# Patient Record
Sex: FEMALE | Race: WHITE | NOT HISPANIC OR LATINO | Employment: OTHER | URBAN - METROPOLITAN AREA
[De-identification: names, ages, dates, MRNs, and addresses within clinical notes are randomized per-mention and may not be internally consistent; named-entity substitution may affect disease eponyms.]

---

## 2018-03-01 ENCOUNTER — OFFICE VISIT CONVERTED (OUTPATIENT)
Dept: SURGERY | Facility: CLINIC | Age: 78
End: 2018-03-01
Attending: NURSE PRACTITIONER

## 2018-05-21 ENCOUNTER — OFFICE VISIT CONVERTED (OUTPATIENT)
Dept: UROLOGY | Facility: CLINIC | Age: 78
End: 2018-05-21
Attending: NURSE PRACTITIONER

## 2018-08-20 ENCOUNTER — PROCEDURE VISIT CONVERTED (OUTPATIENT)
Dept: UROLOGY | Facility: CLINIC | Age: 78
End: 2018-08-20
Attending: UROLOGY

## 2018-08-23 ENCOUNTER — PROCEDURE VISIT CONVERTED (OUTPATIENT)
Dept: UROLOGY | Facility: CLINIC | Age: 78
End: 2018-08-23
Attending: UROLOGY

## 2018-11-08 ENCOUNTER — OFFICE VISIT CONVERTED (OUTPATIENT)
Dept: UROLOGY | Facility: CLINIC | Age: 78
End: 2018-11-08
Attending: UROLOGY

## 2019-01-11 ENCOUNTER — HOSPITAL ENCOUNTER (OUTPATIENT)
Dept: OTHER | Facility: HOSPITAL | Age: 79
Discharge: HOME | End: 2019-01-11
Attending: FAMILY MEDICINE

## 2019-01-11 LAB
25(OH)D3 SERPL-MCNC: 25.3 NG/ML (ref 30–100)
ALBUMIN SERPL-MCNC: 4.2 G/DL (ref 3.5–5)
ALBUMIN/GLOB SERPL: 1.8 {RATIO} (ref 1.4–2.6)
ALP SERPL-CCNC: 71 U/L (ref 43–160)
ALT SERPL-CCNC: 21 U/L (ref 10–40)
ANION GAP SERPL CALC-SCNC: 18 MMOL/L (ref 8–19)
AST SERPL-CCNC: 21 U/L (ref 15–50)
BILIRUB SERPL-MCNC: 0.7 MG/DL (ref 0.2–1.3)
BUN SERPL-MCNC: 11 MG/DL (ref 5–25)
BUN/CREAT SERPL: 13 {RATIO} (ref 6–20)
CALCIUM SERPL-MCNC: 9.3 MG/DL (ref 8.7–10.4)
CHLORIDE SERPL-SCNC: 106 MMOL/L (ref 99–111)
CONV CO2: 25 MMOL/L (ref 22–32)
CONV TOTAL PROTEIN: 6.6 G/DL (ref 6.3–8.2)
CREAT UR-MCNC: 0.84 MG/DL (ref 0.5–0.9)
GFR SERPLBLD BASED ON 1.73 SQ M-ARVRAT: >60 ML/MIN/{1.73_M2}
GLOBULIN UR ELPH-MCNC: 2.4 G/DL (ref 2–3.5)
GLUCOSE SERPL-MCNC: 117 MG/DL (ref 65–99)
OSMOLALITY SERPL CALC.SUM OF ELEC: 300 MOSM/KG (ref 273–304)
POTASSIUM SERPL-SCNC: 4.1 MMOL/L (ref 3.5–5.3)
SODIUM SERPL-SCNC: 145 MMOL/L (ref 135–147)

## 2019-03-26 ENCOUNTER — OFFICE VISIT CONVERTED (OUTPATIENT)
Dept: PULMONOLOGY | Facility: CLINIC | Age: 79
End: 2019-03-26
Attending: INTERNAL MEDICINE

## 2019-04-29 ENCOUNTER — HOSPITAL ENCOUNTER (OUTPATIENT)
Dept: OTHER | Facility: HOSPITAL | Age: 79
Discharge: HOME | End: 2019-04-29
Attending: FAMILY MEDICINE

## 2019-04-29 LAB
25(OH)D3 SERPL-MCNC: 22.7 NG/ML (ref 30–100)
ALBUMIN SERPL-MCNC: 4.3 G/DL (ref 3.5–5)
ALBUMIN/GLOB SERPL: 2 {RATIO} (ref 1.4–2.6)
ALP SERPL-CCNC: 68 U/L (ref 43–160)
ALT SERPL-CCNC: 17 U/L (ref 10–40)
ANION GAP SERPL CALC-SCNC: 15 MMOL/L (ref 8–19)
AST SERPL-CCNC: 19 U/L (ref 15–50)
BILIRUB SERPL-MCNC: 0.77 MG/DL (ref 0.2–1.3)
BUN SERPL-MCNC: 13 MG/DL (ref 5–25)
BUN/CREAT SERPL: 22 {RATIO} (ref 6–20)
CALCIUM SERPL-MCNC: 8.9 MG/DL (ref 8.7–10.4)
CHLORIDE SERPL-SCNC: 106 MMOL/L (ref 99–111)
CONV CO2: 27 MMOL/L (ref 22–32)
CONV TOTAL PROTEIN: 6.5 G/DL (ref 6.3–8.2)
CREAT UR-MCNC: 0.59 MG/DL (ref 0.5–0.9)
EST. AVERAGE GLUCOSE BLD GHB EST-MCNC: 117 MG/DL
GFR SERPLBLD BASED ON 1.73 SQ M-ARVRAT: >60 ML/MIN/{1.73_M2}
GLOBULIN UR ELPH-MCNC: 2.2 G/DL (ref 2–3.5)
GLUCOSE SERPL-MCNC: 105 MG/DL (ref 65–99)
HBA1C MFR BLD: 5.7 % (ref 3.5–5.7)
OSMOLALITY SERPL CALC.SUM OF ELEC: 298 MOSM/KG (ref 273–304)
POTASSIUM SERPL-SCNC: 3.7 MMOL/L (ref 3.5–5.3)
SODIUM SERPL-SCNC: 144 MMOL/L (ref 135–147)

## 2019-06-20 ENCOUNTER — OFFICE VISIT CONVERTED (OUTPATIENT)
Dept: UROLOGY | Facility: CLINIC | Age: 79
End: 2019-06-20
Attending: UROLOGY

## 2019-08-14 ENCOUNTER — HOSPITAL ENCOUNTER (OUTPATIENT)
Dept: OTHER | Facility: HOSPITAL | Age: 79
Discharge: HOME | End: 2019-08-14
Attending: FAMILY MEDICINE

## 2019-08-14 LAB
25(OH)D3 SERPL-MCNC: 23.4 NG/ML (ref 30–100)
ALBUMIN SERPL-MCNC: 4.2 G/DL (ref 3.5–5)
ALBUMIN/GLOB SERPL: 1.9 {RATIO} (ref 1.4–2.6)
ALP SERPL-CCNC: 76 U/L (ref 43–160)
ALT SERPL-CCNC: 16 U/L (ref 10–40)
ANION GAP SERPL CALC-SCNC: 18 MMOL/L (ref 8–19)
AST SERPL-CCNC: 18 U/L (ref 15–50)
BILIRUB SERPL-MCNC: 0.52 MG/DL (ref 0.2–1.3)
BUN SERPL-MCNC: 15 MG/DL (ref 5–25)
BUN/CREAT SERPL: 25 {RATIO} (ref 6–20)
CALCIUM SERPL-MCNC: 9 MG/DL (ref 8.7–10.4)
CHLORIDE SERPL-SCNC: 104 MMOL/L (ref 99–111)
CONV CO2: 23 MMOL/L (ref 22–32)
CONV TOTAL PROTEIN: 6.4 G/DL (ref 6.3–8.2)
CREAT UR-MCNC: 0.6 MG/DL (ref 0.5–0.9)
GFR SERPLBLD BASED ON 1.73 SQ M-ARVRAT: >60 ML/MIN/{1.73_M2}
GLOBULIN UR ELPH-MCNC: 2.2 G/DL (ref 2–3.5)
GLUCOSE SERPL-MCNC: 104 MG/DL (ref 65–99)
OSMOLALITY SERPL CALC.SUM OF ELEC: 293 MOSM/KG (ref 273–304)
POTASSIUM SERPL-SCNC: 4.1 MMOL/L (ref 3.5–5.3)
SODIUM SERPL-SCNC: 141 MMOL/L (ref 135–147)

## 2019-08-15 LAB
T4 FREE SERPL-MCNC: 1.4 NG/DL (ref 0.9–1.8)
TSH SERPL-ACNC: 4.41 M[IU]/L (ref 0.27–4.2)

## 2019-11-13 ENCOUNTER — HOSPITAL ENCOUNTER (OUTPATIENT)
Dept: OTHER | Facility: HOSPITAL | Age: 79
Discharge: HOME | End: 2019-11-13
Attending: FAMILY MEDICINE

## 2019-11-13 LAB
25(OH)D3 SERPL-MCNC: 24.6 NG/ML (ref 30–100)
ALBUMIN SERPL-MCNC: 4 G/DL (ref 3.5–5)
ALBUMIN/GLOB SERPL: 1.4 {RATIO} (ref 1.4–2.6)
ALP SERPL-CCNC: 80 U/L (ref 43–160)
ALT SERPL-CCNC: 16 U/L (ref 10–40)
ANION GAP SERPL CALC-SCNC: 17 MMOL/L (ref 8–19)
AST SERPL-CCNC: 19 U/L (ref 15–50)
BILIRUB SERPL-MCNC: 0.66 MG/DL (ref 0.2–1.3)
BUN SERPL-MCNC: 11 MG/DL (ref 5–25)
BUN/CREAT SERPL: 17 {RATIO} (ref 6–20)
CALCIUM SERPL-MCNC: 9.3 MG/DL (ref 8.7–10.4)
CHLORIDE SERPL-SCNC: 103 MMOL/L (ref 99–111)
CONV CO2: 26 MMOL/L (ref 22–32)
CONV TOTAL PROTEIN: 6.9 G/DL (ref 6.3–8.2)
CREAT UR-MCNC: 0.63 MG/DL (ref 0.5–0.9)
GFR SERPLBLD BASED ON 1.73 SQ M-ARVRAT: >60 ML/MIN/{1.73_M2}
GLOBULIN UR ELPH-MCNC: 2.9 G/DL (ref 2–3.5)
GLUCOSE SERPL-MCNC: 112 MG/DL (ref 65–99)
OSMOLALITY SERPL CALC.SUM OF ELEC: 294 MOSM/KG (ref 273–304)
POTASSIUM SERPL-SCNC: 3.9 MMOL/L (ref 3.5–5.3)
SODIUM SERPL-SCNC: 142 MMOL/L (ref 135–147)
T4 FREE SERPL-MCNC: 1.2 NG/DL (ref 0.9–1.8)
TSH SERPL-ACNC: 6.79 M[IU]/L (ref 0.27–4.2)

## 2019-11-15 ENCOUNTER — HOSPITAL ENCOUNTER (OUTPATIENT)
Dept: MAMMOGRAPHY | Facility: HOSPITAL | Age: 79
Discharge: HOME | End: 2019-11-15
Attending: FAMILY MEDICINE

## 2019-12-04 ENCOUNTER — HOSPITAL ENCOUNTER (OUTPATIENT)
Dept: SURGERY | Facility: HOSPITAL | Age: 79
Setting detail: HOSPITAL OUTPATIENT SURGERY
Discharge: HOME | End: 2019-12-04
Attending: OPHTHALMOLOGY

## 2019-12-04 LAB — GLUCOSE BLD-MCNC: 99 MG/DL (ref 65–99)

## 2019-12-18 ENCOUNTER — HOSPITAL ENCOUNTER (OUTPATIENT)
Dept: SURGERY | Facility: HOSPITAL | Age: 79
Setting detail: HOSPITAL OUTPATIENT SURGERY
Discharge: HOME | End: 2019-12-18
Attending: OPHTHALMOLOGY

## 2019-12-18 LAB — GLUCOSE BLD-MCNC: 106 MG/DL (ref 65–99)

## 2020-01-23 ENCOUNTER — HOSPITAL ENCOUNTER (OUTPATIENT)
Dept: UROLOGY | Facility: CLINIC | Age: 80
Discharge: HOME | End: 2020-01-23
Attending: UROLOGY

## 2020-01-23 ENCOUNTER — OFFICE VISIT CONVERTED (OUTPATIENT)
Dept: UROLOGY | Facility: CLINIC | Age: 80
End: 2020-01-23
Attending: UROLOGY

## 2020-01-25 LAB — BACTERIA UR CULT: NORMAL

## 2020-02-04 ENCOUNTER — HOSPITAL ENCOUNTER (OUTPATIENT)
Dept: OTHER | Facility: HOSPITAL | Age: 80
Discharge: HOME | End: 2020-02-04
Attending: FAMILY MEDICINE

## 2020-02-04 LAB
25(OH)D3 SERPL-MCNC: 29.4 NG/ML (ref 30–100)
EST. AVERAGE GLUCOSE BLD GHB EST-MCNC: 120 MG/DL
HBA1C MFR BLD: 5.8 % (ref 3.5–5.7)
T4 FREE SERPL-MCNC: 1.6 NG/DL (ref 0.9–1.8)
TSH SERPL-ACNC: 1.84 M[IU]/L (ref 0.27–4.2)

## 2020-04-10 ENCOUNTER — OFFICE VISIT CONVERTED (OUTPATIENT)
Dept: PULMONOLOGY | Facility: CLINIC | Age: 80
End: 2020-04-10
Attending: NURSE PRACTITIONER

## 2020-05-06 ENCOUNTER — HOSPITAL ENCOUNTER (OUTPATIENT)
Dept: OTHER | Facility: HOSPITAL | Age: 80
Discharge: HOME | End: 2020-05-06
Attending: FAMILY MEDICINE

## 2020-05-06 LAB
25(OH)D3 SERPL-MCNC: 25.1 NG/ML (ref 30–100)
ALBUMIN SERPL-MCNC: 4.1 G/DL (ref 3.5–5)
ALBUMIN/GLOB SERPL: 1.7 {RATIO} (ref 1.4–2.6)
ALP SERPL-CCNC: 60 U/L (ref 43–160)
ALT SERPL-CCNC: 16 U/L (ref 10–40)
ANION GAP SERPL CALC-SCNC: 18 MMOL/L (ref 8–19)
AST SERPL-CCNC: 20 U/L (ref 15–50)
BILIRUB SERPL-MCNC: 0.84 MG/DL (ref 0.2–1.3)
BUN SERPL-MCNC: 15 MG/DL (ref 5–25)
BUN/CREAT SERPL: 26 {RATIO} (ref 6–20)
CALCIUM SERPL-MCNC: 9.1 MG/DL (ref 8.7–10.4)
CHLORIDE SERPL-SCNC: 104 MMOL/L (ref 99–111)
CONV CO2: 26 MMOL/L (ref 22–32)
CONV TOTAL PROTEIN: 6.5 G/DL (ref 6.3–8.2)
CREAT UR-MCNC: 0.57 MG/DL (ref 0.5–0.9)
GFR SERPLBLD BASED ON 1.73 SQ M-ARVRAT: >60 ML/MIN/{1.73_M2}
GLOBULIN UR ELPH-MCNC: 2.4 G/DL (ref 2–3.5)
GLUCOSE SERPL-MCNC: 105 MG/DL (ref 65–99)
OSMOLALITY SERPL CALC.SUM OF ELEC: 297 MOSM/KG (ref 273–304)
POTASSIUM SERPL-SCNC: 4.5 MMOL/L (ref 3.5–5.3)
SODIUM SERPL-SCNC: 143 MMOL/L (ref 135–147)

## 2020-08-05 ENCOUNTER — HOSPITAL ENCOUNTER (OUTPATIENT)
Dept: OTHER | Facility: HOSPITAL | Age: 80
Discharge: HOME | End: 2020-08-05
Attending: FAMILY MEDICINE

## 2020-08-05 LAB — 25(OH)D3 SERPL-MCNC: 24.6 NG/ML (ref 30–100)

## 2020-10-07 ENCOUNTER — OFFICE VISIT CONVERTED (OUTPATIENT)
Dept: UROLOGY | Facility: CLINIC | Age: 80
End: 2020-10-07
Attending: UROLOGY

## 2020-11-10 ENCOUNTER — HOSPITAL ENCOUNTER (OUTPATIENT)
Dept: OTHER | Facility: HOSPITAL | Age: 80
Discharge: HOME | End: 2020-11-10
Attending: FAMILY MEDICINE

## 2020-11-10 LAB
25(OH)D3 SERPL-MCNC: 25.1 NG/ML (ref 30–100)
ALBUMIN SERPL-MCNC: 4 G/DL (ref 3.5–5)
ALBUMIN/GLOB SERPL: 1.7 {RATIO} (ref 1.4–2.6)
ALP SERPL-CCNC: 63 U/L (ref 43–160)
ALT SERPL-CCNC: 19 U/L (ref 10–40)
ANION GAP SERPL CALC-SCNC: 13 MMOL/L (ref 8–19)
AST SERPL-CCNC: 19 U/L (ref 15–50)
BILIRUB SERPL-MCNC: 0.95 MG/DL (ref 0.2–1.3)
BUN SERPL-MCNC: 13 MG/DL (ref 5–25)
BUN/CREAT SERPL: 20 {RATIO} (ref 6–20)
CALCIUM SERPL-MCNC: 9.6 MG/DL (ref 8.7–10.4)
CHLORIDE SERPL-SCNC: 106 MMOL/L (ref 99–111)
CONV CO2: 27 MMOL/L (ref 22–32)
CONV TOTAL PROTEIN: 6.4 G/DL (ref 6.3–8.2)
CREAT UR-MCNC: 0.65 MG/DL (ref 0.5–0.9)
GFR SERPLBLD BASED ON 1.73 SQ M-ARVRAT: >60 ML/MIN/{1.73_M2}
GLOBULIN UR ELPH-MCNC: 2.4 G/DL (ref 2–3.5)
GLUCOSE SERPL-MCNC: 112 MG/DL (ref 65–99)
OSMOLALITY SERPL CALC.SUM OF ELEC: 295 MOSM/KG (ref 273–304)
POTASSIUM SERPL-SCNC: 4.2 MMOL/L (ref 3.5–5.3)
SODIUM SERPL-SCNC: 142 MMOL/L (ref 135–147)

## 2021-01-13 ENCOUNTER — HOSPITAL ENCOUNTER (OUTPATIENT)
Dept: OTHER | Facility: HOSPITAL | Age: 81
Discharge: HOME | End: 2021-01-13
Attending: INTERNAL MEDICINE

## 2021-01-27 ENCOUNTER — OFFICE VISIT CONVERTED (OUTPATIENT)
Dept: PULMONOLOGY | Facility: CLINIC | Age: 81
End: 2021-01-27
Attending: NURSE PRACTITIONER

## 2021-01-28 ENCOUNTER — HOSPITAL ENCOUNTER (OUTPATIENT)
Dept: GENERAL RADIOLOGY | Facility: HOSPITAL | Age: 81
Discharge: HOME | End: 2021-01-28
Attending: NURSE PRACTITIONER

## 2021-02-09 ENCOUNTER — HOSPITAL ENCOUNTER (OUTPATIENT)
Dept: VACCINE CLINIC | Facility: HOSPITAL | Age: 81
Discharge: HOME | End: 2021-02-09
Attending: INTERNAL MEDICINE

## 2021-02-24 ENCOUNTER — HOSPITAL ENCOUNTER (OUTPATIENT)
Dept: OTHER | Facility: HOSPITAL | Age: 81
Discharge: HOME | End: 2021-02-24
Attending: NURSE PRACTITIONER

## 2021-02-24 LAB
25(OH)D3 SERPL-MCNC: 25.8 NG/ML (ref 30–100)
ALBUMIN SERPL-MCNC: 4.1 G/DL (ref 3.5–5)
ALBUMIN/GLOB SERPL: 1.8 {RATIO} (ref 1.4–2.6)
ALP SERPL-CCNC: 71 U/L (ref 43–160)
ALT SERPL-CCNC: 20 U/L (ref 10–40)
ANION GAP SERPL CALC-SCNC: 12 MMOL/L (ref 8–19)
AST SERPL-CCNC: 21 U/L (ref 15–50)
BASOPHILS # BLD AUTO: 0.02 10*3/UL (ref 0–0.2)
BASOPHILS NFR BLD AUTO: 0.4 % (ref 0–3)
BILIRUB SERPL-MCNC: 0.65 MG/DL (ref 0.2–1.3)
BUN SERPL-MCNC: 13 MG/DL (ref 5–25)
BUN/CREAT SERPL: 21 {RATIO} (ref 6–20)
CALCIUM SERPL-MCNC: 9.1 MG/DL (ref 8.7–10.4)
CHLORIDE SERPL-SCNC: 103 MMOL/L (ref 99–111)
CHOLEST SERPL-MCNC: 134 MG/DL (ref 107–200)
CHOLEST/HDLC SERPL: 2.9 {RATIO} (ref 3–6)
CONV ABS IMM GRAN: 0.02 10*3/UL (ref 0–0.2)
CONV CO2: 27 MMOL/L (ref 22–32)
CONV IMMATURE GRAN: 0.4 % (ref 0–1.8)
CONV TOTAL PROTEIN: 6.4 G/DL (ref 6.3–8.2)
CREAT UR-MCNC: 0.62 MG/DL (ref 0.5–0.9)
DEPRECATED RDW RBC AUTO: 44.4 FL (ref 36.4–46.3)
EOSINOPHIL # BLD AUTO: 0.15 10*3/UL (ref 0–0.7)
EOSINOPHIL # BLD AUTO: 3 % (ref 0–7)
ERYTHROCYTE [DISTWIDTH] IN BLOOD BY AUTOMATED COUNT: 13.3 % (ref 11.7–14.4)
EST. AVERAGE GLUCOSE BLD GHB EST-MCNC: 123 MG/DL
GFR SERPLBLD BASED ON 1.73 SQ M-ARVRAT: >60 ML/MIN/{1.73_M2}
GLOBULIN UR ELPH-MCNC: 2.3 G/DL (ref 2–3.5)
GLUCOSE SERPL-MCNC: 111 MG/DL (ref 65–99)
HBA1C MFR BLD: 5.9 % (ref 3.5–5.7)
HCT VFR BLD AUTO: 42.1 % (ref 37–47)
HDLC SERPL-MCNC: 47 MG/DL (ref 40–60)
HGB BLD-MCNC: 14.1 G/DL (ref 12–16)
LDLC SERPL CALC-MCNC: 73 MG/DL (ref 70–100)
LYMPHOCYTES # BLD AUTO: 1.5 10*3/UL (ref 1–5)
LYMPHOCYTES NFR BLD AUTO: 30.2 % (ref 20–45)
MCH RBC QN AUTO: 30.5 PG (ref 27–31)
MCHC RBC AUTO-ENTMCNC: 33.5 G/DL (ref 33–37)
MCV RBC AUTO: 91.1 FL (ref 81–99)
MONOCYTES # BLD AUTO: 0.44 10*3/UL (ref 0.2–1.2)
MONOCYTES NFR BLD AUTO: 8.9 % (ref 3–10)
NEUTROPHILS # BLD AUTO: 2.83 10*3/UL (ref 2–8)
NEUTROPHILS NFR BLD AUTO: 57.1 % (ref 30–85)
NRBC CBCN: 0 % (ref 0–0.7)
OSMOLALITY SERPL CALC.SUM OF ELEC: 287 MOSM/KG (ref 273–304)
PLATELET # BLD AUTO: 219 10*3/UL (ref 130–400)
PMV BLD AUTO: 10.3 FL (ref 9.4–12.3)
POTASSIUM SERPL-SCNC: 3.8 MMOL/L (ref 3.5–5.3)
RBC # BLD AUTO: 4.62 10*6/UL (ref 4.2–5.4)
SODIUM SERPL-SCNC: 138 MMOL/L (ref 135–147)
T4 FREE SERPL-MCNC: 1.7 NG/DL (ref 0.9–1.8)
TRIGL SERPL-MCNC: 69 MG/DL (ref 40–150)
TSH SERPL-ACNC: 3.4 M[IU]/L (ref 0.27–4.2)
VLDLC SERPL-MCNC: 14 MG/DL (ref 5–37)
WBC # BLD AUTO: 4.96 10*3/UL (ref 4.8–10.8)

## 2021-02-26 LAB — IGE SERPL-ACNC: 5 K[IU]/ML (ref 0–24)

## 2021-03-30 ENCOUNTER — OFFICE VISIT CONVERTED (OUTPATIENT)
Dept: PULMONOLOGY | Facility: CLINIC | Age: 81
End: 2021-03-30
Attending: INTERNAL MEDICINE

## 2021-05-11 ENCOUNTER — HOSPITAL ENCOUNTER (OUTPATIENT)
Dept: OTHER | Facility: HOSPITAL | Age: 81
Discharge: HOME | End: 2021-05-11
Attending: FAMILY MEDICINE

## 2021-05-11 LAB
25(OH)D3 SERPL-MCNC: 24.5 NG/ML (ref 30–100)
ALBUMIN SERPL-MCNC: 4.1 G/DL (ref 3.5–5)
ALBUMIN/GLOB SERPL: 1.5 {RATIO} (ref 1.4–2.6)
ALP SERPL-CCNC: 64 U/L (ref 43–160)
ALT SERPL-CCNC: 17 U/L (ref 10–40)
ANION GAP SERPL CALC-SCNC: 14 MMOL/L (ref 8–19)
AST SERPL-CCNC: 21 U/L (ref 15–50)
BILIRUB SERPL-MCNC: 0.97 MG/DL (ref 0.2–1.3)
BUN SERPL-MCNC: 11 MG/DL (ref 5–25)
BUN/CREAT SERPL: 17 {RATIO} (ref 6–20)
CALCIUM SERPL-MCNC: 9 MG/DL (ref 8.7–10.4)
CHLORIDE SERPL-SCNC: 105 MMOL/L (ref 99–111)
CONV CO2: 27 MMOL/L (ref 22–32)
CONV TOTAL PROTEIN: 6.8 G/DL (ref 6.3–8.2)
CREAT UR-MCNC: 0.64 MG/DL (ref 0.5–0.9)
GFR SERPLBLD BASED ON 1.73 SQ M-ARVRAT: >60 ML/MIN/{1.73_M2}
GLOBULIN UR ELPH-MCNC: 2.7 G/DL (ref 2–3.5)
GLUCOSE SERPL-MCNC: 102 MG/DL (ref 65–99)
OSMOLALITY SERPL CALC.SUM OF ELEC: 294 MOSM/KG (ref 273–304)
POTASSIUM SERPL-SCNC: 3.8 MMOL/L (ref 3.5–5.3)
SODIUM SERPL-SCNC: 142 MMOL/L (ref 135–147)

## 2021-05-13 NOTE — PROGRESS NOTES
Progress Note      Patient Name: Cinthia Díaz   Patient ID: [id number]   Sex: Female   YOB: 1940    Primary Care Provider: Yasmin Heck MD   Referring Provider: Sarah JUÁREZ    Visit Date: October 7, 2020    Provider: Sidney Rocha MD   Location: [facility]   Location Address: [address] [place] [address], [address] [place] [address]  [id number]   Location Phone: [phone]          Chief Complaint  · follow up      History Of Present Illness  The patient is a 80 year old /White female , who is a follow up on urge incontinence.        1/23/20 urine culture.Patient has no urgency in the day time.Nocturia 2-3 times which affects her sleep.No dysuria.No gross hematuria.Discussed diuretics with the patient but she does'nt like the idea       Past Medical History  Allergic rhinitis; Allergic rhinitis, chronic; Arthritis; Bladder Disorder; Chondromalacia of patella; Chronic Obstructive Pulmonary Disease; Colon polyps; Cystourethrocele; Degenertive Joint Disease; NOC; Diabetes; High blood pressure; Hyperlipidemia; Hypertension; Intrinsic sphincter deficiency; Knee pain; Limb Pain; Limb Swelling; Lung Nodule; Osteopenia; Transient ischemic attack (TIA); Urgency incontinence         Past Surgical History  Arthroscopic Knee Surgery; Bladder Repair; Cataract exctraction with lens implant; Cholecystectomy; Colonoscopy; Gallbladder; Hernia; Hernia Repair; Hysterectomy-Abdominal         Medication List  aspirin 81 mg oral tablet,delayed release (DR/EC); atorvastatin 20 mg oral tablet; azelastine 137 mcg (0.1 %) nasal aerosol,spray; Ditropan XL 5 mg oral tablet extended release 24hr; doxazosin 2 mg oral tablet; Janumet  mg oral tablet; levothyroxine 75 mcg oral tablet; metformin 500 mg oral tablet; metoprolol succinate 25 mg oral tablet extended release 24 hr; Singulair 10 mg oral tablet; Vit D 50 ,000         Allergy List  ACE INHIBITORS; Codiene; Micardis; Molds; morphine; Pollens  "(trees, grasses, flowers)       Allergies Reconciled  Family Medical History  Emphysema; Stroke; Diabetes, unspecified type; Family history of colon cancer         Social History  Alcohol (Light); Caffeine (Current some day); Second hand smoke exposure (Never); Tobacco (Never)         Review of Systems  · Constitutional  o Denies  o : fever, headache, chills  · Eyes  o Denies  o : eye pain, double vision, blurred vision  · HENT  o Denies  o : sinus problems, sore throat, ear infection  · Cardiovascular  o Denies  o : chest pain, high blood pressure, varicosities  · Respiratory  o Denies  o : shortness of breath, wheezing, frequent cough  · Gastrointestinal  o Admits  o : excessive flatulence  o Denies  o : nausea, vomiting, heartburn, indigestion, abdominal pain  · Genitourinary  o Admits  o : frequency, nocturia, incontinence  o Denies  o : urgency, urinary retention, painful urination  · Integument  o Denies  o : rash, itching, boils  · Neurologic  o Denies  o : tingling or numbness, tremors, dizzy spells  · Musculoskeletal  o Denies  o : joint pain, neck pain, back pain  · Endocrine  o Denies  o : cold intolerance, heat intolerance, tired, excessive thirst, sluggish  · Psychiatric  o Admits  o : feels satisfied with life  o Denies  o : severe depression, concerns with hurting themselves  · Heme-Lymph  o Denies  o : swollen glands, blood clotting problems  · Allergic-Immunologic  o Denies  o : sinus allergy symptoms, hay fever      Vitals  Date Time BP Position Site L\R Cuff Size HR RR TEMP (F) WT  HT  BMI kg/m2 BSA m2 O2 Sat FR L/min FiO2        10/07/2020 01:24 /48 Sitting    87 - R  97.9 170lbs 0oz 5'  3\" 30.11 1.85             Physical Examination  · Constitutional  o Appearance  o : Well nourished, well developed patient in no acute distress. Ambulating without difficulty.  · Neck  o Thyroid  o : gland size normal, nontender, no nodules or masses present on palpation  · Respiratory  o Respiratory " Effort  o : Breathing is unlabored without accessory muscle use  o Inspection of Chest  o : normal appearance, no retractions  o Auscultation of Lungs  o : normal breath sounds throughout  · Cardiovascular  o Heart  o :   § Auscultation of Heart  § : regular rate and rhythm, no murmurs, gallops or rubs  o Peripheral Vascular System  o : No abnormalities  · Gastrointestinal  o Abdominal Examination  o : Scaphoid abdomen which is non-tender to palpation with normal tone and without rigidity or guarding. Normal bowel sounds. No masses present.  o Liver and spleen  o : No hepatomegaly present. Liver is non-tender to palpation and spleen is not palpable.  o Hernias  o : no hernias present  · Skin and Subcutaneous Tissue  o General Inspection  o : No rashes, lesions or areas of discoloration present. Skin turgor is normal.  · Neurologic  o Mental Status Examination  o : grossly oriented to person, place and time  o Gait and Station  o : normal gait, able to stand without difficulty  · Psychiatric  o Mood and Affect  o : mood normal, affect appropriate     No pitting edema of lower extremities       Figure 1.0: Pain Rating Scale-[place]         Results  · In-Office Procedures  o Lab procedure  § Automated dipstick urinalysis with microscopy (90091)   § Color Ur: Yellow   § Clarity Ur: Clear   § Glucose Ur Ql Strip: Negative   § Bilirub Ur Ql Strip: Negative   § Ketones Ur Ql Strip: Negative   § Sp Gr Ur Qn: 1.010   § Hgb Ur Ql Strip: Trace-Intact   § pH Ur-LsCnc: 5.5   § Prot Ur Ql Strip: Negative   § Urobilinogen Ur Strip-mCnc: 0.2 E.U./dL   § Nitrite Ur Ql Strip: Negative   § WBC Est Ur Ql Strip: Negative   § RBC UrnS Qn HPF: 0   § WBC UrnS Qn HPF: 0   § Bacteria UrnS Qn HPF: 0   § Crystals UrnS Qn HPF: 0   § Epithelial Cells (non renal): 0 /HPF  § Epithelial Cells (renal): 0       Assessment  · Urgency incontinence     788.31/N39.41  improved with medication  · Diabetes     250.92    Problems  Reconciled  Plan  · Medications  o Medications have been Reconciled  o Transition of Care or Provider Policy  · Instructions  o Will continue DitropanXL 5 mgm byron and see in 6 mnths            Electronically Signed by: Sidney Rocha MD -Author on October 7, 2020 02:03:59 PM

## 2021-05-14 VITALS
HEIGHT: 63 IN | TEMPERATURE: 97.9 F | HEART RATE: 87 BPM | WEIGHT: 170 LBS | BODY MASS INDEX: 30.12 KG/M2 | DIASTOLIC BLOOD PRESSURE: 48 MMHG | SYSTOLIC BLOOD PRESSURE: 127 MMHG

## 2021-05-15 VITALS
HEART RATE: 81 BPM | WEIGHT: 170 LBS | DIASTOLIC BLOOD PRESSURE: 73 MMHG | BODY MASS INDEX: 30.12 KG/M2 | HEIGHT: 63 IN | SYSTOLIC BLOOD PRESSURE: 143 MMHG | TEMPERATURE: 98.5 F

## 2021-05-15 VITALS
HEIGHT: 63 IN | DIASTOLIC BLOOD PRESSURE: 70 MMHG | HEART RATE: 93 BPM | WEIGHT: 170 LBS | BODY MASS INDEX: 30.12 KG/M2 | SYSTOLIC BLOOD PRESSURE: 122 MMHG | TEMPERATURE: 98.7 F

## 2021-05-16 VITALS
HEIGHT: 63 IN | DIASTOLIC BLOOD PRESSURE: 87 MMHG | SYSTOLIC BLOOD PRESSURE: 144 MMHG | WEIGHT: 172 LBS | HEART RATE: 107 BPM | TEMPERATURE: 98.1 F | BODY MASS INDEX: 30.48 KG/M2

## 2021-05-16 VITALS — HEIGHT: 63 IN | BODY MASS INDEX: 30.12 KG/M2 | WEIGHT: 170 LBS | RESPIRATION RATE: 16 BRPM

## 2021-05-16 VITALS
DIASTOLIC BLOOD PRESSURE: 81 MMHG | HEART RATE: 83 BPM | BODY MASS INDEX: 30.12 KG/M2 | HEIGHT: 63 IN | SYSTOLIC BLOOD PRESSURE: 138 MMHG | WEIGHT: 170 LBS | TEMPERATURE: 98.4 F

## 2021-05-28 VITALS
TEMPERATURE: 98.3 F | SYSTOLIC BLOOD PRESSURE: 119 MMHG | WEIGHT: 168 LBS | RESPIRATION RATE: 16 BRPM | BODY MASS INDEX: 28.68 KG/M2 | DIASTOLIC BLOOD PRESSURE: 61 MMHG | OXYGEN SATURATION: 96 % | HEIGHT: 64 IN | HEART RATE: 91 BPM

## 2021-05-28 VITALS
WEIGHT: 168 LBS | BODY MASS INDEX: 28.68 KG/M2 | OXYGEN SATURATION: 96 % | SYSTOLIC BLOOD PRESSURE: 140 MMHG | RESPIRATION RATE: 16 BRPM | HEART RATE: 113 BPM | TEMPERATURE: 98.4 F | HEIGHT: 64 IN | DIASTOLIC BLOOD PRESSURE: 68 MMHG

## 2021-05-28 VITALS
HEART RATE: 86 BPM | HEIGHT: 64 IN | OXYGEN SATURATION: 96 % | RESPIRATION RATE: 15 BRPM | DIASTOLIC BLOOD PRESSURE: 54 MMHG | WEIGHT: 174 LBS | BODY MASS INDEX: 29.71 KG/M2 | SYSTOLIC BLOOD PRESSURE: 130 MMHG | TEMPERATURE: 96.5 F

## 2021-05-28 NOTE — PROGRESS NOTES
Patient: ERWIN VALADEZ     Acct: [id number]     Report: #[id number]  UNIT #: [id number]     : 1940    Encounter Date:2021  PRIMARY CARE: JESS COBB  ***Signed***  --------------------------------------------------------------------------------------------------------------------  Chief Complaint      Encounter Date      Mar 30, 2021            Primary Care Provider      JESS COBB            Referring Provider      JESS COBB            Patient Complaint      Patient is complaining of      Patient is here today for a 2 month follow up            VITALS      Height 5 ft 4.00 in / 162.56 cm      Weight 168 lbs  / 76.434231 kg      BSA 1.82 m2      BMI 28.8 kg/m2      Temperature 98.3 F / 36.83 C - Tympanic      Pulse 91      Respirations 16      Blood Pressure 119/61 Sitting, Left Arm      Pulse Oximetry 96%, room air      Initial Exhaled Nitrous Oxide      Date:  Mar 26, 2019            HPI      The patient is a 80 year old female with recurrent bronchitis and pneumonia, 5     mm left lower lobe lung nodule, mediastinal lymphadenopathy, chronic allergies,     hiatal hernia and posterior pharyngeal wall erythema. She is here for follow up     today.             Since her last office visit she had a dry cough for several weeks and had a     chest x-ray done which was normal. She continues to have dry cough with no     diurnal variation. She does not produce any phlegm and does not have any     worsening cough when she lies flat. She did take Prevacid and it helped a little    but but otherwise she has been the same. She has no fever or chills, no nausea     or vomiting. She tried Mucinex D M but it made her dizzy. She has some small     swelling in her left neck which has been there for the past 2 months or so. She     has no changes in weight or appetite, no nausea or vomiting.            ROS      Constitutional:  Denies: Fatigue, Fever, Weight gain, Weight loss, Chills,      Insomnia, Other      Respiratory/Breathing:  Complains of: Cough (dry cough); Denies: Shortness of     air, Wheezing, Hemoptysis, Pleuritic pain, Other      Endocrine:  Denies: Polydipsia, Polyuria, Heat/cold intolerance, Abnorml     menstrual pattern, Diabetes, Other      Eyes:  Denies: Blurred vision, Vision Changes, Other      Ears, nose, mouth, throat:  Denies: Mouth lesions, Thrush, Throat pain,     Hoarseness, Allergies/Hay Fever, Post Nasal Drip, Headaches, Recent Head Injury,    Nose Bleeding, Neck Stiffness, Thyroid Mass, Hearing Loss, Ear Fullness, Dry     Mouth, Nasal or Sinus Pain, Dry Lips, Nasal discharge, Nasal congestion, Other      Cardiovascular:  Denies: Palpitations, Syncope, Claudication, Chest Pain, Wake     up Gasping for air, Leg Swelling, Irregular Heart Rate, Cyanosis, Dyspnea on     Exertion, Other      Gastrointestinal:  Denies: Nausea, Constipation, Diarrhea, Abdominal pain, Vomi    ting, Difficulty Swallowing, Reflux/Heartburn, Dysphagia, Jaundice, Bloating,     Melena, Bloody stools, Other      Genitourinary:  Denies: Urinary frequency, Incontinence, Hematuria, Urgency,     Nocturia, Dysuria, Testicular problems, Other      Musculoskeletal:  Denies: Joint Pain, Joint Stiffness, Joint Swelling, Myalgias,    Other      Hematologic/lymphatic:  DENIES: Lymphadenopathy, Bruising, Bleeding tendencies,     Other      Neurological:  Denies: Headache, Numbness, Weakness, Seizures, Other      Psychiatric:  Denies: Anxiety, Appropriate Effect, Depression, Other      Sleep:  No: Excessive daytime sleep, Morning Headache?, Snoring, Insomnia?, Stop    breathing at sleep?, Other      Integumentary:  Denies: Rash, Dry skin, Skin Warm to Touch, Other      Immunologic/Allergic:  Denies: Latex allergy, Seasonal allergies, Asthma,     Urticaria, Eczema, Other      Immunization status:  No: Up to date            FAMILY/SOCIAL/MEDICAL HX      Surgical History:  Yes: Abdominal Surgery (ABD HERNIA REPAIR  11/2010), Bladder     Surgery (BLADDER SLING 2018), Bowel Surgery (COLONOSCOPY), Cholecystectomy     (LAPAROSCOPIC 1994), Orthopedic Surgery (RIGHT KNEE SCOPE), Other Surgeries     (GALL BLADDER YRS AGO); No: AAA Repair, Adenoids, Angioplasty, Appendectomy, Ba    ck Surgery, Breast Surgery, CABG, Carotid Stenosis, Ear Surgery, Eye Surgery,     Head Surgery, Hernia Surgery, Kidney Surgery, Nose Surgery, Oral Surgery,     Prostatectomy, Rectal Surgery, Spinal Surgery, Testicular Surgery, Throat     Surgery, Tonsils, Valve Replacement, Vascular Surgery      Stroke - Family Hx:  Mother      Diabetes - Family Hx:  Mother      Cancer/Type - Family Hx:  Father      Other Family Medical History:  Brother      Is Father Still Living?:  No      Is Mother Still Living?:  No      Social History:  No Tobacco Use, No Alcohol Use, No Recreational Drug use      Smoking status:  Never smoker      Anticoagulation Therapy:  No      Antibiotic Prophylaxis:  No      Medical History:  Yes: Arthritis (KNEES), Diabetes (TYPE II,  AM -120),     Hemorrhoids/Rectal Prob (H/O COLON POLYPS, REFLUX), High Blood Pressure (ON MED     ); No: Anemia, Asthma, Blood Disease, Broken Bones, Cataracts, Chemical     Dependency, Chemotherapy/Cancer, Chronic Bronchitis/COPD, Emphysema, Chronic     Liver Disease, Colon Trouble, Colitis, Diverticulitis, Congestive Heart Failu,     Deafness or Ringing Ears, Depression, Anxiety, Bipolar Disorder, PTSD, Epilepsy,    Seizures, Glaucoma, Gall Stones, Gout, Head Injury, Heart Attack, Heart Murmur,     GERD, Hepatitis, Hiatal Hernia, HIV (Do not ask - volu, Kidney or Bladder     Disease, Kidney Stones, Migrane Headaches, Mitral Valve Prolapse, Psychiatric     Care, Reflux Disease, Rheumatic Fever, Sexually Transmitted Dis, Shortness Of     Breath, Sinus Trouble, Skin Disease/Psoriais/Ecz, Stroke, Thyroid Problem,     Tuberculosis or Pos TB Te, Miscellaneous Medical/oth      Psychiatric History      none             PREVENTION      Hx Influenza Vaccination:  Yes      Date Influenza Vaccine Given:  Nov 1, 2020      Influenza Vaccine Declined:  No      2 or More Falls in Past Year?:  No      Fall Past Year with Injury?:  No      Hx Pneumococcal Vaccination:  Yes      Encouraged to follow-up with:  PCP regarding preventative exams.      Chart initiated by      Yasmine Blackman CMA            ALLERGIES/MEDICATIONS      Allergies:        Coded Allergies:             CODEINE (Verified  Adverse Reaction, Unknown, VOMITING, 3/30/21)           MORPHINE (Verified  Adverse Reaction, Unknown, VOMITING, 3/30/21)      Medications    Last Reconciled on 3/30/21 17:00 by SUDARSHAN VILLASEÑOR MD      Pantoprazole (Protonix) 40 Mg Tablet.dr      40 MG PO BIDAC, #60 TAB 1 Refill         Prov: Sudarshan Villaseñor         3/30/21       Carbamide Peroxide (Debrox Otic) 15 Ml Drops      15 DROPS EAR LT BID, #15 ML 1 Refill         Prov: Sudarshan Villaseñor         3/30/21       predniSONE (predniSONE) 20 Mg Tablet      40 MG PO QDAY, #10 TAB 0 Refills         Prov: Sudarshan Villaseñor         3/30/21       Amoxicillin/Clavulanate K (Augmentin 875/125 Mg) 1 Each Tablet      875 MG PO BID, #14 TAB 0 Refills         Prov: Sudarshan Villaseñor         3/30/21       Azelastine Hcl (Azelastine Nasal) 137 Mcg/0.137 Ml Spray.pump      1 PUFFS NARE EACH BID, #30 ML 5 Refills         Prov: JOANIE ENGEL PCCS         1/27/21       Oxybutynin Chloride (Oxybutynin Chloride) 5 Mg Tablet      5 MG PO QDAY, TAB         Reported         1/27/21       Mario-Fluticasone (Fluticasone 50 mcg) 16 Gm Spray.susp      1 PUFFS NARE EACH BID, #1 BOTTLE 4 Refills         Prov: JOANIE ENGEL PCCS         4/10/20       Aspirin Chew (Aspirin Baby) 81 Mg Tab.chew      81 MG PO QDAY, #30 TAB.CHEW 0 Refills         Reported         12/2/19       Levothyroxine Sodium (Levoxyl*) 0.075 Mg Tablet      0.075 MG PO QDAY@07, #30 TAB 0 Refills         Reported         12/2/19       Metoprolol Succinate (Metoprolol Succinate) 50  Mg Tab.er.24h      25 MG PO QDAY, #15 TAB.SR.24H 0 Refills         Reported         9/19/18       Ergocalciferol (Vitamin D2) 50,000 Unit Capsule      68680 UNITS PO FR@09, #4 CAP 0 Refills         Reported         4/2/18       Doxazosin Mesylate (Doxazosin Mesylate) 4 Mg Tablet      4 MG PO HS, TAB         Reported         4/2/18       metFORMIN HCl (metFORMIN HCl) 500 Mg Tablet      500 MG PO BID, #60 TAB 0 Refills         Reported         7/13/15       Loratadine (Loratadine) 10 Mg Tablet      10 MG PO BID PRN for ALLERGIES         Reported         2/7/13       metFORMIN HCl/SITagliptin Phos  (Janumet  MG) 1 Tab Tablet      1 TAB PO BID         Reported         4/29/11       Atorvastatin Calcium (Lipitor*) 20 Mg Tablet      20 MG PO QHS         Reported         1/22/09      Current Medications      Current Medications Reviewed 3/30/21            EXAM      CONSTITUTIONAL: Pleasant female in no acute distress normal conversant.       EYES : Pink conjunctive, no ptosis, PERRL.       ENMT :Mallampati classification II, no sinus tenderness.   Nose and ears appear     normal, normal dentition, posterior pharyngeal wall erythema. There is left     mandibular lymphadenopathy, there is right external auditory meatus filled with     wax and she has slight inflammation on the ear drum.        Neck: Nontender, no masses, no thyromegaly, no nodules.      Resp : Normal resp effort, B/l vesicular breath sounds, resonant to percussion     bilaterally, no wheezing, crackles or rhonchi.      CVS  : No carotid bruits, s1s2 nl, RRR, no murmur, rubs or gallop, no peripheral    edema       Chest wall: Normal rise with inspiration, nontender on palpation      GI   : Abdomen soft, with no masses, no hepatosplenomegaly, no hernias, BS+      MSK  : Normal gait and station, no digital cyanosis or clubbing       Skin : No rashes, ulcerations or lesions, normal turgor and temperature      Neuro: CN II - XII intact, no sensory  deficits, DTRs intact and symmetrical, no     motor weakness      Psych: Appropriate affect, A   Vtials      Vitals:             Height 5 ft 4.00 in / 162.56 cm           Weight 168 lbs  / 76.088676 kg           BSA 1.82 m2           BMI 28.8 kg/m2           Temperature 98.3 F / 36.83 C - Tympanic           Pulse 91           Respirations 16           Blood Pressure 119/61 Sitting, Left Arm           Pulse Oximetry 96%, room air            REVIEW      Results Reviewed      PCCS Results Reviewed?:  Yes Prev Lab Results, Yes Prev Radiology Results, Yes     Previous Mecial Records      Radiographic Results               [facility] Diagnostic Img                PACS RADIOLOGY REPORT            Patient: ERWIN VALADEZ   Acct: #[id number]   Report: #[id number]            UNIT #: [id number]    DOS: 21 1540      INSURANCE:MEDICARE PART A   LOCATION:[facility]     : 1940            PROVIDERS      ADMITTING:     ATTENDING: JOANIE ENGEL PCCS      FAMILY:  NONE,MD   ORDERING:  JOANIE ENGEL PCCS         OTHER:    DICTATING:  GABRIEL DAVENPORT MD            REQ #:[id number]   EXAM:CXR2 - CHEST 2V AP PA LAT      REASON FOR EXAM:        REASON FOR VISIT:  COUGH            *******Signed******         PROCEDURE:   CHEST AP/PA AND LATERAL             COMPARISON:   [facility], CR, CHEST AP/PA 1 VIEW, 2016,     22:55.  [facility], CR, CHEST PA/AP          INDICATIONS:   PT STATES SRY COUGH X 3-4 WKS, SEASONAL ALLERGIES             FINDINGS:         Heart size is within normal limits.  No dense consolidation, pleural fluid or     pneumothorax.  Mild       interstitial prominence in both lungs is similar to previous studies and may     represent chronic       parenchymal change.             CONCLUSION:   No active process              GABRIEL DAVENPORT MD             Electronically Signed and Approved By: GABRIEL DAVENPORT MD on 2021 at 16:00                                Until signed, this is an unconfirmed preliminary report that may contain      errors and is subject to change.                                              DOWER:      D:01/28/21 1600      PFT Results      [id number]  [id number]                                       [facility]                          [employer]                            [place]  [id number]               __________________________________________________________________________             Patient Name:                   Attending Physician:      Cinthia Díaz M.D.             Patient Visit # MR #            Admit Date  Disch Date     Location      [id number]    [id number]      05/16/2016                 [facility]- -             Date of Birth      1940      __________________________________________________________________________      821 - DIAGNOSTIC REPORT             PULMONARY FUNCTION TEST             Date of service:  05/16/2016             SPIROMETRY:      Spirometry is normal.      FEV1/FVC 77.      FEV1 is 1.97 L, 97% of predicted.      FVC is 2.57 L, 95% of predicted.      Bronchodilator response:  There is no significant response to bronchodilator      administration.             LUNG VOLUMES:      Lung volumes show air trapping.      Total lung capacity is 4.68 L, 95% of predicted.      Residual volume is 2.09 L, 93% of predicted.             DIFFUSION:      Diffusion capacity is normal.             FLOW VOLUME LOOP:      Flow volume loop is normal.             CONCLUSION:      Normal spirometry with normal lung volumes and normal diffuse capacity.      Normal study.  Please correlate clinically.             To be electronically signed in [employer]      [id number] SUDARSHAN MASSEY M.D.             NK:[id number]      D:  05/23/2016 08:35      T:  05/23/2016 11:14      #[id number]             Until signed, this is an unconfirmed  preliminary report that may contain      errors and is subject to change.                   06/02/16 1549  <Electronically signed by Arnulfo Villaseñor MD>            Assessment      Notes      New Medications      * AMOXICILLIN/CLAVULANATE K (Augmentin 875/125 Mg) 1 EACH TABLET: 875 MG PO BID       #14      * predniSONE 20 MG TABLET: 40 MG PO QDAY #10      * Carbamide Peroxide (Debrox Otic) 15 ML DROPS: 15 DROPS EAR LT BID #15         Instructions: LEAVE IN EAR FOR AT LEAST 15 MINUTES      * PANTOPRAZOLE (Protonix) 40 MG TABLET.DR: 40 MG PO BIDAC #60         Instructions: Take on an empty stomach.      Discontinued Medications      * Montelukast Sodium (Singulair*) 10 MG TAB: 10 MG PO HS #30      PLAN:      The patient is a  80 year old female with recurrent bronchitis, upper airway     infection with likely ear infection and wax covering the left eardrum and     gastroesophageal reflux disease.             1. History of recurrent bronchitis and persistent cough. Likely related to upper    respiratory infection and ear infection. I will start her on Augmentin 875 mg     twice daily for a week. I will give her prednisone for 5 days and start her on     Protonix 40 mg twice daily for 2 months. If her symptoms are not improving after    antibiotics, she may need CT scan of the chest and further work up. Continue     Mucinex if needed. Continue azelastine nasal spray and keep herself as active as    possible.       2. Follow up in 3-4 months, earlier if needed.            Patient Education      Education resources provided:  Yes      Patient Education Provided:  Acute Bronchitis            Electronically signed by Arnulfo Villaseñor  04/09/2021 16:55       Disclaimer: Converted document may not contain table formatting or lab diagrams. Please see [employer] for the authenticated document.

## 2021-05-28 NOTE — PROGRESS NOTES
Patient: CINTHIA DÍAZ     Acct: [id number]     Report: #[id number]  UNIT #: [id number]     : 1940    Encounter Date:04/10/2020  PRIMARY CARE: JESS COBB  ***Signed***  --------------------------------------------------------------------------------------------------------------------  TELEHEALTH NOTE      History of Present Illness            Chief Complaint: ACUTE CHEST CONGESTION, COUGH, SOA, CHRONIC BRONCHITIS            Cinthia Díaz is presenting for evaluation via Telehealth visit. Verbal     consent obtained before beginning visit.            Provider spent 14 minutes with the patient during telehealth visit.            The following staff were present during the visit: ALEX LEON CMA, JOANIE JUÁREZ             The patient is a 79 year old female, patient of Dr. Villaseñor's with a history of     recurrent bronchitis and pneumonia.  The patient also has a significant history     of chronic allergies.  The patient states that about one week ago she felt like     she had a right ear infection and now she feels like infection is going down     into her chest.  The patient states she gets short of breath, is coughing and     having intermittent wheezing along with postnasal drip, nasal congestion and     sinus congestion. The patient states she is only taking Claritin for her     allergies, has been out of Singulair and use to take Flonase. The patient states    that she was under the care of Dr. Randal Burnham for allergy shots, however he has     retired recently and she is no longer on allergy shots and her care has now been    switched over to [facility] and she is going to be following up     with them in 2020 to possibly restart shots.  The patient currently denies     any fever, chills, night sweats, hemoptysis, swollen glands in the head and     neck, chest pain, chest tightness, abdominal pain, nausea, vomiting, diarrhea,     body aches. The patient  denies any exposure to any ill contacts and denies any     recent travel. The patient states she is able to perform all of her ADLs without    difficulty.  The patient has also been written for omeprazole in the past     several times, however the patient continues to not use it.  The patient states     that she does not have reflux and does not want to take any omeprazole or any     medication for heartburn.              I have personally reviewed the review of systems, past family, social, surgical     and medical histories and I agree with those as entered in the chart.      Past Med History      HX: PULMONARY NODULE, BRONCHITIS      NEVER SMOKER      VACCINES - CURRENT      Overview of Symptoms      COMPLAINS OF: CHEST CONGESTION, COUGH, SOA      DENIES: FEVER, BODY ACHES, CHILLS            Plan/Instructions      Ambulatory Assessment/Plan:        Notes      New Medications      * Azithromycin Z-Tarik (Zithromax Z-Tarik) 250 MG TABLET: 250 MG PO ASDIR #1         Instructions: Take 500 mg (two tablets) by mouth the first day, then 250 mg        (one tablet) daily until all taken.      * Mario-Fluticasone (Fluticasone 50 mcg) 16 GM SPRAY.SUSP: 1 PUFFS NARE EACH BID       #1      Renewed Medications      * Montelukast Sodium (Singulair*) 10 MG TAB: 10 MG PO HS #30      Plan/Instructions            * Plan Of Care: ()            * Chronic conditions reviewed and taken into consideration for today's treatment       plan.      * Patient instructed to seek medical attention urgently for new or worsening       symptoms.      * Patient was educated/instructed on their diagnosis, treatment and medications       prior to discharge from the clinic today.            ASSESSMENT:       1. Recurrent bronchitis with acute exacerbation.      2.  Pneumonia.      3. History of 5 mm left lower lobe lung nodule and mediastinal lymphadenopathy.      All of the nodules and lymphadenopathy has been stable, the patient does not ne    ed repeat  imaging.      4. Persistent chronic allergies.      5.  History of small hiatal hernia on CT.      6. History of posterior pharyngeal wall erythema.            PLAN:      1. We will start patient on a Z-Tarik.      2.  We will restart patient on Singulair and restart patient on Flonase.      3. The patient is advised to follow [facility] with [facility] to begin     allergy shots as scheduled.      4. The patient unfortunately has not followed up in the office in over one year.     The patient is advised to follow up in the office as advised. The risks of not     following up as scheduled discussed with patient.  The patient verbalized     understanding and compliance.      5. Patient is advised to call the office, 911 or go to the ER with any new or     worsening symptoms.      6.  The patient is up to date on flu and pneumonia vaccines.      7.  Follow up with Dr. Villaseñor in 2-3 months, sooner if needed.      Codes:  Phone Eval 11-20 mi [id number]            Electronically signed by JOANIE ENGEL [facility]  04/17/2020 13:50       Disclaimer: Converted document may not contain table formatting or lab diagrams. Please see [employer] [facility] for the authenticated document.

## 2021-05-28 NOTE — PROGRESS NOTES
Patient: ERWIN VALADEZ     Acct: [id number]     Report: #[id number]  UNIT #: [id number]     : 1940    Encounter Date:2021  PRIMARY CARE: JESS COBB  ***Signed***  --------------------------------------------------------------------------------------------------------------------  Chief Complaint      Encounter Date      2021            Primary Care Provider      JESS COBB            Referring Provider      JESS COBB            Patient Complaint      Patient is complaining of      PT here today for acute increase cough            VITALS      Height 5 ft 4 in / 162.56 cm      Weight 174 lbs  / 78.493913 kg      BSA 1.84 m2      BMI 29.9 kg/m2      Temperature 96.5 F / 35.83 C - Temporal      Pulse 86      Respirations 15      Blood Pressure 130/54 Sitting, Right Arm      Pulse Oximetry 96%, room air      Initial Exhaled Nitrous Oxide      Date:  Mar 26, 2019            Repeated Exhaled Nitrous Oxide      Date:  2021      1st Repeated Nitrous Oxide Res:  19            HPI      The patient is a 80 year old female patient of Dr. Villaseñor's with a history of     recurrent bronchitis and pneumonia. The patient also has a significant history     of chronic allergies. The patient states that last week she was having some     chest heaviness and had a dry cough that was getting worse however she is     feeling much better than when she first made the appointment. The patient states    her dry cough has improved however she still has nasal congestion and postnasal     drip. The patient states she is scheduled to [facility] and recently had pulmonary function test at their office and will be     retested for allergies and possibly start allergy shots again. The patient     states she is taking Singulair, claritin and flonase for seasonal allergies     which helps but she would like to restart astelin nasal spray. The patient     denies  any wheezing,  fever or chills, night sweats, hemoptysis,  purulent sput    um production, swollen glands in head and neck, unintentional weight loss, chest    pain or chest tightness, abdominal pain, nausea or vomiting or diarrhea. The     patient denies  any headaches, myalgias, sore throat, changes in sense of taste     and smell any coronavirus or flu like symptoms.  The patient denies any leg     swelling, orthopnea or paroxysmal nocturnal dyspnea.  The patient denies any     reflux. The patient states she received her first COVID-19 vaccine and is     scheduled for the second one on 02/08/21. The patient states she is able to     perform her activities of daily living without difficulty. The patient denies     any exposure to any ill contacts and denies any recent travel.             I reviewed the Review of Systems, medical, surgical and family history and agree    with those as entered.      Copies To:   Arnulfo Villaseñor      Constitutional:  Denies: Fatigue, Fever, Weight gain, Weight loss, Chills,     Insomnia, Other      Respiratory/Breathing:  Complains of: Cough; Denies: Shortness of air, Wheezing,    Hemoptysis, Pleuritic pain, Other      Endocrine:  Denies: Polydipsia, Polyuria, Heat/cold intolerance, Abnorml     menstrual pattern, Diabetes, Other      Eyes:  Denies: Blurred vision, Vision Changes, Other      Ears, nose, mouth, throat:  Complains of: Post Nasal Drip; Denies: Mouth     lesions, Thrush, Throat pain, Hoarseness, Allergies/Hay Fever, Headaches, Recent    Head Injury, Nose Bleeding, Neck Stiffness, Thyroid Mass, Hearing Loss, Ear     Fullness, Dry Mouth, Nasal or Sinus Pain, Dry Lips, Nasal discharge, Nasal     congestion, Other      Cardiovascular:  Denies: Palpitations, Syncope, Claudication, Chest Pain, Wake     up Gasping for air, Leg Swelling, Irregular Heart Rate, Cyanosis, Dyspnea on     Exertion, Other      Gastrointestinal:  Denies: Nausea, Constipation, Diarrhea,  Abdominal pain,     Vomiting, Difficulty Swallowing, Reflux/Heartburn, Dysphagia, Jaundice,     Bloating, Melena, Bloody stools, Other      Genitourinary:  Denies: Urinary frequency, Incontinence, Hematuria, Urgency,     Nocturia, Dysuria, Testicular problems, Other      Musculoskeletal:  Denies: Joint Pain, Joint Stiffness, Joint Swelling, Myalgias,    Other      Hematologic/lymphatic:  DENIES: Lymphadenopathy, Bruising, Bleeding tendencies,     Other      Neurological:  Denies: Headache, Numbness, Weakness, Seizures, Other      Psychiatric:  Denies: Anxiety, Appropriate Effect, Depression, Other      Sleep:  No: Excessive daytime sleep, Morning Headache?, Snoring, Insomnia?, Stop    breathing at sleep?, Other      Integumentary:  Denies: Rash, Dry skin, Skin Warm to Touch, Other      Immunologic/Allergic:  Denies: Latex allergy, Seasonal allergies, Asthma,     Urticaria, Eczema, Other      Immunization status:  No: Up to date            FAMILY/SOCIAL/MEDICAL HX      Surgical History:  Yes: Abdominal Surgery (ABD HERNIA REPAIR 11/2010), Bladder     Surgery (BLADDER SLING 2018), Bowel Surgery (COLONOSCOPY), Cholecystectomy     (LAPAROSCOPIC 1994), Orthopedic Surgery (RIGHT KNEE SCOPE), Other Surgeries     (GALL BLADDER YRS AGO); No: AAA Repair, Adenoids, Angioplasty, Appendectomy,     Back Surgery, Breast Surgery, CABG, Carotid Stenosis, Ear Surgery, Eye Surgery,     Head Surgery, Hernia Surgery, Kidney Surgery, Nose Surgery, Oral Surgery,     Prostatectomy, Rectal Surgery, Spinal Surgery, Testicular Surgery, Throat     Surgery, Tonsils, Valve Replacement, Vascular Surgery      Stroke - Family Hx:  Mother      Diabetes - Family Hx:  Mother      Cancer/Type - Family Hx:  Father      Other Family Medical History:  Brother      Smoking status:  Never smoker      Anticoagulation Therapy:  No      Antibiotic Prophylaxis:  No      Medical History:  Yes: Arthritis (KNEES), Diabetes (TYPE II,  AM -120),      Hemorrhoids/Rectal Prob (H/O COLON POLYPS, REFLUX), High Blood Pressure (ON MED     ); No: Anemia, Asthma, Blood Disease, Broken Bones, Cataracts, Chemical     Dependency, Chemotherapy/Cancer, Chronic Bronchitis/COPD, Emphysema, Chronic     Liver Disease, Colon Trouble, Colitis, Diverticulitis, Congestive Heart Failu,     Deafness or Ringing Ears, Depression, Anxiety, Bipolar Disorder, PTSD, Epilepsy,    Seizures, Glaucoma, Gall Stones, Gout, Head Injury, Heart Attack, Heart Murmur,     GERD, Hepatitis, Hiatal Hernia, HIV (Do not ask - volu, Kidney or Bladder     Disease, Kidney Stones, Migrane Headaches, Mitral Valve Prolapse, Psychiatric     Care, Reflux Disease, Rheumatic Fever, Sexually Transmitted Dis, Shortness Of     Breath, Sinus Trouble, Skin Disease/Psoriais/Ecz, Stroke, Thyroid Problem,     Tuberculosis or Pos TB Te, Miscellaneous Medical/oth      Psychiatric History      none            PREVENTION      Hx Influenza Vaccination:  Yes      Date Influenza Vaccine Given:  Nov 1, 2020      Influenza Vaccine Declined:  No      2 or More Falls in Past Year?:  No      Fall Past Year with Injury?:  No      Hx Pneumococcal Vaccination:  Yes      Encouraged to follow-up with:  PCP regarding preventative exams.      Chart initiated by      Shelby Turner CMA            ALLERGIES/MEDICATIONS      Allergies:        Coded Allergies:             CODEINE (Verified  Adverse Reaction, Unknown, VOMITING, 1/13/21)           MORPHINE (Verified  Adverse Reaction, Unknown, VOMITING, 12/18/19)      Medications    Last Reconciled on 1/27/21 14:54 by Vladimir DIXONelastine Hcl (Azelastine Nasal) 137 Mcg/0.137 Ml Spray.pump      1 PUFFS NARE EACH BID, #30 ML 5 Refills         Prov: JOANIE ENGEL [facility]         1/27/21       Oxybutynin Chloride (Oxybutynin Chloride) 5 Mg Tablet      5 MG PO QDAY, TAB         Reported         1/27/21       Mario-Fluticasone (Fluticasone 50 mcg) 16 Gm Spray.susp      1 PUFFS NARE EACH  BID, #1 BOTTLE 4 Refills         Prov: JOANIE ENGEL [facility]         4/10/20       Montelukast Sodium (Singulair*) 10 Mg Tab      10 MG PO HS, #30 TAB 9 Refills         Prov: JOANIE ENGEL [facility]         4/10/20       Aspirin Chew (Aspirin Baby) 81 Mg Tab.chew      81 MG PO QDAY, #30 TAB.CHEW 0 Refills         Reported         12/2/19       Levothyroxine Sodium (Levoxyl*) 0.075 Mg Tablet      0.075 MG PO QDAY@07, #30 TAB 0 Refills         Reported         12/2/19       Metoprolol Succinate (Metoprolol Succinate) 50 Mg Tab.er.24h      25 MG PO QDAY, #15 TAB.SR.24H 0 Refills         Reported         9/19/18       Ergocalciferol (Vitamin D2) 50,000 Unit Capsule      06683 UNITS PO FR@09, #4 CAP 0 Refills         Reported         4/2/18       Doxazosin Mesylate (Doxazosin Mesylate) 4 Mg Tablet      4 MG PO HS, TAB         Reported         4/2/18       metFORMIN HCl (metFORMIN HCl) 500 Mg Tablet      500 MG PO BID, #60 TAB 0 Refills         Reported         7/13/15       Loratadine (Loratadine) 10 Mg Tablet      10 MG PO BID PRN for ALLERGIES         Reported         2/7/13       metFORMIN HCl/SITagliptin Phos  (Janumet  MG) 1 Tab Tablet      1 TAB PO BID         Reported         4/29/11       Atorvastatin Calcium (Lipitor*) 20 Mg Tablet      20 MG PO QHS         Reported         1/22/09      Current Medications      Current Medications Reviewed 1/27/21            EXAM      Vital Signs Reviewed      Gen: WDWN, Alert, NAD.        HEENT:  PERRL, EOMI.  OP, nares clear, no sinus tenderness.      Neck:  Supple, no JVD, no thyromegaly.      Lymph: No axillary, cervical, supraclavicular lymphadenopathy noted bilaterally.      Chest: Lungs clear to auscultation bilaterally, no wheezes, rales or rhonchi,     normal work of breathing noted, patient able to speak full sentences without     difficulty.        CV:  RRR, no MGR, pulses 2+, equal.      Abd:  Soft, NT, ND, + BS, no HSM.      EXT:  No clubbing, no cyanosis,  no edema, no joint tenderness.       Neuro:  A  Skin: No rashes or lesions.      Vtials      Vitals:             Height 5 ft 4 in / 162.56 cm           Weight 174 lbs  / 78.347922 kg           BSA 1.84 m2           BMI 29.9 kg/m2           Temperature 96.5 F / 35.83 C - Temporal           Pulse 86           Respirations 15           Blood Pressure 130/54 Sitting, Right Arm           Pulse Oximetry 96%, room air            REVIEW      Results Reviewed      [facility] Results Reviewed?:  Yes Prev Lab Results, Yes Prev Radiology Results, Yes     Previous [facility] Records      Lab Results      I personally reviewed my last Telehealth visit note.            Assessment      Cough - R05            Notes      New Medications      * Oxybutynin Chloride 5 MG TABLET: 5 MG PO QDAY      * AZELASTINE HCL (Azelastine Nasal) 137 MCG/0.137 ML SPRAY.PUMP: 1 PUFFS NARE       EACH BID #30      Discontinued Medications      * Azithromycin Z-Tarik (Zithromax Z-Tarik) 250 MG TABLET: 250 MG PO ASDIR #1         Instructions: Take 500 mg (two tablets) by mouth the first day, then 250 mg        (one tablet) daily until all taken.      New Diagnostics      * Chest 2 View, 1 DAY         Dx: Cough - R05      * CBC With Auto Diff, Routine         Dx: Cough - R05      * Immunoglobulin  E (I, 1 DAY         Dx: Cough - R05      ASSESSMENT:      1. History of recurrent bronchitis.       2. History of pneumonia.      3. History of 5 mm left lower lobe lung nodule and mediastinal lymphadenopathy.     All the nodules and lymphadenopathy have been stable and the patient does not     need repeat imaging.       4. Dry cough.       5. Persistent chronic allergies under the care of [facility].      6. History of small hiatal hernia on CT scan.       7. History of posterior pharyngeal wall erythema.             PLAN:      1. Continue claritin, flonase and Singulair everyday as prescribed.       2. Follow up with [facility] as scheduled. I  will request a copy     of pulmonary function test to be faxed to our office.       3. I will order a chest x-ray for dry cough.       4. I will restart astelin nasal spray.       5. I will order a CBC and IgE level.       6. The patient is advised to call the office, call 911 or go to the ER for any     new or worsening symptoms.       7. The patient reports she is up to date with flu and pneumonia vaccines. The     patient reports she had her first COVID-19 vaccine and is scheduled for her     second one 02/08/21.       8. The patient is advised to call the office, call 911 or go to the ER for any     new or worsening symptoms.       9. A FENO was performed in the office today with a level of 19 signifying no      eosinophilic airway inflammation.       10. Follow up with Dr. Villaseñor in 2-3 months, sooner if needed.            Patient Education      Patient Education Provided:  Acute Bronchitis      Time Spent:  > 50% /Coord Care            Electronically signed by JOANIE ENGEL [facility]  02/02/2021 14:40       Disclaimer: Converted document may not contain table formatting or lab diagrams. Please see [employer] [facility] for the authenticated document.

## 2021-05-28 NOTE — PROGRESS NOTES
Patient: ERWIN VALADEZ     Acct: [id number]     Report: #[id number]  UNIT #: [id number]     : 1940    Encounter Date:2019  PRIMARY CARE: JESS COBB  ***Signed***  --------------------------------------------------------------------------------------------------------------------  Chief Complaint      Encounter Date      Mar 26, 2019            Primary Care Provider      JESS COBB            Referring Provider      JESS COBB            Patient Complaint      Patient is complaining of      cough            VITALS      Height 5 ft 4 in / 162.56 cm      Weight 168 lbs 0 oz / 76.111038 kg      BSA 1.82 m2      BMI 28.8 kg/m2      Temperature 98.4 F / 36.89 C - Oral      Pulse 113      Respirations 16      Blood Pressure 140/68 Sitting, Right Arm      Pulse Oximetry 96%, room air      Initial Exhaled Nitrous Oxide      Date:  Mar 26, 2019      Exhaled Nitrous Oxide Results:  18            HPI      The patient is a 76 year old female with recurrent bronchitis and pneumonia, a 5    mm left lung nodule and mediastinal lymphadenopathy.  She also has lots of     allergies. Her CT scan showed a small hiatal hernia and had posterior pharyngeal    wall erythema. She had a right ear infection around Thanksgiving time.  Since     then, she has been having cough that has not gone away. Her cough is dry,     usually worse at night, has no shortness of breath, and postnasal drip as well.     She has no fever, chills, nausea or vomiting. I have written for omeprazole in     the past and she did not use it.  I asked her about Tussionex and Tessalon     Perles, but she not remember taking any of this.  She goes to  [facility] for     allergies and has been on allergy shots for more than 30 years.  She feels like     she has problems with allergies and wants to have allergy test again. She wants     to go to [facility] now. She has no chest pain or chest     tightness, no nausea  or vomiting.            ROS      Constitutional:  Complains of: Fatigue; Denies: Fever, Weight gain, Weight loss,    Chills, Insomnia, Other      Respiratory/Breathing:  Complains of: Cough; Denies: Shortness of air, Wheezing,    Hemoptysis, Pleuritic pain, Other      Endocrine:  Denies: Polydipsia, Polyuria, Heat/cold intolerance, Abnorml     menstrual pattern, Diabetes, Other      Eyes:  Denies: Blurred vision, Vision Changes, Other      Ears, nose, mouth, throat:  Denies: Mouth lesions, Thrush, Throat pain,     Hoarseness, Allergies/Hay Fever, Post Nasal Drip, Headaches, Recent Head Injury,    Nose Bleeding, Neck Stiffness, Thyroid Mass, Hearing Loss, Ear Fullness, Dry     Mouth, Nasal or Sinus Pain, Dry Lips, Nasal discharge, Nasal congestion, Other      Cardiovascular:  Denies: Palpitations, Syncope, Claudication, Chest Pain, Wake     up Gasping for air, Leg Swelling, Irregular Heart Rate, Cyanosis, Dyspnea on     Exertion, Other      Gastrointestinal:  Denies: Nausea, Constipation, Diarrhea, Abdominal pain,     Vomiting, Difficulty Swallowing, Reflux/Heartburn, Dysphagia, Jaundice,     Bloating, Melena, Bloody stools, Other      Genitourinary:  Denies: Urinary frequency, Incontinence, Hematuria, Urgency,     Nocturia, Dysuria, Testicular problems, Other      Musculoskeletal:  Denies: Joint Pain, Joint Stiffness, Joint Swelling, Myalgias,    Other      Hematologic/lymphatic:  DENIES: Lymphadenopathy, Bruising, Bleeding tendencies,     Other      Neurological:  Denies: Headache, Numbness, Weakness, Seizures, Other      Psychiatric:  Denies: Anxiety, Appropriate Effect, Depression, Other      Sleep:  No: Excessive daytime sleep, Morning Headache?, Snoring, Insomnia?, Stop    breathing at sleep?, Other      Integumentary:  Denies: Rash, Dry skin, Skin Warm to Touch, Other      Immunologic/Allergic:  Denies: Latex allergy, Seasonal allergies, Asthma,     Urticaria, Eczema, Other      Immunization status:  No: Up to  date            FAMILY/SOCIAL/MEDICAL HX      Current History      Lives by herself. No pets. .      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      Occasional alcohol. No illicit drug use.      Surgical History:  Yes: Abdominal Surgery (ABD HERNIA REPAIR 11/2010), Bowel     Surgery (COLONOSCOPY), Cholecystectomy (LAPAROSCOPIC 1994), Orthopedic Surgery     (RIGHT KNEE SCOPE), Other Surgeries (GALL BLADDER YRS AGO); No: AAA Repair,     Adenoids, Angioplasty, Appendectomy, Back Surgery, Bladder Surgery, Breast     Surgery, CABG, Carotid Stenosis, Ear Surgery, Eye Surgery, Head Surgery, Hernia     Surgery, Kidney Surgery, Nose Surgery, Oral Surgery, Prostatectomy, Rectal     Surgery, Spinal Surgery, Testicular Surgery, Throat Surgery, Tonsils, Valve     Replacement, Vascular Surgery      Stroke - Family Hx:  Mother      Diabetes - Family Hx:  Mother      Cancer/Type - Family Hx:  Father (colon)      Other Family Medical History:  Brother (copd)      Is Father Still Living?:  No      Is Mother Still Living?:  No       Family History:  Yes      Social History:  No Tobacco Use, No Alcohol Use, No Recreational Drug use      Smoking status:  Never smoker      Anticoagulation Therapy:  No      Antibiotic Prophylaxis:  No      Medical History:  Yes: Arthritis (KNEES), Diabetes (TYPE II,  AM -120),     Hemorrhoids/Rectal Prob (H/O COLON POLYPS), High Blood Pressure (ON MED ); No:     Alcoholism, Allergies, Anemia, Asthma, Atrial Fibrillation, Blood Disease,     Broken Bones, Cataracts, Chemical Dependency, Chemotherapy/Cancer, Chronic     Bronchitis/COPD, Emphysema, Chronic Liver Disease, Colon Trouble, Colitis,     Diverticulitis, Congestive Heart Failu, Deafness or Ringing Ears, Convulsions,     Depression, Anxiety, Bipolar Disorder, PTSD, Epilepsy, Seizures, Forgetfullness,    Glaucoma, Gall Stones, Gout, Head Injury, Heart Attack, Heart Murmur, GERD,     Hepatitis, Hiatal Hernia, High Cholesterol, HIV (Do not ask -  volu, Jaundice,     Kidney or Bladder Disease, Kidney Stones, Migrane Headaches, Mitral Valve     Prolapse, Night sweats, Phlebitis, Psychiatric Care, Reflux Disease, Rheumatic     Fever, Sexually Transmitted Dis, Shortness Of Breath, Sinus Trouble, Skin     Disease/Psoriais/Ecz, Stroke, Thyroid Problem, Tuberculosis or Pos TB Te,     Miscellaneous Medical/oth      Psychiatric History      none            PREVENTION      Hx Influenza Vaccination:  Yes      Date Influenza Vaccine Given:  Nov 1, 2018      Influenza Vaccine Declined:  No      2 or More Falls Past Year?:  No      Fall Past Year with Injury?:  No      Hx Pneumococcal Vaccination:  Yes      Encouraged to follow-up with:  PCP regarding preventative exams.      Chart initiated by      kendra murillo ma            ALLERGIES/MEDICATIONS      Allergies:        Coded Allergies:             CODEINE (Verified  Adverse Reaction, Unknown, VOMITING, 3/26/19)           MORPHINE (Verified  Adverse Reaction, Unknown, VOMITING, 3/26/19)      Medications    Last Reconciled on 3/26/19 15:42 by SUDARSHAN MASSEY MD      Metoprolol Succinate (Metoprolol Succinate*) 50 Mg Tab.er.24h      25 MG PO QDAY, #15 TAB.SR.24H 0 Refills         Reported         9/19/18       Ergocalciferol (Ergocalciferol*) 50,000 Unit Capsule      52406 UNITS PO FR@09, #4 CAP 0 Refills         Reported         4/2/18       Doxazosin Mesylate (Doxazosin Mesylate) 4 Mg Tablet      4 MG PO HS, TAB         Reported         4/2/18       Levothyroxine Sodium (Levoxyl*) 0.05 Mg Tablet      0.05 MG PO QDAY@07, #30 TAB 0 Refills         Reported         11/9/16       Metformin Hcl (Metformin Hcl) 500 Mg Tablet      500 MG PO BID, #60 TAB 0 Refills         Reported         7/13/15       Loratadine (Loratadine) 10 Mg Tablet      10 MG PO BID PRN for ALLERGIES         Reported         2/7/13       Acetaminophen (Tylenol) 325 Mg Tablet      1 TAB PO prn PRN for PAIN, TAB         Reported         1/16/12        metFORMIN HCl/sitaGLIPtin Phos (Janumet 50/500 MG) 1 Tab Tablet      1 TAB PO BID         Reported         4/29/11       Atorvastatin Calcium (Lipitor*) 20 Mg Tablet      20 MG PO QHS         Reported         1/22/09      Current Medications      Current Medications Reviewed 3/26/19            EXAM      CONSTITUTIONAL: Pleasant female in no acute distress normal conversant.       EYES : Pink conjunctive, no ptosis, PERRL.       ENMT :Mallampati classification II, no sinus tenderness.   Nose and ears appear     normal, normal dentition,  posterior pharyngeal wall erythema.      Neck: Nontender, no masses, no thyromegaly, no nodules.      Resp : Normal resp effort, B/l vesicular breath sounds, resonant to percussion     bilaterally, no wheezing, crackles or rhonchi.      CVS  : No carotid bruits, s1s2 nl, RRR, no murmur, rubs or gallop, no peripheral    edema       Chest wall: Normal rise with inspiration, nontender on palpation      GI   : Abdomen soft, with no masses, no hepatosplenomegaly, no hernias, BS+      MSK  : Normal gait and station, no digital cyanosis or clubbing       Skin : No rashes, ulcerations or lesions, normal turgor and temperature      Neuro: CN II - XII intact, no sensory deficits, DTRs intact and symmetrical, no     motor weakness      Psych: Appropriate affect, A   Vtials      Vitals:             Height 5 ft 4 in / 162.56 cm           Weight 168 lbs 0 oz / 76.761546 kg           BSA 1.82 m2           BMI 28.8 kg/m2           Temperature 98.4 F / 36.89 C - Oral           Pulse 113           Respirations 16           Blood Pressure 140/68 Sitting, Right Arm           Pulse Oximetry 96%, room air            REVIEW      Results Reviewed      P[facility]S Results Reviewed?:  Yes Prev Lab Results, Yes Prev Radiology Results, Yes     Previous Mecial Records      Lab Results      The patient has lots of allergens.      Radiographic Results                     [facility]                    [facility]                PACS RADIOLOGY REPORT            Patient: ERWIN VALADEZ   Acct: #[id number]   Report: #[id number]            UNIT #: [id number]    DOS: 17       INSURANCE:MEDICARE PART A   LOCATION:CT     : 1940            PROVIDERS      ADMITTING:     FAMILY:  JESS COBB         ORDERING:  Arnulfo Villaseñor   OTHER:       DICTATING:  AGUSTIN PUGH MD            REQ #:[id number]    CPT CODE:65911   EXAM:[facility] - CT CHEST without CONTRAST      REASON FOR EXAM:  LUNG NODULE      REASON FOR VISIT:  LUNG NODULE            *******Signed******               PROCEDURE:   CT CHEST WITHOUT CONTRAST             COMPARISON:   [facility], CT, CHEST W/O CONTRAST, 2016,     11:36.             INDICATIONS:   LUNG NODULE             TECHNIQUE:   CT images were created without the administration of contrast     material.               PROTOCOL:                      Standard imaging protocol performed             RADIATION DOSAGE:    CTDIvol: 16.06 mGy    DLP: 552 mGy*cm                                                     Automated exposure control was     utilized to minimize       radiation dose.             FINDINGS:         There is a stable 5 mm noncalcified pulmonary nodule in the left lower lobe on     image #44. There is       a stable 4 mm subpleural nodule within the left pulmonary apex on image #10.     There are few       subpleural linear densities seen in the upper lobes and especially within the     pulmonary apices felt       to represent scarring. No new pulmonary nodules or masses are apparent. There is    no airspace       disease to indicate pneumonia. There are few non-pathologically enlarged lymph     nodes seen within       the mediastinum. There are atherosclerotic vascular calcifications including     involvement of the       coronary arteries. The patient appears to have a tiny pericardial effusion.     There are no acute       findings seen beneath  the hemidiaphragms. There is a round hypodense mass within    the left lobe of       the liver on image #53 measuring 1.4 cm. This is stable and probably represents     either a small cyst       or cavernous hemangioma. No suspicious osteolytic or sclerotic lesions are brianna    arent within the bony       thorax.             CONCLUSION:         1. Stable pulmonary nodules as described. No followup is warranted after 2 year     stability of the       pulmonary nodules is established.      2. Mild upper lobe scarring.      3. Tiny pericardial effusion.      4. Additional incidental findings as described. Study is limited by     noncontrasted technique.              AGUSTIN PUGH MD             Electronically Signed and Approved By: AGUSTIN PUGH MD on 2/16/2017 at 12:52                        Until signed, this is an unconfirmed preliminary report that may contain      errors and is subject to change.                                              WORBR:      D:02/16/17 1252      PFT Results      [id number]  [id number]                                       [facility]                          [facility] [employer] [facility]                            [place], [place]  [address]               __________________________________________________________________________             Patient Name:                   Attending Physician:      Cinthia Díaz M.D.             Patient Visit # MR #            Admit Date  Disch Date     Location      [id number]    [id number]      05/16/2016                 [facility]- -             Date of Birth      1940      __________________________________________________________________________      821 - DIAGNOSTIC REPORT             PULMONARY FUNCTION TEST             Date of service:  05/16/2016             SPIROMETRY:      Spirometry is normal.      FEV1/FVC 77.      FEV1 is 1.97 L, 97% of predicted.      FVC is 2.57 L, 95% of  predicted.      Bronchodilator response:  There is no significant response to bronchodilator      administration.             LUNG VOLUMES:      Lung volumes show air trapping.      Total lung capacity is 4.68 L, 95% of predicted.      Residual volume is 2.09 L, 93% of predicted.             DIFFUSION:      Diffusion capacity is normal.             FLOW VOLUME LOOP:      Flow volume loop is normal.             CONCLUSION:      Normal spirometry with normal lung volumes and normal diffuse capacity.      Normal study.  Please correlate clinically.             To be electronically signed in [employer]      [id number] ARNULFO VILLASEÑOR M.D.             NK:ken      D:  05/23/2016 08:35      T:  05/23/2016 11:14      #[id number]             Until signed, this is an unconfirmed preliminary report that may contain      errors and is subject to change.                   06/02/16 1549  <Electronically signed by Arnulfo Villaseñor MD>            Assessment      Allergic rhinitis - J30.9            Notes      New Medications      * AZELASTINE HCL (Azelastine Nasal) 137 MCG/0.137 ML SPRAY.PUMP: 2 PUFFS NARE       EACH BID #1      * Benzonatate (Tessalon Perles) 100 MG CAP: 100 MG PO TID #120      * Montelukast Sodium (Singulair*) 10 MG TAB: 10 MG PO HS #30      * Omeprazole (Omeprazole*) 20 MG TABLET.DR: 20 MG PO BID #60      New Referrals      * Immunology/Allergy, SCHEDULED PROCEDURE         Dx: Allergic rhinitis - J30.9      PLAN:       The patient is a 78 year old female with recurrent bronchitis and pneumonia with    a 5 mm left lower lobe lung nodule and mediastinal lymphadenopathy. She had lot     of allergies. Her CT scan showed small hiatal hernia and posterior pharyngeal     wall erythema.             1. Recurrent pneumonia and bronchitis. I will start her on Tessalon Perles three    times a day.  I will also start her on Azelastine nasal spray. I will start her     on Singulair once daily.  I will start her on Omeprazole 20 mg twice a day.             2. Persistent allergies, I will refer her to [facility].  She    wants to be seen by a new allergist and wants to have a skin allergy skin test     repeated. She has been on allergy shorts for many years.              3.  I will follow up with her in 4-6 months, earlier if needed.               4.  She needs to keep the head of bed up with sleep and not eat lots of spicy     food.            Patient Education      Education resources provided:  Yes      Patient Education Provided:  Acute Bronchitis                 Disclaimer: Converted document may not contain table formatting or lab diagrams. Please see [employer] for the authenticated document.

## 2021-08-03 ENCOUNTER — OFFICE VISIT (OUTPATIENT)
Dept: PULMONOLOGY | Facility: CLINIC | Age: 81
End: 2021-08-03

## 2021-08-03 VITALS
HEIGHT: 64 IN | DIASTOLIC BLOOD PRESSURE: 58 MMHG | RESPIRATION RATE: 16 BRPM | SYSTOLIC BLOOD PRESSURE: 137 MMHG | WEIGHT: 170 LBS | OXYGEN SATURATION: 96 % | BODY MASS INDEX: 29.02 KG/M2 | TEMPERATURE: 97.2 F | HEART RATE: 80 BPM

## 2021-08-03 DIAGNOSIS — T78.40XD ALLERGY, SUBSEQUENT ENCOUNTER: ICD-10-CM

## 2021-08-03 DIAGNOSIS — J41.1 BRONCHITIS, MUCOPURULENT RECURRENT (HCC): ICD-10-CM

## 2021-08-03 DIAGNOSIS — K44.9 HIATAL HERNIA: ICD-10-CM

## 2021-08-03 DIAGNOSIS — Z87.01 HISTORY OF PNEUMONIA: Primary | ICD-10-CM

## 2021-08-03 DIAGNOSIS — R91.1 LUNG NODULE: ICD-10-CM

## 2021-08-03 PROBLEM — T78.40XA ALLERGIES: Status: ACTIVE | Noted: 2021-08-03

## 2021-08-03 PROCEDURE — 99213 OFFICE O/P EST LOW 20 MIN: CPT | Performed by: NURSE PRACTITIONER

## 2021-08-03 RX ORDER — LEVOTHYROXINE SODIUM 0.05 MG/1
TABLET ORAL
COMMUNITY
End: 2021-08-03

## 2021-08-03 RX ORDER — OMEPRAZOLE 20 MG/1
CAPSULE, DELAYED RELEASE ORAL
COMMUNITY
End: 2021-08-03

## 2021-08-03 RX ORDER — ERGOCALCIFEROL 1.25 MG/1
1000 CAPSULE ORAL 2 TIMES DAILY
COMMUNITY
Start: 2021-05-17 | End: 2023-01-23

## 2021-08-03 RX ORDER — BENZONATATE 100 MG/1
CAPSULE ORAL
COMMUNITY
End: 2021-08-03

## 2021-08-03 RX ORDER — DOXAZOSIN 2 MG/1
TABLET ORAL
COMMUNITY
End: 2022-02-25 | Stop reason: ALTCHOICE

## 2021-08-03 RX ORDER — AZELASTINE 1 MG/ML
SPRAY, METERED NASAL
COMMUNITY
End: 2022-02-25

## 2021-08-03 RX ORDER — LEVOCETIRIZINE DIHYDROCHLORIDE 5 MG/1
5 TABLET, FILM COATED ORAL DAILY
COMMUNITY
Start: 2021-07-16 | End: 2022-02-25

## 2021-08-03 RX ORDER — METOPROLOL SUCCINATE 25 MG/1
25 TABLET, EXTENDED RELEASE ORAL DAILY
COMMUNITY
Start: 2021-06-29

## 2021-08-03 RX ORDER — ATORVASTATIN CALCIUM 20 MG/1
20 TABLET, FILM COATED ORAL DAILY
COMMUNITY
Start: 2021-07-19

## 2021-08-03 RX ORDER — FLUTICASONE PROPIONATE 50 MCG
1 SPRAY, SUSPENSION (ML) NASAL DAILY PRN
COMMUNITY

## 2021-08-03 RX ORDER — LEVOTHYROXINE SODIUM 0.07 MG/1
75 TABLET ORAL DAILY
COMMUNITY

## 2021-08-03 RX ORDER — DOXAZOSIN MESYLATE 4 MG/1
TABLET ORAL
COMMUNITY
End: 2021-08-03

## 2021-08-03 RX ORDER — FAMOTIDINE 20 MG/1
20 TABLET, FILM COATED ORAL
COMMUNITY
Start: 2021-07-16 | End: 2021-08-03

## 2021-08-03 RX ORDER — OXYBUTYNIN CHLORIDE 5 MG/1
TABLET, EXTENDED RELEASE ORAL
COMMUNITY
Start: 2021-01-22 | End: 2022-02-25

## 2021-08-03 RX ORDER — SITAGLIPTIN AND METFORMIN HYDROCHLORIDE 500; 50 MG/1; MG/1
1 TABLET, FILM COATED ORAL 2 TIMES DAILY
COMMUNITY
Start: 2021-07-29 | End: 2022-02-25 | Stop reason: ALTCHOICE

## 2021-08-03 RX ORDER — MONTELUKAST SODIUM 10 MG/1
TABLET ORAL
COMMUNITY
End: 2021-08-03

## 2021-08-03 RX ORDER — ASPIRIN 81 MG/1
81 TABLET ORAL DAILY
COMMUNITY

## 2021-08-03 RX ORDER — DILTIAZEM HYDROCHLORIDE 120 MG/1
CAPSULE, EXTENDED RELEASE ORAL
COMMUNITY
End: 2021-08-03

## 2021-08-03 RX ORDER — SITAGLIPTIN AND METFORMIN HYDROCHLORIDE 1000; 50 MG/1; MG/1
TABLET, FILM COATED ORAL
COMMUNITY
End: 2021-08-03

## 2021-08-03 RX ORDER — ALBUTEROL SULFATE 90 UG/1
1 AEROSOL, METERED RESPIRATORY (INHALATION) EVERY 4 HOURS PRN
COMMUNITY
End: 2023-01-23

## 2021-08-03 RX ORDER — LEVOTHYROXINE SODIUM 0.03 MG/1
TABLET ORAL
COMMUNITY
End: 2021-08-03

## 2021-08-03 NOTE — PATIENT INSTRUCTIONS
Allergies, Adult  An allergy is a condition in which the body's defense system (immune system) comes in contact with an allergen and reacts to it. An allergen is anything that causes an allergic reaction. Allergens cause the immune system to make proteins for fighting infections (antibodies). These antibodies cause cells to release chemicals called histamines that set off the symptoms of an allergic reaction.  Allergies often affect the nasal passages (allergic rhinitis), eyes (allergic conjunctivitis), skin (atopic dermatitis), and stomach. Allergies can be mild, moderate, or severe. They cannot spread from person to person. Allergies can develop at any age and may be outgrown.  What are the causes?  This condition is caused by allergens. Common allergens include:  · Outdoor allergens, such as pollen, car fumes, and mold.  · Indoor allergens, such as dust, smoke, mold, and pet dander.  · Other allergens, such as foods, medicines, scents, insect bites or stings, and other skin irritants.  What increases the risk?  You are more likely to develop this condition if you have:  · Family members with allergies.  · Family members who have any condition that may be caused by allergens, such as asthma. This may make you more likely to have other allergies.  What are the signs or symptoms?  Symptoms of this condition depend on the severity of the allergy.  Mild to moderate symptoms  · Runny nose, stuffy nose (nasal congestion), or sneezing.  · Itchy mouth, ears, or throat.  · A feeling of mucus dripping down the back of your throat (postnasal drip).  · Sore throat.  · Itchy, red, watery, or puffy eyes.  · Skin rash, or itchy, red, swollen areas of skin (hives).  · Stomach cramps or bloating.  Severe symptoms  Severe allergies to food, medicine, or insect bites may cause anaphylaxis, which can be life-threatening. Symptoms include:  · A red (flushed) face.  · Wheezing or coughing.  · Swollen lips, tongue, or mouth.  · Tight or  swollen throat.  · Chest pain or tightness, or rapid heartbeat.  · Trouble breathing or shortness of breath.  · Pain in the abdomen, vomiting, or diarrhea.  · Dizziness or fainting.  How is this diagnosed?  This condition is diagnosed based on your symptoms, your family and medical history, and a physical exam. You may also have tests, including:  · Skin tests to see how your skin reacts to allergens that may be causing your symptoms. Tests include:  ? Skin prick test. For this test, an allergen is introduced to your body through a small opening in the skin.  ? Intradermal skin test. For this test, a small amount of allergen is injected under the first layer of your skin.  ? Patch test. For this test, a small amount of allergen is placed on your skin. The area is covered and then checked after a few days.  · Blood tests.  · A challenge test. For this test, you will eat or breathe in a small amount of allergen to see if you have an allergic reaction.  You may also be asked to:  · Keep a food diary. This is a record of all the foods, drinks, and symptoms you have in a day.  · Try an elimination diet. To do this:  ? Remove certain foods from your diet.  ? Add those foods back one by one to find out if any foods cause an allergic reaction.  How is this treated?         Treatment for allergies depends on your symptoms. Treatment may include:  · Cold, wet cloths (cold compresses) to soothe itching and swelling.  · Eye drops or nasal sprays.  · Nasal irrigation to help clear your mucus or keep the nasal passages moist.  · A humidifier to add moisture to the air.  · Skin creams to treat rashes or itching.  · Oral antihistamines or other medicines to block the reaction or to treat inflammation.  · Diet changes to remove foods that cause allergies.  · Being exposed again and again to tiny amounts of allergens to help you build a defense against it (tolerance). This is called immunotherapy. Examples include:  ? Allergy shot.  You receive an injection that contains an allergen.  ? Sublingual immunotherapy. You take a small dose of allergen under your tongue.  · Emergency injection for anaphylaxis. You give yourself a shot using a syringe (auto-injector) that contains the amount of medicine you need. Your health care provider will teach you how to give yourself an injection.  Follow these instructions at home:  Medicines    · Take or apply over-the-counter and prescription medicines only as told by your health care provider.  · Always carry your auto-injector pen if you are at risk of anaphylaxis. Give yourself an injection as told by your health care provider.  Eating and drinking  · Follow instructions from your health care provider about eating or drinking restrictions.  · Drink enough fluid to keep your urine pale yellow.  General instructions  · Wear a medical alert bracelet or necklace to let others know that you have had anaphylaxis before.  · Avoid known allergens whenever possible.  · Keep all follow-up visits as told by your health care provider. This is important.  Contact a health care provider if:  · Your symptoms do not get better with treatment.  Get help right away if:  · You have symptoms of anaphylaxis. These include:  ? Swollen mouth, tongue, or throat.  ? Pain or tightness in your chest.  ? Trouble breathing or shortness of breath.  ? Dizziness or fainting.  ? Severe abdominal pain, vomiting, or diarrhea.  These symptoms may represent a serious problem that is an emergency. Do not wait to see if the symptoms will go away. Get medical help right away. Call your local emergency services (911 in the [place] Do not drive yourself to the hospital.  Summary  · Take or apply over-the-counter and prescription medicines only as told by your health care provider.  · Avoid known allergens when possible.  · Always carry your auto-injector pen if you are at risk of anaphylaxis. Give yourself an injection as told by your health care  provider.  · Wear a medical alert bracelet or necklace to let others know that you have had anaphylaxis before.  · Anaphylaxisis a life-threatening emergency. Get help right away.  This information is not intended to replace advice given to you by your health care provider. Make sure you discuss any questions you have with your health care provider.  Document Revised: 10/28/2020 Document Reviewed: 10/28/2020  Elsevier Patient Education © 2021 [employer]

## 2021-08-12 PROCEDURE — U0005 INFEC AGEN DETEC AMPLI PROBE: HCPCS | Performed by: EMERGENCY MEDICINE

## 2021-08-12 PROCEDURE — U0003 INFECTIOUS AGENT DETECTION BY NUCLEIC ACID (DNA OR RNA); SEVERE ACUTE RESPIRATORY SYNDROME CORONAVIRUS 2 (SARS-COV-2) (CORONAVIRUS DISEASE [COVID-19]), AMPLIFIED PROBE TECHNIQUE, MAKING USE OF HIGH THROUGHPUT TECHNOLOGIES AS DESCRIBED BY CMS-2020-01-R: HCPCS | Performed by: EMERGENCY MEDICINE

## 2021-08-30 ENCOUNTER — TRANSCRIBE ORDERS (OUTPATIENT)
Dept: ADMINISTRATIVE | Facility: HOSPITAL | Age: 81
End: 2021-08-30

## 2021-08-30 ENCOUNTER — LAB (OUTPATIENT)
Dept: LAB | Facility: HOSPITAL | Age: 81
End: 2021-08-30

## 2021-08-30 DIAGNOSIS — E55.9 VITAMIN D DEFICIENCY: Primary | ICD-10-CM

## 2021-08-30 DIAGNOSIS — E55.9 VITAMIN D DEFICIENCY: ICD-10-CM

## 2021-08-30 LAB — 25(OH)D3 SERPL-MCNC: 41.5 NG/ML

## 2021-08-30 PROCEDURE — 82306 VITAMIN D 25 HYDROXY: CPT

## 2021-08-30 PROCEDURE — 36415 COLL VENOUS BLD VENIPUNCTURE: CPT

## 2021-10-29 ENCOUNTER — LAB REQUISITION (OUTPATIENT)
Dept: LAB | Facility: HOSPITAL | Age: 81
End: 2021-10-29

## 2021-10-29 DIAGNOSIS — N39.0 URINARY TRACT INFECTION, SITE NOT SPECIFIED: ICD-10-CM

## 2021-10-29 LAB
BACTERIA UR QL AUTO: ABNORMAL /HPF
BILIRUB UR QL STRIP: NEGATIVE
CLARITY UR: CLEAR
COLOR UR: YELLOW
GLUCOSE UR STRIP-MCNC: NEGATIVE MG/DL
HGB UR QL STRIP.AUTO: NEGATIVE
HYALINE CASTS UR QL AUTO: ABNORMAL /LPF
KETONES UR QL STRIP: NEGATIVE
LEUKOCYTE ESTERASE UR QL STRIP.AUTO: ABNORMAL
NITRITE UR QL STRIP: NEGATIVE
PH UR STRIP.AUTO: 6 [PH] (ref 5–8)
PROT UR QL STRIP: NEGATIVE
RBC # UR: ABNORMAL /HPF
REF LAB TEST METHOD: ABNORMAL
SP GR UR STRIP: 1.01 (ref 1–1.03)
SQUAMOUS #/AREA URNS HPF: ABNORMAL /HPF
UROBILINOGEN UR QL STRIP: ABNORMAL
WBC UR QL AUTO: ABNORMAL /HPF

## 2021-10-29 PROCEDURE — 81001 URINALYSIS AUTO W/SCOPE: CPT | Performed by: FAMILY MEDICINE

## 2021-10-29 PROCEDURE — 87086 URINE CULTURE/COLONY COUNT: CPT | Performed by: FAMILY MEDICINE

## 2021-10-30 LAB — BACTERIA SPEC AEROBE CULT: NORMAL

## 2021-11-12 ENCOUNTER — PREP FOR SURGERY (OUTPATIENT)
Dept: OTHER | Facility: HOSPITAL | Age: 81
End: 2021-11-12

## 2021-11-12 ENCOUNTER — TELEPHONE (OUTPATIENT)
Dept: GASTROENTEROLOGY | Facility: CLINIC | Age: 81
End: 2021-11-12

## 2021-11-12 ENCOUNTER — CLINICAL SUPPORT (OUTPATIENT)
Dept: GASTROENTEROLOGY | Facility: CLINIC | Age: 81
End: 2021-11-12

## 2021-11-12 DIAGNOSIS — Z12.11 COLON CANCER SCREENING: Primary | ICD-10-CM

## 2021-11-12 NOTE — PROGRESS NOTES
SPOKE WITH PT ON A DATE FOR COLONOSCOPY OF 1/27/22 . MADE SURE CHART WAS UP TO DATE. WENT OVER PREP AND MAILED OUT INSTRUCTIONS. PUT IN ORDER FOR COLON AND SENT PREP TO PHARMACY

## 2021-11-12 NOTE — TELEPHONE ENCOUNTER
Cinthia Díaz  REASON FOR CALL encounter for colon screening  SENT IN PREP suprep  Past Medical History:   Diagnosis Date   • Arthritis    • Diabetes mellitus (HCC)    • Disease of thyroid gland    • Hyperlipidemia    • Hypertension    • Pneumonia      Allergies   Allergen Reactions   • Codeine Nausea And Vomiting   • Morphine Nausea And Vomiting   • Telmisartan Other (See Comments)     ALL ARB's, causes cough    • Ace Inhibitors Cough   • Molds & Smuts Other (See Comments)     Seasonal allergies      Past Surgical History:   Procedure Laterality Date   • CHOLECYSTECTOMY     • COLONOSCOPY     • HYSTERECTOMY       Social History     Socioeconomic History   • Marital status:    Tobacco Use   • Smoking status: Never Smoker   • Smokeless tobacco: Never Used   Vaping Use   • Vaping Use: Never used   Substance and Sexual Activity   • Alcohol use: Never   • Drug use: Never   • Sexual activity: Defer     Family History   Problem Relation Age of Onset   • Stroke Mother    • Diabetes Mother    • Cancer Father    • Colon cancer Father    • Other Brother        Current Outpatient Medications:   •  albuterol sulfate HFA (Ventolin HFA) 108 (90 Base) MCG/ACT inhaler, , Disp: , Rfl:   •  aspirin (aspirin) 81 MG EC tablet, aspirin 81 mg oral tablet,delayed release (DR/EC) take 1 tablet (81 mg) by oral route once daily   Active, Disp: , Rfl:   •  atorvastatin (LIPITOR) 20 MG tablet, Take 20 mg by mouth Daily., Disp: , Rfl:   •  azelastine (ASTELIN) 0.1 % nasal spray, azelastine 137 mcg (0.1 %) nasal aerosol,spray spray 2 sprays in each nostril by intranasal route 2 times per day   Active, Disp: , Rfl:   •  doxazosin (CARDURA) 2 MG tablet, doxazosin 2 mg oral tablet take 1 tablet (2 mg) by oral route once daily   Active, Disp: , Rfl:   •  fluticasone (FLONASE) 50 MCG/ACT nasal spray, fluticasone 50 mcg/actuation nasal spray,suspension inhale 1 spray (50 mcg) in each nostril by intranasal route 2 times per day   Suspended,  Disp: , Rfl:   •  Janumet  MG per tablet, Take 1 tablet by mouth 2 (Two) Times a Day., Disp: , Rfl:   •  levocetirizine (XYZAL) 5 MG tablet, Take 5 mg by mouth Daily., Disp: , Rfl:   •  levothyroxine (SYNTHROID, LEVOTHROID) 75 MCG tablet, levothyroxine 75 mcg oral tablet take 1 tablet (75 mcg) by oral route once daily   Active, Disp: , Rfl:   •  metFORMIN (GLUCOPHAGE) 500 MG tablet, Take 500 mg by mouth 2 (Two) Times a Day., Disp: , Rfl:   •  metoprolol succinate XL (TOPROL-XL) 25 MG 24 hr tablet, Take 25 mg by mouth Daily., Disp: , Rfl:   •  oxybutynin XL (Ditropan XL) 5 MG 24 hr tablet, Ditropan XL 5 mg oral tablet extended release 24hr take 1 tablet (5 mg) by oral route once daily for 30 days 1/22/2021  Active, Disp: , Rfl:   •  SITagliptin (JANUVIA) 50 MG tablet, Take 50 mg by mouth Daily., Disp: , Rfl:   •  vitamin D (ERGOCALCIFEROL) 1.25 MG (24730 UT) capsule capsule, TAKE 1 CAPSULE BY MOUTH EVERY WEEK FOR 12 WEEKS, Disp: , Rfl:

## 2021-11-14 RX ORDER — SODIUM, POTASSIUM,MAG SULFATES 17.5-3.13G
2 SOLUTION, RECONSTITUTED, ORAL ORAL TAKE AS DIRECTED
Qty: 354 ML | Refills: 0 | Status: SHIPPED | OUTPATIENT
Start: 2021-11-14 | End: 2022-04-21

## 2021-11-15 PROBLEM — Z12.11 COLON CANCER SCREENING: Status: ACTIVE | Noted: 2021-11-15

## 2021-12-15 ENCOUNTER — LAB (OUTPATIENT)
Dept: LAB | Facility: HOSPITAL | Age: 81
End: 2021-12-15

## 2021-12-15 ENCOUNTER — TRANSCRIBE ORDERS (OUTPATIENT)
Dept: LAB | Facility: HOSPITAL | Age: 81
End: 2021-12-15

## 2021-12-15 DIAGNOSIS — R73.03 PRE-DIABETES: ICD-10-CM

## 2021-12-15 DIAGNOSIS — E11.3419: Primary | ICD-10-CM

## 2021-12-15 DIAGNOSIS — M13.0 POLYARTHROPATHY: ICD-10-CM

## 2021-12-15 DIAGNOSIS — E78.5 HYPERLIPIDEMIA, UNSPECIFIED HYPERLIPIDEMIA TYPE: ICD-10-CM

## 2021-12-15 LAB
ALBUMIN SERPL-MCNC: 4.2 G/DL (ref 3.5–5.2)
ALBUMIN/GLOB SERPL: 1.8 G/DL
ALP SERPL-CCNC: 64 U/L (ref 39–117)
ALT SERPL W P-5'-P-CCNC: 21 U/L (ref 1–33)
ANION GAP SERPL CALCULATED.3IONS-SCNC: 5.8 MMOL/L (ref 5–15)
AST SERPL-CCNC: 20 U/L (ref 1–32)
BASOPHILS # BLD AUTO: 0.03 10*3/MM3 (ref 0–0.2)
BASOPHILS NFR BLD AUTO: 0.6 % (ref 0–1.5)
BILIRUB SERPL-MCNC: 0.7 MG/DL (ref 0–1.2)
BUN SERPL-MCNC: 14 MG/DL (ref 8–23)
BUN/CREAT SERPL: 23.3 (ref 7–25)
CALCIUM SPEC-SCNC: 9.1 MG/DL (ref 8.6–10.5)
CHLORIDE SERPL-SCNC: 102 MMOL/L (ref 98–107)
CHOLEST SERPL-MCNC: 141 MG/DL (ref 0–200)
CO2 SERPL-SCNC: 28.2 MMOL/L (ref 22–29)
CREAT SERPL-MCNC: 0.6 MG/DL (ref 0.57–1)
DEPRECATED RDW RBC AUTO: 42 FL (ref 37–54)
EOSINOPHIL # BLD AUTO: 0.18 10*3/MM3 (ref 0–0.4)
EOSINOPHIL NFR BLD AUTO: 3.4 % (ref 0.3–6.2)
ERYTHROCYTE [DISTWIDTH] IN BLOOD BY AUTOMATED COUNT: 12.2 % (ref 12.3–15.4)
FOLATE SERPL-MCNC: 18.3 NG/ML (ref 4.78–24.2)
GFR SERPL CREATININE-BSD FRML MDRD: 96 ML/MIN/1.73
GLOBULIN UR ELPH-MCNC: 2.3 GM/DL
GLUCOSE SERPL-MCNC: 117 MG/DL (ref 65–99)
HBA1C MFR BLD: 6.05 % (ref 4.8–5.6)
HCT VFR BLD AUTO: 42.2 % (ref 34–46.6)
HDLC SERPL-MCNC: 51 MG/DL (ref 40–60)
HGB BLD-MCNC: 14 G/DL (ref 12–15.9)
IMM GRANULOCYTES # BLD AUTO: 0.01 10*3/MM3 (ref 0–0.05)
IMM GRANULOCYTES NFR BLD AUTO: 0.2 % (ref 0–0.5)
LDLC SERPL CALC-MCNC: 73 MG/DL (ref 0–100)
LDLC/HDLC SERPL: 1.4 {RATIO}
LYMPHOCYTES # BLD AUTO: 1.67 10*3/MM3 (ref 0.7–3.1)
LYMPHOCYTES NFR BLD AUTO: 31.5 % (ref 19.6–45.3)
MAGNESIUM SERPL-MCNC: 1.7 MG/DL (ref 1.6–2.4)
MCH RBC QN AUTO: 31.2 PG (ref 26.6–33)
MCHC RBC AUTO-ENTMCNC: 33.2 G/DL (ref 31.5–35.7)
MCV RBC AUTO: 94 FL (ref 79–97)
MONOCYTES # BLD AUTO: 0.43 10*3/MM3 (ref 0.1–0.9)
MONOCYTES NFR BLD AUTO: 8.1 % (ref 5–12)
NEUTROPHILS NFR BLD AUTO: 2.99 10*3/MM3 (ref 1.7–7)
NEUTROPHILS NFR BLD AUTO: 56.2 % (ref 42.7–76)
NRBC BLD AUTO-RTO: 0 /100 WBC (ref 0–0.2)
PLATELET # BLD AUTO: 238 10*3/MM3 (ref 140–450)
PMV BLD AUTO: 11.4 FL (ref 6–12)
POTASSIUM SERPL-SCNC: 4 MMOL/L (ref 3.5–5.2)
PROT SERPL-MCNC: 6.5 G/DL (ref 6–8.5)
RBC # BLD AUTO: 4.49 10*6/MM3 (ref 3.77–5.28)
SODIUM SERPL-SCNC: 136 MMOL/L (ref 136–145)
TRIGL SERPL-MCNC: 92 MG/DL (ref 0–150)
URATE SERPL-MCNC: 4 MG/DL (ref 2.4–5.7)
VIT B12 BLD-MCNC: 163 PG/ML (ref 211–946)
VLDLC SERPL-MCNC: 17 MG/DL (ref 5–40)
WBC NRBC COR # BLD: 5.31 10*3/MM3 (ref 3.4–10.8)

## 2021-12-15 PROCEDURE — 82607 VITAMIN B-12: CPT

## 2021-12-15 PROCEDURE — 85025 COMPLETE CBC W/AUTO DIFF WBC: CPT

## 2021-12-15 PROCEDURE — 84550 ASSAY OF BLOOD/URIC ACID: CPT

## 2021-12-15 PROCEDURE — 86038 ANTINUCLEAR ANTIBODIES: CPT

## 2021-12-15 PROCEDURE — 82746 ASSAY OF FOLIC ACID SERUM: CPT

## 2021-12-15 PROCEDURE — 36415 COLL VENOUS BLD VENIPUNCTURE: CPT

## 2021-12-15 PROCEDURE — 80061 LIPID PANEL: CPT

## 2021-12-15 PROCEDURE — 83036 HEMOGLOBIN GLYCOSYLATED A1C: CPT

## 2021-12-15 PROCEDURE — 83735 ASSAY OF MAGNESIUM: CPT

## 2021-12-15 PROCEDURE — 80053 COMPREHEN METABOLIC PANEL: CPT

## 2021-12-17 LAB — ANA TITR SER IF: NEGATIVE {TITER}

## 2022-01-20 ENCOUNTER — TELEPHONE (OUTPATIENT)
Dept: GASTROENTEROLOGY | Facility: CLINIC | Age: 82
End: 2022-01-20

## 2022-02-25 ENCOUNTER — OFFICE VISIT (OUTPATIENT)
Dept: UROLOGY | Facility: CLINIC | Age: 82
End: 2022-02-25

## 2022-02-25 VITALS
DIASTOLIC BLOOD PRESSURE: 70 MMHG | WEIGHT: 175 LBS | HEIGHT: 64 IN | SYSTOLIC BLOOD PRESSURE: 179 MMHG | BODY MASS INDEX: 29.88 KG/M2

## 2022-02-25 DIAGNOSIS — N32.81 OAB (OVERACTIVE BLADDER): ICD-10-CM

## 2022-02-25 DIAGNOSIS — R35.1 NOCTURIA: Primary | ICD-10-CM

## 2022-02-25 DIAGNOSIS — R60.0 LOWER LEG EDEMA: ICD-10-CM

## 2022-02-25 LAB
BILIRUB BLD-MCNC: NEGATIVE MG/DL
CLARITY, POC: CLEAR
COLOR UR: YELLOW
EXPIRATION DATE: ABNORMAL
GLUCOSE UR STRIP-MCNC: NEGATIVE MG/DL
KETONES UR QL: NEGATIVE
LEUKOCYTE EST, POC: NEGATIVE
Lab: ABNORMAL
NITRITE UR-MCNC: NEGATIVE MG/ML
PH UR: 5 [PH] (ref 5–8)
PROT UR STRIP-MCNC: NEGATIVE MG/DL
RBC # UR STRIP: ABNORMAL /UL
SP GR UR: 1.02 (ref 1–1.03)
SPECIMEN VOL SMN: NORMAL ML
UROBILINOGEN UR QL: NORMAL

## 2022-02-25 PROCEDURE — 99214 OFFICE O/P EST MOD 30 MIN: CPT | Performed by: UROLOGY

## 2022-02-25 PROCEDURE — 51798 US URINE CAPACITY MEASURE: CPT | Performed by: UROLOGY

## 2022-02-25 PROCEDURE — 81003 URINALYSIS AUTO W/O SCOPE: CPT | Performed by: UROLOGY

## 2022-02-25 RX ORDER — DOXAZOSIN MESYLATE 4 MG/1
4 TABLET ORAL
COMMUNITY
Start: 2022-01-04 | End: 2022-09-17

## 2022-02-25 RX ORDER — MONTELUKAST SODIUM 10 MG/1
10 TABLET ORAL DAILY
COMMUNITY
Start: 2022-02-15 | End: 2022-08-10 | Stop reason: SDUPTHER

## 2022-02-25 RX ORDER — LORATADINE 10 MG/1
10 TABLET ORAL DAILY
COMMUNITY
End: 2023-01-23

## 2022-02-25 NOTE — PROGRESS NOTES
"Chief Complaint  Establish Care (NOCTURIA)    Subjective          Cinthia Díaz presents to [facility] UROLOGY  History of Present Illness     The patient has consented to being recorded using [facility].    The patient presents today stating that her nocturia is \" terrible. \" She states that she does not drink enough water. The patient reports that she has nocturia 2 to 3 times per night. This has been an ongoing issue for a while. She states that she had a bladder lift a couple of years ago by Dr. Rocha. The patient reports that she does not think that caused it. After the bladder lift she was placed on Ditropan. Dr. Rocha became sick and was no longer in the office so when her prescription ran out, she just discontinued the medication because she could not tell any difference in her symptoms. She states that she has swelling in her legs on rare occasions. The patient reports that she has a bad knee and thinks it is bone on bone.    Objective   Vital Signs:   /70   Ht 162.6 cm (64\")   Wt 79.4 kg (175 lb)   BMI 30.04 kg/m²     Physical Exam  Vitals and nursing note reviewed.   Constitutional:       Appearance: Normal appearance. She is well-developed.   Pulmonary:      Effort: Pulmonary effort is normal.      Breath sounds: Normal air entry.   Neurological:      Mental Status: She is alert and oriented to person, place, and time.      Motor: Motor function is intact.   Psychiatric:         Mood and Affect: Mood normal.         Behavior: Behavior normal.        Result Review :   The following data was reviewed by: Jasmine Brown MD on 02/25/2022:               Results for orders placed or performed in visit on 02/25/22   Bladder Scan   Result Value Ref Range    Volume: 0ml    POC Urinalysis Dipstick, Automated    Specimen: Urine   Result Value Ref Range    Color Yellow Yellow, Straw, Dark Yellow, Florina    Clarity, UA Clear Clear    Specific Gravity  1.025 1.005 - 1.030    pH, Urine 5.0 " 5.0 - 8.0    Leukocytes Negative Negative    Nitrite, UA Negative Negative    Protein, POC Negative Negative mg/dL    Glucose, UA Negative Negative, 1000 mg/dL (3+) mg/dL    Ketones, UA Negative Negative    Urobilinogen, UA Normal Normal    Bilirubin Negative Negative    Blood, UA Trace (A) Negative    Lot Number [id number]     Expiration Date 02/28/23      Bladder Scan interpretation 02/25/2022    Estimation of residual urine via Bladder Scan  Residual Urine: 0 ml  Indication: Nocturia    OAB (overactive bladder)    Lower leg edema   Position: Supine  Examination: Incremental scanning of the suprapubic area using 2.0 MHz transducer using copious amounts of acoustic gel.   Findings: An anechoic area was demonstrated which represented the bladder, with measurement of residual urine as noted. I inspected this myself. In that the residual urine was stable or insignificant, refer to plan for treatment and plan necessary at this time.         Assessment and Plan    Diagnoses and all orders for this visit:    1. Nocturia (Primary)  -     POC Urinalysis Dipstick, Automated  -     Bladder Scan  -     Mirabegron ER (Myrbetriq) 25 MG tablet sustained-release 24 hour 24 hr tablet; Take 1 tablet by mouth Daily for 42 days.  Dispense: 42 tablet; Refill: 0    2. OAB (overactive bladder)  -     Mirabegron ER (Myrbetriq) 25 MG tablet sustained-release 24 hour 24 hr tablet; Take 1 tablet by mouth Daily for 42 days.  Dispense: 42 tablet; Refill: 0  - Samples of Myrbetriq 25 mg, once daily for 6 weeks. She will follow up in 4 to 6 weeks.   - I encouraged her to get her legs elevated to help with the nocturia.  - We also talked about a diuretic, but she will need to discuss that with Dr. Heck, her primary care doctor.    3. Lower leg edema  - Elevate legs prior to bedtime.       Follow Up   Return in about 4 weeks (around 3/25/2022) for Next scheduled follow up.  Patient was given instructions and counseling regarding her condition  or for health maintenance advice. Please see specific information pulled into the AVS if appropriate.     Transcribed from ambient dictation for Jasmine Brown MD by Saira Huff.  02/25/22   14:42 EST    Patient verbalized consent to the visit recording.

## 2022-02-25 NOTE — PATIENT INSTRUCTIONS
Overactive Bladder, Adult    Overactive bladder refers to a condition in which a person has a sudden need to pass urine. The person may leak urine if he or she cannot get to the bathroom fast enough (urinary incontinence). A person with this condition may also wake up several times in the night to go to the bathroom.  Overactive bladder is associated with poor nerve signals between your bladder and your brain. Your bladder may get the signal to empty before it is full. You may also have very sensitive muscles that make your bladder squeeze too soon. These symptoms might interfere with daily work or social activities.  What are the causes?  This condition may be associated with or caused by:  · Urinary tract infection.  · Infection of nearby tissues, such as the prostate.  · Prostate enlargement.  · Surgery on the uterus or urethra.  · Bladder stones, inflammation, or tumors.  · Drinking too much caffeine or alcohol.  · Certain medicines, especially medicines that get rid of extra fluid in the body (diuretics).  · Muscle or nerve weakness, especially from:  ? A spinal cord injury.  ? Stroke.  ? Multiple sclerosis.  ? Parkinson's disease.  · Diabetes.  · Constipation.  What increases the risk?  You may be at greater risk for overactive bladder if you:  · Are an older adult.  · Smoke.  · Are going through menopause.  · Have prostate problems.  · Have a neurological disease, such as stroke, dementia, Parkinson's disease, or multiple sclerosis (MS).  · Eat or drink things that irritate the bladder. These include alcohol, spicy food, and caffeine.  · Are overweight or obese.  What are the signs or symptoms?  Symptoms of this condition include:  · Sudden, strong urge to urinate.  · Leaking urine.  · Urinating 8 or more times a day.  · Waking up to urinate 2 or more times a night.  How is this diagnosed?  Your health care provider may suspect overactive bladder based on your symptoms. He or she will diagnose this condition  by:  · A physical exam and medical history.  · Blood or urine tests. You might need bladder or urine tests to help determine what is causing your overactive bladder.  You might also need to see a health care provider who specializes in urinary tract problems (urologist).  How is this treated?  Treatment for overactive bladder depends on the cause of your condition and whether it is mild or severe. You can also make lifestyle changes at home. Options include:  · Bladder training. This may include:  ? Learning to control the urge to urinate by following a schedule that directs you to urinate at regular intervals (timed voiding).  ? Doing Kegel exercises to strengthen your pelvic floor muscles, which support your bladder. Toning these muscles can help you control urination, even if your bladder muscles are overactive.  · Special devices. This may include:  ? Biofeedback, which uses sensors to help you become aware of your body's signals.  ? Electrical stimulation, which uses electrodes placed inside the body (implanted) or outside the body. These electrodes send gentle pulses of electricity to strengthen the nerves or muscles that control the bladder.  ? Women may use a plastic device that fits into the vagina and supports the bladder (pessary).  · Medicines.  ? Antibiotics to treat bladder infection.  ? Antispasmodics to stop the bladder from releasing urine at the wrong time.  ? Tricyclic antidepressants to relax bladder muscles.  ? Injections of botulinum toxin type A directly into the bladder tissue to relax bladder muscles.  · Lifestyle changes. This may include:  ? Weight loss. Talk to your health care provider about weight loss methods that would work best for you.  ? Diet changes. This may include reducing how much alcohol and caffeine you consume, or drinking fluids at different times of the day.  ? Not smoking. Do not use any products that contain nicotine or tobacco, such as cigarettes and e-cigarettes. If  you need help quitting, ask your health care provider.  · Surgery.  ? A device may be implanted to help manage the nerve signals that control urination.  ? An electrode may be implanted to stimulate electrical signals in the bladder.  ? A procedure may be done to change the shape of the bladder. This is done only in very severe cases.  Follow these instructions at home:  Lifestyle  · Make any diet or lifestyle changes that are recommended by your health care provider. These may include:  ? Drinking less fluid or drinking fluids at different times of the day.  ? Cutting down on caffeine or alcohol.  ? Doing Kegel exercises.  ? Losing weight if needed.  ? Eating a healthy and balanced diet to prevent constipation. This may include:  § Eating foods that are high in fiber, such as fresh fruits and vegetables, whole grains, and beans.  § Limiting foods that are high in fat and processed sugars, such as fried and sweet foods.  General instructions  · Take over-the-counter and prescription medicines only as told by your health care provider.  · If you were prescribed an antibiotic medicine, take it as told by your health care provider. Do not stop taking the antibiotic even if you start to feel better.  · Use any implants or pessary as told by your health care provider.  · If needed, wear pads to absorb urine leakage.  · Keep a journal or log to track how much and when you drink and when you feel the need to urinate. This will help your health care provider monitor your condition.  · Keep all follow-up visits as told by your health care provider. This is important.  Contact a health care provider if:  · You have a fever.  · Your symptoms do not get better with treatment.  · Your pain and discomfort get worse.  · You have more frequent urges to urinate.  Get help right away if:  · You are not able to control your bladder.  Summary  · Overactive bladder refers to a condition in which a person has a sudden need to pass  urine.  · Several conditions may lead to an overactive bladder.  · Treatment for overactive bladder depends on the cause and severity of your condition.  · Follow your health care provider's instructions about lifestyle changes, doing Kegel exercises, keeping a journal, and taking medicines.  This information is not intended to replace advice given to you by your health care provider. Make sure you discuss any questions you have with your health care provider.  Document Revised: 04/09/2020 Document Reviewed: 01/03/2019  Elsevier Patient Education © 2021 [employer]    Peripheral Edema    Peripheral edema is swelling that is caused by a buildup of fluid. Peripheral edema most often affects the lower legs, ankles, and feet. It can also develop in the arms, hands, and face. The area of the body that has peripheral edema will look swollen. It may also feel heavy or warm. Your clothes may start to feel tight. Pressing on the area may make a temporary dent in your skin. You may not be able to move your swollen arm or leg as much as usual.  There are many causes of peripheral edema. It can happen because of a complication of other conditions such as congestive heart failure, kidney disease, or a problem with your blood circulation. It also can be a side effect of certain medicines or because of an infection. It often happens to women during pregnancy. Sometimes, the cause is not known.  Follow these instructions at home:  Managing pain, stiffness, and swelling    · Raise (elevate) your legs while you are sitting or lying down.  · Move around often to prevent stiffness and to lessen swelling.  · Do not sit or stand for long periods of time.  · Wear support stockings as told by your health care provider.    Medicines  · Take over-the-counter and prescription medicines only as told by your health care provider.  · Your health care provider may prescribe medicine to help your body get rid of excess water (diuretic).  General  instructions  · Pay attention to any changes in your symptoms.  · Follow instructions from your health care provider about limiting salt (sodium) in your diet. Sometimes, eating less salt may reduce swelling.  · Moisturize skin daily to help prevent skin from cracking and draining.  · Keep all follow-up visits as told by your health care provider. This is important.  Contact a health care provider if you have:  · A fever.  · Edema that starts suddenly or is getting worse, especially if you are pregnant or have a medical condition.  · Swelling in only one leg.  · Increased swelling, redness, or pain in one or both of your legs.  · Drainage or sores at the area where you have edema.  Get help right away if you:  · Develop shortness of breath, especially when you are lying down.  · Have pain in your chest or abdomen.  · Feel weak.  · Feel faint.  Summary  · Peripheral edema is swelling that is caused by a buildup of fluid. Peripheral edema most often affects the lower legs, ankles, and feet.  · Move around often to prevent stiffness and to lessen swelling. Do not sit or stand for long periods of time.  · Pay attention to any changes in your symptoms.  · Contact a health care provider if you have edema that starts suddenly or is getting worse, especially if you are pregnant or have a medical condition.  · Get help right away if you develop shortness of breath, especially when lying down.  This information is not intended to replace advice given to you by your health care provider. Make sure you discuss any questions you have with your health care provider.  Document Revised: 09/11/2019 Document Reviewed: 09/11/2019  [employer] Patient Education © 2021 [employer]

## 2022-03-30 ENCOUNTER — OFFICE VISIT (OUTPATIENT)
Dept: UROLOGY | Facility: CLINIC | Age: 82
End: 2022-03-30

## 2022-03-30 VITALS
SYSTOLIC BLOOD PRESSURE: 153 MMHG | HEART RATE: 94 BPM | BODY MASS INDEX: 29.88 KG/M2 | HEIGHT: 64 IN | OXYGEN SATURATION: 95 % | DIASTOLIC BLOOD PRESSURE: 60 MMHG | WEIGHT: 175 LBS

## 2022-03-30 DIAGNOSIS — N32.81 OAB (OVERACTIVE BLADDER): Primary | ICD-10-CM

## 2022-03-30 LAB
BILIRUB BLD-MCNC: NEGATIVE MG/DL
CLARITY, POC: CLEAR
COLOR UR: YELLOW
EXPIRATION DATE: ABNORMAL
GLUCOSE UR STRIP-MCNC: NEGATIVE MG/DL
KETONES UR QL: NEGATIVE
LEUKOCYTE EST, POC: NEGATIVE
Lab: ABNORMAL
NITRITE UR-MCNC: NEGATIVE MG/ML
PH UR: 5.5 [PH] (ref 5–8)
PROT UR STRIP-MCNC: NEGATIVE MG/DL
RBC # UR STRIP: NEGATIVE /UL
SP GR UR: 1.02 (ref 1–1.03)
UROBILINOGEN UR QL: NORMAL

## 2022-03-30 PROCEDURE — 81003 URINALYSIS AUTO W/O SCOPE: CPT | Performed by: UROLOGY

## 2022-03-30 PROCEDURE — 99213 OFFICE O/P EST LOW 20 MIN: CPT | Performed by: UROLOGY

## 2022-03-30 PROCEDURE — 51798 US URINE CAPACITY MEASURE: CPT | Performed by: UROLOGY

## 2022-03-30 RX ORDER — VIBEGRON 75 MG/1
75 TABLET, FILM COATED ORAL DAILY
Qty: 42 TABLET | Refills: 0 | COMMUNITY
Start: 2022-03-30 | End: 2022-05-11

## 2022-03-30 NOTE — PROGRESS NOTES
"Chief Complaint  Nocturia    Subjective          Cinthia Lanette Díaz presents to [facility] UROLOGY  History of Present Illness    Miss Díaz is here for a follow-up for an overactive bladder. She was last seen on 02/25/2022. I started her on Myrbetriq and gave her samples on her last visit. We also talked about elevating her legs to help with nocturia, and she was supposed to talk to Dr. Heck about a possible diuretic. Her PVR today is 0.    The patient reports that she is doing well. She reports that the medicine did help very little. She reports that she still has nocturia. She states that she urinates the same amount within 1 or 2 hours, even after passing a lot of urine for the first time. She reports that she does pretty well in the daytime, but she has to be careful. The patient inquires if she should be on a diuretic. She states that she does not feel an urge for urination and notes that she loses her urinary control when she stands up. She would like to have samples of Gemtesa instead of having Botox prescribed.      Review of Systems    A review of systems was performed, and the pertinent positives are noted in the HPI.        Objective   Vital Signs:   /60   Pulse 94   Ht 162.6 cm (64\")   Wt 79.4 kg (175 lb)   SpO2 95%   BMI 30.04 kg/m²       Physical Exam  Vitals and nursing note reviewed.   Constitutional:       Appearance: Normal appearance. She is well-developed.   Pulmonary:      Effort: Pulmonary effort is normal.      Breath sounds: Normal air entry.   Neurological:      Mental Status: She is alert and oriented to person, place, and time.      Motor: Motor function is intact.   Psychiatric:         Mood and Affect: Mood normal.         Behavior: Behavior normal.          Result Review :   The following data was reviewed by: Jasmine Brown MD on 03/30/2022:    Results for orders placed or performed in visit on 03/30/22   POC Urinalysis Dipstick, Automated    " Specimen: Urine   Result Value Ref Range    Color Yellow Yellow, Straw, Dark Yellow, Florina    Clarity, UA Clear (A) Clear    Specific Gravity  1.025 1.005 - 1.030    pH, Urine 5.5 5.0 - 8.0    Leukocytes Negative Negative    Nitrite, UA Negative Negative    Protein, POC Negative Negative mg/dL    Glucose, UA Negative Negative, 1000 mg/dL (3+) mg/dL    Ketones, UA Negative Negative    Urobilinogen, UA Normal Normal    Bilirubin Negative Negative    Blood, UA Negative Negative    Lot Number [id number]     Expiration Date 02/28/23       Bladder Scan interpretation 03/30/2022    Estimation of residual urine via BaroFoldI 3000 creadson Bladder Scan  MA/nurse performing: Leonard Reilly LPN  Residual Urine: 0 ml  Indication: OAB (overactive bladder)   Position: Supine  Examination: Incremental scanning of the suprapubic area using 2.0 MHz transducer using copious amounts of acoustic gel.   Findings: An anechoic area was demonstrated which represented the bladder, with measurement of residual urine as noted. I inspected this myself. In that the residual urine was stable or insignificant, refer to plan for treatment and plan necessary at this time.            Assessment and Plan    Diagnoses and all orders for this visit:    1. OAB (overactive bladder) (Primary)  Comments:  She would like to try samples of Gemtesa, so I will give her about 6 weeks worth of that, and I will see her back after that.   We also discussed Botox, but she does not want to do that at this time.  Orders:  -     POC Urinalysis Dipstick, Automated  -     Vibegron (Gemtesa) 75 MG tablet; Take 1 tablet by mouth Daily for 42 days.  Dispense: 42 tablet; Refill: 0            Follow Up       No follow-ups on file.  Patient was given instructions and counseling regarding her condition or for health maintenance advice. Please see specific information pulled into the AVS if appropriate.     Transcribed from ambient dictation for Jasmine Brown MD by NITESH  .  03/30/22   15:02 EDT    Patient verbalized consent to the visit recording.  I have personally performed the services described in this document as transcribed by the above individual, and it is both accurate and complete.  Jasmine Brown MD  3/30/2022  18:48 EDT

## 2022-04-19 ENCOUNTER — TELEPHONE (OUTPATIENT)
Dept: GASTROENTEROLOGY | Facility: CLINIC | Age: 82
End: 2022-04-19

## 2022-04-21 ENCOUNTER — HOSPITAL ENCOUNTER (OUTPATIENT)
Facility: HOSPITAL | Age: 82
Setting detail: OBSERVATION
Discharge: HOME | End: 2022-04-22
Attending: EMERGENCY MEDICINE | Admitting: INTERNAL MEDICINE

## 2022-04-21 ENCOUNTER — APPOINTMENT (OUTPATIENT)
Dept: CT IMAGING | Facility: HOSPITAL | Age: 82
End: 2022-04-21

## 2022-04-21 ENCOUNTER — APPOINTMENT (OUTPATIENT)
Dept: GENERAL RADIOLOGY | Facility: HOSPITAL | Age: 82
End: 2022-04-21

## 2022-04-21 DIAGNOSIS — A41.9 SEPSIS WITHOUT ACUTE ORGAN DYSFUNCTION, DUE TO UNSPECIFIED ORGANISM: Primary | ICD-10-CM

## 2022-04-21 DIAGNOSIS — E87.20 LACTIC ACIDOSIS: ICD-10-CM

## 2022-04-21 DIAGNOSIS — J96.01 ACUTE RESPIRATORY FAILURE WITH HYPOXIA: ICD-10-CM

## 2022-04-21 DIAGNOSIS — J18.9 COMMUNITY ACQUIRED PNEUMONIA, UNSPECIFIED LATERALITY: ICD-10-CM

## 2022-04-21 LAB
ALBUMIN SERPL-MCNC: 4.5 G/DL (ref 3.5–5.2)
ALBUMIN/GLOB SERPL: 1.7 G/DL
ALP SERPL-CCNC: 81 U/L (ref 39–117)
ALT SERPL W P-5'-P-CCNC: 23 U/L (ref 1–33)
ANION GAP SERPL CALCULATED.3IONS-SCNC: 14.6 MMOL/L (ref 5–15)
AST SERPL-CCNC: 21 U/L (ref 1–32)
BASOPHILS # BLD AUTO: 0.04 10*3/MM3 (ref 0–0.2)
BASOPHILS NFR BLD AUTO: 0.3 % (ref 0–1.5)
BILIRUB SERPL-MCNC: 1.4 MG/DL (ref 0–1.2)
BILIRUB UR QL STRIP: NEGATIVE
BUN SERPL-MCNC: 14 MG/DL (ref 8–23)
BUN/CREAT SERPL: 17.1 (ref 7–25)
CALCIUM SPEC-SCNC: 9.6 MG/DL (ref 8.6–10.5)
CHLORIDE SERPL-SCNC: 101 MMOL/L (ref 98–107)
CLARITY UR: CLEAR
CO2 SERPL-SCNC: 24.4 MMOL/L (ref 22–29)
COLOR UR: YELLOW
CREAT SERPL-MCNC: 0.82 MG/DL (ref 0.57–1)
D-LACTATE SERPL-SCNC: 2 MMOL/L (ref 0.5–2)
D-LACTATE SERPL-SCNC: 3.1 MMOL/L (ref 0.5–2)
DEPRECATED RDW RBC AUTO: 43.1 FL (ref 37–54)
EGFRCR SERPLBLD CKD-EPI 2021: 72.4 ML/MIN/1.73
EOSINOPHIL # BLD AUTO: 0.02 10*3/MM3 (ref 0–0.4)
EOSINOPHIL NFR BLD AUTO: 0.2 % (ref 0.3–6.2)
ERYTHROCYTE [DISTWIDTH] IN BLOOD BY AUTOMATED COUNT: 13.2 % (ref 12.3–15.4)
FLUAV AG NPH QL: NEGATIVE
FLUBV AG NPH QL IA: NEGATIVE
GLOBULIN UR ELPH-MCNC: 2.6 GM/DL
GLUCOSE SERPL-MCNC: 180 MG/DL (ref 65–99)
GLUCOSE UR STRIP-MCNC: NEGATIVE MG/DL
HCT VFR BLD AUTO: 42.5 % (ref 34–46.6)
HGB BLD-MCNC: 14.5 G/DL (ref 12–15.9)
HGB UR QL STRIP.AUTO: NEGATIVE
HOLD SPECIMEN: NORMAL
HOLD SPECIMEN: NORMAL
IMM GRANULOCYTES # BLD AUTO: 0.07 10*3/MM3 (ref 0–0.05)
IMM GRANULOCYTES NFR BLD AUTO: 0.5 % (ref 0–0.5)
KETONES UR QL STRIP: NEGATIVE
L PNEUMO1 AG UR QL IA: NEGATIVE
LEUKOCYTE ESTERASE UR QL STRIP.AUTO: NEGATIVE
LIPASE SERPL-CCNC: 14 U/L (ref 13–60)
LYMPHOCYTES # BLD AUTO: 0.39 10*3/MM3 (ref 0.7–3.1)
LYMPHOCYTES NFR BLD AUTO: 2.9 % (ref 19.6–45.3)
MCH RBC QN AUTO: 30.7 PG (ref 26.6–33)
MCHC RBC AUTO-ENTMCNC: 34.1 G/DL (ref 31.5–35.7)
MCV RBC AUTO: 89.9 FL (ref 79–97)
MONOCYTES # BLD AUTO: 0.92 10*3/MM3 (ref 0.1–0.9)
MONOCYTES NFR BLD AUTO: 6.9 % (ref 5–12)
NEUTROPHILS NFR BLD AUTO: 11.8 10*3/MM3 (ref 1.7–7)
NEUTROPHILS NFR BLD AUTO: 89.2 % (ref 42.7–76)
NITRITE UR QL STRIP: NEGATIVE
NRBC BLD AUTO-RTO: 0 /100 WBC (ref 0–0.2)
NT-PROBNP SERPL-MCNC: 234.4 PG/ML (ref 0–1800)
PH UR STRIP.AUTO: <=5 [PH] (ref 5–8)
PLATELET # BLD AUTO: 214 10*3/MM3 (ref 140–450)
PMV BLD AUTO: 10.4 FL (ref 6–12)
POTASSIUM SERPL-SCNC: 3.7 MMOL/L (ref 3.5–5.2)
PROCALCITONIN SERPL-MCNC: 0.44 NG/ML (ref 0–0.25)
PROT SERPL-MCNC: 7.1 G/DL (ref 6–8.5)
PROT UR QL STRIP: NEGATIVE
RBC # BLD AUTO: 4.73 10*6/MM3 (ref 3.77–5.28)
S PNEUM AG SPEC QL LA: NEGATIVE
SARS-COV-2 RNA PNL SPEC NAA+PROBE: NOT DETECTED
SODIUM SERPL-SCNC: 140 MMOL/L (ref 136–145)
SP GR UR STRIP: >1.03 (ref 1–1.03)
TROPONIN T SERPL-MCNC: <0.01 NG/ML (ref 0–0.03)
UROBILINOGEN UR QL STRIP: ABNORMAL
WBC NRBC COR # BLD: 13.24 10*3/MM3 (ref 3.4–10.8)
WHOLE BLOOD HOLD SPECIMEN: NORMAL
WHOLE BLOOD HOLD SPECIMEN: NORMAL

## 2022-04-21 PROCEDURE — G0378 HOSPITAL OBSERVATION PER HR: HCPCS

## 2022-04-21 PROCEDURE — 71260 CT THORAX DX C+: CPT

## 2022-04-21 PROCEDURE — 83690 ASSAY OF LIPASE: CPT | Performed by: EMERGENCY MEDICINE

## 2022-04-21 PROCEDURE — 93010 ELECTROCARDIOGRAM REPORT: CPT | Performed by: INTERNAL MEDICINE

## 2022-04-21 PROCEDURE — 87040 BLOOD CULTURE FOR BACTERIA: CPT

## 2022-04-21 PROCEDURE — 25010000002 ONDANSETRON PER 1 MG: Performed by: EMERGENCY MEDICINE

## 2022-04-21 PROCEDURE — 80053 COMPREHEN METABOLIC PANEL: CPT

## 2022-04-21 PROCEDURE — 0 IOPAMIDOL PER 1 ML: Performed by: EMERGENCY MEDICINE

## 2022-04-21 PROCEDURE — 87899 AGENT NOS ASSAY W/OPTIC: CPT | Performed by: INTERNAL MEDICINE

## 2022-04-21 PROCEDURE — 96375 TX/PRO/DX INJ NEW DRUG ADDON: CPT

## 2022-04-21 PROCEDURE — 87040 BLOOD CULTURE FOR BACTERIA: CPT | Performed by: EMERGENCY MEDICINE

## 2022-04-21 PROCEDURE — 25010000002 AZITHROMYCIN PER 500 MG: Performed by: EMERGENCY MEDICINE

## 2022-04-21 PROCEDURE — 99285 EMERGENCY DEPT VISIT HI MDM: CPT

## 2022-04-21 PROCEDURE — 0 CEFTRIAXONE PER 250 MG: Performed by: EMERGENCY MEDICINE

## 2022-04-21 PROCEDURE — 81003 URINALYSIS AUTO W/O SCOPE: CPT | Performed by: EMERGENCY MEDICINE

## 2022-04-21 PROCEDURE — U0004 COV-19 TEST NON-CDC HGH THRU: HCPCS | Performed by: INTERNAL MEDICINE

## 2022-04-21 PROCEDURE — 96367 TX/PROPH/DG ADDL SEQ IV INF: CPT

## 2022-04-21 PROCEDURE — 96372 THER/PROPH/DIAG INJ SC/IM: CPT

## 2022-04-21 PROCEDURE — 36415 COLL VENOUS BLD VENIPUNCTURE: CPT

## 2022-04-21 PROCEDURE — 96365 THER/PROPH/DIAG IV INF INIT: CPT

## 2022-04-21 PROCEDURE — 83880 ASSAY OF NATRIURETIC PEPTIDE: CPT

## 2022-04-21 PROCEDURE — 99220 PR INITIAL OBSERVATION CARE/DAY 70 MINUTES: CPT | Performed by: INTERNAL MEDICINE

## 2022-04-21 PROCEDURE — U0005 INFEC AGEN DETEC AMPLI PROBE: HCPCS | Performed by: INTERNAL MEDICINE

## 2022-04-21 PROCEDURE — 84484 ASSAY OF TROPONIN QUANT: CPT

## 2022-04-21 PROCEDURE — C9803 HOPD COVID-19 SPEC COLLECT: HCPCS

## 2022-04-21 PROCEDURE — 83605 ASSAY OF LACTIC ACID: CPT | Performed by: EMERGENCY MEDICINE

## 2022-04-21 PROCEDURE — 87804 INFLUENZA ASSAY W/OPTIC: CPT | Performed by: EMERGENCY MEDICINE

## 2022-04-21 PROCEDURE — 84145 PROCALCITONIN (PCT): CPT | Performed by: INTERNAL MEDICINE

## 2022-04-21 PROCEDURE — 93005 ELECTROCARDIOGRAM TRACING: CPT

## 2022-04-21 PROCEDURE — 25010000002 ENOXAPARIN PER 10 MG: Performed by: INTERNAL MEDICINE

## 2022-04-21 PROCEDURE — 93005 ELECTROCARDIOGRAM TRACING: CPT | Performed by: EMERGENCY MEDICINE

## 2022-04-21 PROCEDURE — 85025 COMPLETE CBC W/AUTO DIFF WBC: CPT

## 2022-04-21 PROCEDURE — 71045 X-RAY EXAM CHEST 1 VIEW: CPT

## 2022-04-21 RX ORDER — ACETAMINOPHEN 325 MG/1
650 TABLET ORAL ONCE
Status: COMPLETED | OUTPATIENT
Start: 2022-04-21 | End: 2022-04-21

## 2022-04-21 RX ORDER — ONDANSETRON 2 MG/ML
4 INJECTION INTRAMUSCULAR; INTRAVENOUS ONCE
Status: COMPLETED | OUTPATIENT
Start: 2022-04-21 | End: 2022-04-21

## 2022-04-21 RX ORDER — AZELASTINE 1 MG/ML
2 SPRAY, METERED NASAL 2 TIMES DAILY
COMMUNITY

## 2022-04-21 RX ORDER — CEFTRIAXONE SODIUM 1 G/50ML
1 INJECTION, SOLUTION INTRAVENOUS EVERY 24 HOURS
Status: DISCONTINUED | OUTPATIENT
Start: 2022-04-22 | End: 2022-04-22 | Stop reason: HOSPADM

## 2022-04-21 RX ORDER — SODIUM CHLORIDE 0.9 % (FLUSH) 0.9 %
10 SYRINGE (ML) INJECTION AS NEEDED
Status: DISCONTINUED | OUTPATIENT
Start: 2022-04-21 | End: 2022-04-22 | Stop reason: HOSPADM

## 2022-04-21 RX ORDER — SODIUM CHLORIDE 0.9 % (FLUSH) 0.9 %
10 SYRINGE (ML) INJECTION EVERY 12 HOURS SCHEDULED
Status: DISCONTINUED | OUTPATIENT
Start: 2022-04-21 | End: 2022-04-22 | Stop reason: HOSPADM

## 2022-04-21 RX ORDER — CEFTRIAXONE SODIUM 2 G/50ML
2 INJECTION, SOLUTION INTRAVENOUS ONCE
Status: COMPLETED | OUTPATIENT
Start: 2022-04-21 | End: 2022-04-21

## 2022-04-21 RX ADMIN — AZITHROMYCIN 500 MG: 500 INJECTION, POWDER, LYOPHILIZED, FOR SOLUTION INTRAVENOUS at 15:59

## 2022-04-21 RX ADMIN — IOPAMIDOL 100 ML: 755 INJECTION, SOLUTION INTRAVENOUS at 16:51

## 2022-04-21 RX ADMIN — SODIUM CHLORIDE 1000 ML: 9 INJECTION, SOLUTION INTRAVENOUS at 16:00

## 2022-04-21 RX ADMIN — SODIUM CHLORIDE 1000 ML: 9 INJECTION, SOLUTION INTRAVENOUS at 13:14

## 2022-04-21 RX ADMIN — ACETAMINOPHEN 650 MG: 325 TABLET ORAL at 13:15

## 2022-04-21 RX ADMIN — CEFTRIAXONE SODIUM 2 G: 2 INJECTION, SOLUTION INTRAVENOUS at 14:17

## 2022-04-21 RX ADMIN — SODIUM CHLORIDE 500 ML: 9 INJECTION, SOLUTION INTRAVENOUS at 17:30

## 2022-04-21 RX ADMIN — ONDANSETRON 4 MG: 2 INJECTION INTRAMUSCULAR; INTRAVENOUS at 13:15

## 2022-04-21 RX ADMIN — Medication 10 ML: at 20:12

## 2022-04-21 RX ADMIN — ENOXAPARIN SODIUM 40 MG: 100 INJECTION SUBCUTANEOUS at 20:12

## 2022-04-21 NOTE — H&P
[facility] HISTORY AND PHYSICAL  Date: 2022   Patient Name: Cinthia Díaz  : 1940  MRN: [id number]  Primary Care Physician:  Yasmin Heck MD  Date of admission: 2022    Subjective   Subjective     Chief Complaint: Congestion and not feeling well    HPI:    Cinthia Díaz is a 81 y.o. female hip past medical history of osteoarthritis, type 2 diabetes, hypothyroidism, dyslipidemia, hypertension, and recurrent bronchitis followed by pulmonology who presents with congestion, fever, and not feeling well    History is obtained from the patient's and the patient's daughter who is at bedside.  Patient states that she started not feeling well on Tuesday.  States that at some point she had some vomiting.  She is also felt not like herself or count of outside of her body is how she describes it.  Patient's had coughing and congestion.  He is felt short of breath at times.  Never had any chest pain.  He does have a history of recurrent bronchitis but no history of smoking and no history of COPD.  As result of the symptoms the patient can the emergency department further evaluation.      Patient's temperature is 99.4, pulse is 110-130, respirate of 28, blood pressure 94/46, was satting 88% on room air was placed on 2 L of oxygen the emergency department.  CBC shows a white blood cell count of 13,000.  CMP is normal except for glucose of 180.  Lactate is 3.1.  Influenza was negative.  Chest x-ray shows no abnormalities.  Blood cultures were sent.  There is concern for sepsis with unknown etiology the patient was given ceftriaxone and azithromycin.  CR to send procalcitonin get a chest x-ray to make sure that the patient does not have pneumonia.  Should be admitted to hospital for acute hypoxemic respiratory failure and also concern of sepsis with unknown etiology.      All systems reviewed abnormalities noted above    Personal History     Past Medical  History:    Osteoarthritis  Type 2 diabetes  Hypothyroidism  Dyslipidemia  Hypertension  Recurrent bronchitis    Past Surgical History:  Bladder surgery  Colonoscopy  Cystectomy  Hernia repair  Hysterectomy    Family History:   Colon cancer in father diabetes in mother    Social History:   Never smoker.  Never alcohol.    Home Medications:  Vibegron, albuterol sulfate HFA, aspirin, atorvastatin, doxazosin, fluticasone, levothyroxine, loratadine, metFORMIN, metoprolol succinate XL, montelukast, sodium-potassium-magnesium sulfates, and vitamin D    Allergies:  Allergies   Allergen Reactions   • Codeine Nausea And Vomiting   • Morphine Nausea And Vomiting   • Telmisartan Other (See Comments)     ALL ARB's, causes cough    • Ace Inhibitors Cough   • Molds & Smuts Other (See Comments)     Seasonal allergies          Objective   Objective     Vitals:   Temp:  [99.4 °F (37.4 °C)] 99.4 °F (37.4 °C)  Heart Rate:  [109-131] 109  Resp:  [28] 28  BP: ()/() 94/46  Flow (L/min):  [2] 2    Physical Exam    Constitutional: Awake, alert, no acute distress   Eyes: Pupils equal, sclerae anicteric, no conjunctival injection   HENT: NCAT, mucous membranes moist   Neck: Supple, no thyromegaly, no lymphadenopathy, trachea midline   Respiratory: Coarse breath sounds with coughing when she takes a deep breath   Cardiovascular: Tachycardia, no murmurs, rubs, or gallops, palpable pedal pulses bilaterally   Gastrointestinal: Positive bowel sounds, soft, nontender, nondistended   Musculoskeletal: No bilateral ankle edema, no clubbing or cyanosis to extremities   Psychiatric: Appropriate affect, cooperative   Neurologic: Oriented x 3, strength symmetric in all extremities, Cranial Nerves grossly intact to confrontation, speech clear   Skin: No rashes     Result Review    Result Review:  I have personally reviewed the results from the time of this admission to 4/21/2022 15:41 EDT and agree with these findings:  White blood cell count  of 13  Lactate of 3.1    Assessment/Plan   Assessment / Plan     Assessment/Plan:   Sepsis with unknown etiology  --Procalcitonin was 0.44 so concern for pneumonia as the cause of her sepsis even though normal CXR  --Urinalysis is still pending  --Blood cultures  COVID-19 rule out  Acute hypoxemic respiratory failure on 2 L  Elevated lactate  Hypoxemic respiratory failure  --Oxygen was the satting below 88% while talking per her daughter at bedside  Hypothyroidism  Type 2 diabetes  Vitamin D deficiency    Plan:  -- Admit to telemetry tachycardia  -- Continue ceftriaxone/azithro   -- Send CTA to rule out pulm embolism and pneumonia (Normal portable CXR and elevated procal with symptoms consistent PNA)  -- Follow-up respiratory culture and blood culture  -- Legionella and strep urine antigen  -- COVID-19 rule out  -- Repeat lactic acid  -- Patient does admit not feeling well and feeling tight chest we will repeat a troponin in the morning although low suspicion for ischemia  -- Send urinalysis  -- Continue oxygen for saturations less than 90%      DVT prophylaxis:  Lovenox    CODE STATUS:     Full code    Admission Status:  I believe this patient meets observation status.    Electronically signed by Jose Raul Velez MD, 04/21/22, 3:41 PM EDT.

## 2022-04-21 NOTE — ED PROVIDER NOTES
Subjective   Cinthia Díaz is a 81 y.o. female that presents to the ED via private vehicle accompanied by family with complaints of moderate dry COUGH and CONGESTION for the past two days. Symptoms are still present when seen in the ED and unchanged. Nothing improves or worsens the symptoms. Pt did call her allergist when symptoms first started and was told to use a nasal spray, which has not helped. She adds that she also took a COVID-19 test which was negative.     Pt reports fatigue, body aches, SOB (starting last night), and generalized weakness. She denies diarrhea and CP. She has nausea and emesis this morning that looked like yellow bile. No hematemesis. She reports no fever at home.     No hx of CHF or COPD.     Pulmonologist - Kaini       History provided by:  Patient      Review of Systems   Constitutional: Positive for fatigue. Negative for chills, diaphoresis and fever.   HENT: Positive for congestion. Negative for ear discharge and nosebleeds.    Eyes: Negative for photophobia.   Respiratory: Positive for cough and shortness of breath.    Cardiovascular: Negative for chest pain.   Gastrointestinal: Positive for nausea and vomiting. Negative for diarrhea.   Genitourinary: Negative for dysuria.   Musculoskeletal: Positive for myalgias. Negative for back pain and neck pain.   Skin: Negative for rash.   Neurological: Positive for weakness (generalized). Negative for headaches.   All other systems reviewed and are negative.      Past Medical History:   Diagnosis Date   • Arthritis    • Diabetes mellitus (HCC)    • Disease of thyroid gland    • Hyperlipidemia    • Hypertension    • Pneumonia    • Seasonal allergies        Allergies   Allergen Reactions   • Codeine Nausea And Vomiting   • Morphine Nausea And Vomiting   • Telmisartan Other (See Comments)     ALL ARB's, causes cough    • Ace Inhibitors Cough   • Molds & Smuts Other (See Comments)     Seasonal allergies        Past Surgical History:    Procedure Laterality Date   • BLADDER SURGERY     • CHOLECYSTECTOMY     • COLONOSCOPY     • HERNIA REPAIR     • HYSTERECTOMY         Family History   Problem Relation Age of Onset   • Stroke Mother    • Diabetes Mother    • Cancer Father    • Colon cancer Father    • Other Brother        Social History     Socioeconomic History   • Marital status:    Tobacco Use   • Smoking status: Never Smoker   • Smokeless tobacco: Never Used   Vaping Use   • Vaping Use: Never used   Substance and Sexual Activity   • Alcohol use: Never   • Drug use: Never   • Sexual activity: Defer         Objective   Physical Exam  Vitals and nursing note reviewed.   Constitutional:       General: She is not in acute distress.  HENT:      Head: Normocephalic and atraumatic.      Right Ear: Tympanic membrane normal.      Nose: Nose normal.      Mouth/Throat:      Mouth: Mucous membranes are moist.   Eyes:      Extraocular Movements: Extraocular movements intact.      Pupils: Pupils are equal, round, and reactive to light.   Neck:      Comments: No meningismus or lymphadenopathy.   Cardiovascular:      Rate and Rhythm: Regular rhythm. Tachycardia present.      Pulses:           Radial pulses are 2+ on the right side and 2+ on the left side.      Heart sounds: Normal heart sounds. No murmur heard.    No friction rub.   Pulmonary:      Effort: Pulmonary effort is normal. No respiratory distress.      Breath sounds: Rhonchi (mild; right lung) present. No wheezing.   Abdominal:      General: There is no distension.      Palpations: Abdomen is soft.      Tenderness: There is no abdominal tenderness. There is no guarding or rebound.   Musculoskeletal:         General: Normal range of motion.      Cervical back: Normal range of motion and neck supple.      Right lower leg: No edema.      Left lower leg: No edema.   Skin:     General: Skin is warm and dry.      Findings: No rash.   Neurological:      Mental Status: She is alert and oriented to  person, place, and time.      Sensory: No sensory deficit.      Motor: No weakness.   Psychiatric:         Mood and Affect: Mood normal.      Comments: Mood appears stable, no psychosis.         Procedures         ED Course  ED Course as of 04/21/22 2234   Thu Apr 21, 2022   1300 EKG: Sinus tach 128. Bi-atrial  enlargement. Normal QRS. Mild ST depressions in leads V3. V4, V5, V6, I, and aVL. Normal T waves. No previous for comparison. Interpreted by me.  [KJ]      ED Course User Index  [KJ] Shavonne Ding                                            [facility]      This patient is a pleasant 81-year-old female who presents with symptoms of cough and congestion and also low oxygen level at 88% on room air, and some nausea and vomiting and diarrhea lately.    She was noted to have a initial heart rate of 131 and a low-grade fever here and also elevated lactic acid.    Given these findings she was given some IV fluids here and given a dose of IV broad-spectrum antibiotics as she met sepsis criteria.    Flu swab was negative and COVID-19 swab negative.    Chest x-ray negative for infiltrate but she has a history of recurrent pneumonia and presenting similarly so I will treat her for this and, given her signs of early sepsis and acute hypoxic respiratory failure requiring oxygen, I will admit her to the medicine service.    Final diagnoses:   Sepsis without acute organ dysfunction, due to unspecified organism (HCC)   Community acquired pneumonia, unspecified laterality   Acute respiratory failure with hypoxia (HCC)   Lactic acidosis       Documentation assistance provided by wes Ding.  Information recorded by the scribe was done at my direction and has been verified and validated by me.     Shavonne Ding  04/21/22 1327       Chico Larsen MD  04/21/22 2231

## 2022-04-22 VITALS
BODY MASS INDEX: 31.16 KG/M2 | TEMPERATURE: 99.1 F | HEART RATE: 88 BPM | DIASTOLIC BLOOD PRESSURE: 57 MMHG | RESPIRATION RATE: 16 BRPM | HEIGHT: 64 IN | SYSTOLIC BLOOD PRESSURE: 136 MMHG | OXYGEN SATURATION: 96 % | WEIGHT: 182.54 LBS

## 2022-04-22 LAB
ALBUMIN SERPL-MCNC: 3.4 G/DL (ref 3.5–5.2)
ALBUMIN/GLOB SERPL: 1.9 G/DL
ALP SERPL-CCNC: 60 U/L (ref 39–117)
ALT SERPL W P-5'-P-CCNC: 14 U/L (ref 1–33)
ANION GAP SERPL CALCULATED.3IONS-SCNC: 9.7 MMOL/L (ref 5–15)
AST SERPL-CCNC: 14 U/L (ref 1–32)
BASOPHILS # BLD AUTO: 0.03 10*3/MM3 (ref 0–0.2)
BASOPHILS NFR BLD AUTO: 0.3 % (ref 0–1.5)
BILIRUB SERPL-MCNC: 0.9 MG/DL (ref 0–1.2)
BUN SERPL-MCNC: 16 MG/DL (ref 8–23)
BUN/CREAT SERPL: 22.5 (ref 7–25)
CALCIUM SPEC-SCNC: 8.2 MG/DL (ref 8.6–10.5)
CHLORIDE SERPL-SCNC: 107 MMOL/L (ref 98–107)
CO2 SERPL-SCNC: 25.3 MMOL/L (ref 22–29)
CREAT SERPL-MCNC: 0.71 MG/DL (ref 0.57–1)
DEPRECATED RDW RBC AUTO: 45.3 FL (ref 37–54)
EGFRCR SERPLBLD CKD-EPI 2021: 86.1 ML/MIN/1.73
EOSINOPHIL # BLD AUTO: 0.13 10*3/MM3 (ref 0–0.4)
EOSINOPHIL NFR BLD AUTO: 1.1 % (ref 0.3–6.2)
ERYTHROCYTE [DISTWIDTH] IN BLOOD BY AUTOMATED COUNT: 13.4 % (ref 12.3–15.4)
GLOBULIN UR ELPH-MCNC: 1.8 GM/DL
GLUCOSE SERPL-MCNC: 113 MG/DL (ref 65–99)
HCT VFR BLD AUTO: 35.5 % (ref 34–46.6)
HGB BLD-MCNC: 11.6 G/DL (ref 12–15.9)
IMM GRANULOCYTES # BLD AUTO: 0.04 10*3/MM3 (ref 0–0.05)
IMM GRANULOCYTES NFR BLD AUTO: 0.3 % (ref 0–0.5)
LYMPHOCYTES # BLD AUTO: 1.72 10*3/MM3 (ref 0.7–3.1)
LYMPHOCYTES NFR BLD AUTO: 14.9 % (ref 19.6–45.3)
MAGNESIUM SERPL-MCNC: 1.6 MG/DL (ref 1.6–2.4)
MCH RBC QN AUTO: 30.1 PG (ref 26.6–33)
MCHC RBC AUTO-ENTMCNC: 32.7 G/DL (ref 31.5–35.7)
MCV RBC AUTO: 92 FL (ref 79–97)
MONOCYTES # BLD AUTO: 0.97 10*3/MM3 (ref 0.1–0.9)
MONOCYTES NFR BLD AUTO: 8.4 % (ref 5–12)
NEUTROPHILS NFR BLD AUTO: 75 % (ref 42.7–76)
NEUTROPHILS NFR BLD AUTO: 8.64 10*3/MM3 (ref 1.7–7)
NRBC BLD AUTO-RTO: 0 /100 WBC (ref 0–0.2)
PLATELET # BLD AUTO: 191 10*3/MM3 (ref 140–450)
PMV BLD AUTO: 10.9 FL (ref 6–12)
POTASSIUM SERPL-SCNC: 3.6 MMOL/L (ref 3.5–5.2)
PROCALCITONIN SERPL-MCNC: 1.35 NG/ML (ref 0–0.25)
PROT SERPL-MCNC: 5.2 G/DL (ref 6–8.5)
RBC # BLD AUTO: 3.86 10*6/MM3 (ref 3.77–5.28)
SODIUM SERPL-SCNC: 142 MMOL/L (ref 136–145)
TROPONIN T SERPL-MCNC: <0.01 NG/ML (ref 0–0.03)
TSH SERPL DL<=0.05 MIU/L-ACNC: 1.3 UIU/ML (ref 0.27–4.2)
WBC NRBC COR # BLD: 11.53 10*3/MM3 (ref 3.4–10.8)

## 2022-04-22 PROCEDURE — 99217 PR OBSERVATION CARE DISCHARGE MANAGEMENT: CPT | Performed by: INTERNAL MEDICINE

## 2022-04-22 PROCEDURE — 25010000002 AZITHROMYCIN PER 500 MG: Performed by: INTERNAL MEDICINE

## 2022-04-22 PROCEDURE — 84484 ASSAY OF TROPONIN QUANT: CPT | Performed by: INTERNAL MEDICINE

## 2022-04-22 PROCEDURE — 94640 AIRWAY INHALATION TREATMENT: CPT

## 2022-04-22 PROCEDURE — 25010000002 CEFTRIAXONE PER 250 MG: Performed by: INTERNAL MEDICINE

## 2022-04-22 PROCEDURE — 80053 COMPREHEN METABOLIC PANEL: CPT | Performed by: INTERNAL MEDICINE

## 2022-04-22 PROCEDURE — 94799 UNLISTED PULMONARY SVC/PX: CPT

## 2022-04-22 PROCEDURE — 85025 COMPLETE CBC W/AUTO DIFF WBC: CPT | Performed by: INTERNAL MEDICINE

## 2022-04-22 PROCEDURE — 84443 ASSAY THYROID STIM HORMONE: CPT | Performed by: INTERNAL MEDICINE

## 2022-04-22 PROCEDURE — 99220 PR INITIAL OBSERVATION CARE/DAY 70 MINUTES: CPT | Performed by: INTERNAL MEDICINE

## 2022-04-22 PROCEDURE — G0378 HOSPITAL OBSERVATION PER HR: HCPCS

## 2022-04-22 PROCEDURE — 83735 ASSAY OF MAGNESIUM: CPT | Performed by: INTERNAL MEDICINE

## 2022-04-22 PROCEDURE — 84145 PROCALCITONIN (PCT): CPT | Performed by: INTERNAL MEDICINE

## 2022-04-22 RX ORDER — BUDESONIDE 0.5 MG/2ML
0.5 INHALANT ORAL
Status: DISCONTINUED | OUTPATIENT
Start: 2022-04-22 | End: 2022-04-22 | Stop reason: HOSPADM

## 2022-04-22 RX ORDER — ARFORMOTEROL TARTRATE 15 UG/2ML
15 SOLUTION RESPIRATORY (INHALATION)
Status: DISCONTINUED | OUTPATIENT
Start: 2022-04-22 | End: 2022-04-22 | Stop reason: HOSPADM

## 2022-04-22 RX ORDER — AMOXICILLIN AND CLAVULANATE POTASSIUM 875; 125 MG/1; MG/1
1 TABLET, FILM COATED ORAL 2 TIMES DAILY
Qty: 14 TABLET | Refills: 0 | Status: SHIPPED | OUTPATIENT
Start: 2022-04-22 | End: 2022-04-29

## 2022-04-22 RX ORDER — ACETAMINOPHEN 325 MG/1
650 TABLET ORAL EVERY 4 HOURS PRN
Status: DISCONTINUED | OUTPATIENT
Start: 2022-04-22 | End: 2022-04-22 | Stop reason: HOSPADM

## 2022-04-22 RX ADMIN — BUDESONIDE 0.5 MG: 0.5 SUSPENSION RESPIRATORY (INHALATION) at 09:17

## 2022-04-22 RX ADMIN — AZITHROMYCIN 500 MG: 500 INJECTION, POWDER, LYOPHILIZED, FOR SOLUTION INTRAVENOUS at 08:52

## 2022-04-22 RX ADMIN — Medication 10 ML: at 08:01

## 2022-04-22 RX ADMIN — ARFORMOTEROL TARTRATE 15 MCG: 15 SOLUTION RESPIRATORY (INHALATION) at 09:16

## 2022-04-22 RX ADMIN — CEFTRIAXONE SODIUM 1 G: 1 INJECTION, SOLUTION INTRAVENOUS at 08:01

## 2022-04-22 NOTE — CONSULTS
Pulmonary / Critical Care Consult Note      Patient Name: Cinthia Díaz  : 1940  MRN: [id number]  Primary Care Physician:  Yasmin Heck MD  Referring Physician: Alexander Allen DO  Date of admission: 2022    Subjective   Subjective     Reason for Consult/ Chief Complaint: pneumonia.    HPI:  Cinthia Díaz is a 81 y.o. female with past medical history for recurrent bronchitis, allergies requiring shots weekly, hypertension, and diabetes presented to the ED with complaints of congestion, worsening dyspnea, nausea, vomiting, and diarrhea over the last 3 days.  In the ED, she was noted to be tachycardic at 131 and T-max 99.4.  BC elevated at 11.53 and Pro-Jose 1.35.  Chest x-ray was unremarkable.  CT of chest obtained and revealed patchy groundglass opacities bibasilar.  Because of the above our services was consulted for further evaluation and treatment.  Upon exam, patient is lying in bed on room air in no acute distress.  She states her symptoms have been ongoing for 3 days.  Denies any fever, chills, sputum production, chest pain, or hemoptysis.  She states she called PCP and allergist but were unable to get in for an appointment so she came to the ED.  States she is feeling better since yesterday.  Denies any smoking.  She is up-to-date on vaccines including flu, pneumonia, and COVID.  States she has had pneumonia 1 time 2 years ago but was not hospitalized for it.  Denies any prior lung history.     Review of Systems  Constitutional symptoms:   Fatigue, otherwise denied complaints   Ear, nose, throat: Congestion, otherwise denied complaints  Cardiovascular:  Denied complaints  Respiratory: Dyspnea, coarse nonproductive cough, otherwise denied complaints  Gastrointestinal: Denied complaints  Musculoskeletal: Denied complaints  Genitourinary: Denied complaints  Allergy / Immunology: Obtains weekly allergy shots, otherwise denied complaints  Hematologic: Denied complaints  Neurologic:  Denied complaints  Skin: Denied complaints  Endocrine: Denied complaints  Psychiatric: Denied complaints    Personal History     Past Medical History:   Diagnosis Date   • Arthritis    • Diabetes mellitus (HCC)    • Disease of thyroid gland    • Hyperlipidemia    • Hypertension    • Pneumonia    • Seasonal allergies        Past Surgical History:   Procedure Laterality Date   • BLADDER SURGERY     • CHOLECYSTECTOMY     • COLONOSCOPY     • HERNIA REPAIR     • HYSTERECTOMY         Family History: family history includes Cancer in her father; Colon cancer in her father; Diabetes in her mother; Other in her brother; Stroke in her mother.  Denies any family history of lung disease or lung cancer.    Social History:  reports that she has never smoked. She has never used smokeless tobacco. She reports that she does not drink alcohol and does not use drugs.  She is a lifetime non-smoker.    Home Medications:  Vibegron, albuterol sulfate HFA, aspirin, atorvastatin, azelastine, doxazosin, fluticasone, levothyroxine, loratadine, metFORMIN, metoprolol succinate XL, montelukast, and vitamin D    Allergies:  Allergies   Allergen Reactions   • Codeine Nausea And Vomiting   • Morphine Nausea And Vomiting   • Telmisartan Other (See Comments)     ALL ARB's, causes cough    • Ace Inhibitors Cough   • Molds & Smuts Other (See Comments)     Seasonal allergies        Objective    Objective     Vitals:   Temp:  [97.8 °F (36.6 °C)-99.5 °F (37.5 °C)] 99.1 °F (37.3 °C)  Heart Rate:  [] 88  Resp:  [16-20] 16  BP: ()/() 136/57  Flow (L/min):  [2] 2    Physical Exam:  Vital Signs Reviewed   General:  WDWN female, awake and alert, NAD on room air   HEENT:  PERRL, EOMI.  OP, nares clear, no sinus tenderness  Neck:  Supple, no JVD, no thyromegaly  Lymph: no axillary, cervical, supraclavicular lymphadenopathy noted bilaterally  Chest:  good aeration, bibasilar crackles left greater than right, tympanic to percussion bilaterally, no  work of breathing noted  CV: , no MGR, pulses 2+, equal  Abd:  Soft, NT, ND, + BS, no HSM  EXT:  no clubbing, no cyanosis, no edema, no joint tenderness  Neuro:  A&Ox3, CN grossly intact, no focal deficits  Skin: No rashes or lesions noted     Result Review    Result Review:  I have personally reviewed the results from the time of this admission to 4/22/2022 11:57 EDT and agree with these findings:  [x]  Laboratory  [x]  Microbiology  [x]  Radiology  [x]  EKG/Telemetry   []  Cardiology/Vascular   []  Pathology  [x]  Old records  []  Other:  Most notable findings include: WBC 11.53, Pro-Jose 1.35, lactate 2.0  Magnesium 1.6, potassium 3.6, creatinine 0.71    4/21 COVID negative  Flu negative  Strep/Legionella negative  Blood cultures pending    4/21 CXR unremarkable    4/21 Ct chest with peribronchial groundglass opacities left greater than right.    Assessment/Plan   Assessment / Plan     Active Hospital Problems:  Active Hospital Problems    Diagnosis    • Sepsis without acute organ dysfunction, due to unspecified organism (HCC)      Impression:  Multifocal community-acquired pneumonia of unspecified organism  Sepsis secondary to above resolved  HTN  Hypothyroidism  DM 2 with hyperglycemia  Obesity BMI 31.3  Seasonal allergies well-controlled    Plan:  CT of the ches personally reviewed.  Findings consistent with community-acquired pneumonia.  Recommend repeat noncontrast chest CT in 3 months to confirm resolution  On room air.  Flu and COVID-negative.  Strep and Legionella negative.  Pending blood cultures.  Try to obtain sputum culture.  1.35.  Continue ceftriaxone and azithromycin.  Would recommend transitioning to Augmentin twice daily for 7 days prior to discharge.  Encourage patient to take yogurt and probiotics while on Augmentin as outpatient    Continue Brovana, Pulmicort, and duo nebs.  Start on bronchopulmonary hygiene.  Encourage I-S and flutter valve.  Encourage activity.  Up to chair as  tolerated.     Patient asking to go home.  From pulmonary standpoint patient is stable to discharge home.  Recommend completing 7 days of Augmentin.    DVT prophylaxis:  Medical DVT prophylaxis orders are present.     Code Status and Medical Interventions:   Ordered at: 04/21/22 1610     Level Of Support Discussed With:    Patient     Code Status (Patient has no pulse and is not breathing):    CPR (Attempt to Resuscitate)     Medical Interventions (Patient has pulse or is breathing):    Full Support        Labs, microbiology, radiology, medications, and provider notes personally reviewed.  Discussed with primary services and bedside RN.    Thank you for this consult and allowing me to participate in the care of Ms. Díaz.    Neeru BENJAMIN NP-C , am scribing for Dr. Devries on 4/22/22.    Electronically signed by Rickey Devries MD, 04/22/22, 11:58 AM EDT.

## 2022-04-22 NOTE — DISCHARGE SUMMARY
[facility]         HOSPITALIST  DISCHARGE SUMMARY    Patient Name: Cinthia Díaz  : 1940  MRN: [id number]    Date of Admission: 2022  Date of Discharge:  2022    Primary Care Physician: Yasmin Heck MD    Consults     Date and Time Order Name Status Description    2022  7:43 AM Inpatient Pulmonology Consult Completed     2022  3:08 PM Hospitalist (on-call MD unless specified)            Active and Resolved Hospital Problems:  Active Hospital Problems    Diagnosis POA   • Sepsis without acute organ dysfunction, due to unspecified organism (HCC) [A41.9] Yes      Resolved Hospital Problems   No resolved problems to display.       Hospital Course     Hospital Course:  Cinthia Díaz is a 81 y.o. female with past medical history of osteoarthritis, type 2 diabetes, hypothyroidism, dyslipidemia, hypertension, and recurrent bronchitis followed by pulmonology who presented  with congestion, fever, and not feeling well.  Patient states that she started not feeling well on Tuesday.  States that at some point she had some vomiting.  Patient's had coughing and congestion.  He is felt short of breath at times.  Never had any chest pain.  He does have a history of recurrent bronchitis but no history of smoking and no history of COPD.  As result of the symptoms the patient can the emergency department further evaluation.  In ER Patient's temperature was 99.4, pulse is 110-130, respirate of 28, blood pressure 94/46, was satting 88% on room air was placed on 2 L of oxygen the emergency department.  CBC shows a white blood cell count of 13,000.  CMP is normal except for glucose of 180.  Lactate is 3.1.  Influenza was negative. Covid was negative. Chest x-ray shows no abnormalities.  Blood cultures were sent.  There is concern for sepsis with unknown etiology the patient was given ceftriaxone and azithromycin.   Patient's procalcitonin was elevated at 1.   Patient's  urinary antigens were negative. Patient had a CT of chest showing Peribronchial opacities most compatible with pneumonia greater on the left than the right.  This morning patient states she feels significantly better she is on room air no longer requiring oxygen.  Patient's pulmonologist was consulted.  Pulmonary has recommended to transition over to Augmentin to complete course of antibiotics.  Patient continue her other home medication.  Patient should have a repeat CT of the chest in 3 months to confirm resolution.  At this time patient is adamant to be discharged home since she feels significantly better.  Will discharge patient home in stable condition.  Patient will follow-up with her primary care in 1 week and will follow-up with pulmonary in the next 3 months.           Day of Discharge     Vital Signs:  Temp:  [97.8 °F (36.6 °C)-99.5 °F (37.5 °C)] 99.1 °F (37.3 °C)  Heart Rate:  [] 88  Resp:  [16-20] 16  BP: ()/(46-76) 136/57  Flow (L/min):  [2] 2  Physical Exam:    Constitutional: Awake, alert, no acute distress              Eyes: Pupils equal, sclerae anicteric, no conjunctival injection              HENT: NCAT, mucous membranes moist              Neck: Supple,              Respiratory: Clear. No wheezing or rhonchi               Cardiovascular: RRR no murmurs,              Gastrointestinal: Positive bowel sounds, soft, nontender, nondistended              Musculoskeletal: No bilateral ankle edema, no clubbing or cyanosis to extremities              Psychiatric: Appropriate affect, cooperative              Neurologic: Oriented x 3, strength symmetric in all extremities, Cranial Nerves grossly intact to confrontation, speech clear              Skin: No rashes       Discharge Details        Discharge Medications      New Medications      Instructions Start Date   amoxicillin-clavulanate 875-125 MG per tablet  Commonly known as: Augmentin   1 tablet, Oral, 2 Times Daily         Continue These  Medications      Instructions Start Date   albuterol sulfate  (90 Base) MCG/ACT inhaler  Commonly known as: PROVENTIL HFA;VENTOLIN HFA;PROAIR HFA   1 puff, Inhalation, Every 4 Hours PRN      aspirin 81 MG EC tablet   81 mg, Oral, Daily      atorvastatin 20 MG tablet  Commonly known as: LIPITOR   20 mg, Oral, Daily      azelastine 0.1 % nasal spray  Commonly known as: ASTELIN   2 sprays, Nasal, 2 Times Daily, Use in each nostril as directed      doxazosin 4 MG tablet  Commonly known as: CARDURA   4 mg, Oral, Every Night at Bedtime      fluticasone 50 MCG/ACT nasal spray  Commonly known as: FLONASE   1 spray, Nasal, Daily      Gemtesa 75 MG tablet  Generic drug: Vibegron   75 mg, Oral, Daily      levothyroxine 75 MCG tablet  Commonly known as: SYNTHROID, LEVOTHROID   75 mcg, Oral, Daily      loratadine 10 MG tablet  Commonly known as: CLARITIN   10 mg, Oral, Daily      metFORMIN 500 MG tablet  Commonly known as: GLUCOPHAGE   500 mg, Oral, 2 Times Daily      metoprolol succinate XL 25 MG 24 hr tablet  Commonly known as: TOPROL-XL   25 mg, Oral, Daily      montelukast 10 MG tablet  Commonly known as: SINGULAIR   10 mg, Oral, Daily      vitamin D 1.25 MG (21043 UT) capsule capsule  Commonly known as: ERGOCALCIFEROL   1,000 Units, Oral, 2 Times Daily             Allergies   Allergen Reactions   • Codeine Nausea And Vomiting   • Morphine Nausea And Vomiting   • Telmisartan Other (See Comments)     ALL ARB's, causes cough    • Ace Inhibitors Cough   • Molds & Smuts Other (See Comments)     Seasonal allergies        Discharge Disposition:       Diet:  Hospital:  Diet Order   Procedures   • Diet Regular; Consistent Carbohydrate       Discharge Activity:  As tolerated       CODE STATUS:  Code Status and Medical Interventions:   Ordered at: 04/21/22 1610     Level Of Support Discussed With:    Patient     Code Status (Patient has no pulse and is not breathing):    CPR (Attempt to Resuscitate)     Medical Interventions  (Patient has pulse or is breathing):    Full Support         Future Appointments   Date Time Provider Department Center   5/24/2022  2:00 PM Carlos Manuel Cleaning [place] [facility]   8/10/2022  1:45 PM Arnulfo Villaseñor MD [facility]       Additional Instructions for the Follow-ups that You Need to Schedule     Discharge Follow-up with PCP   As directed       Currently Documented PCP:    Yasmin Heck MD    PCP Phone Number:    [phone]     Follow Up Details: in 1 week         Discharge Follow-up with Specified Provider: Dr. Villaseñor; 3 Months   As directed      To: Dr. Villaseñor    Follow Up: 3 Months               Pertinent  and/or Most Recent Results     PROCEDURES:   None     LAB RESULTS:      Lab 04/22/22  0708 04/22/22  0527 04/21/22  1614 04/21/22  1202   WBC  --  11.53*  --  13.24*   HEMOGLOBIN  --  11.6*  --  14.5   HEMATOCRIT  --  35.5  --  42.5   PLATELETS  --  191  --  214   NEUTROS ABS  --  8.64*  --  11.80*   IMMATURE GRANS (ABS)  --  0.04  --  0.07*   LYMPHS ABS  --  1.72  --  0.39*   MONOS ABS  --  0.97*  --  0.92*   EOS ABS  --  0.13  --  0.02   MCV  --  92.0  --  89.9   PROCALCITONIN 1.35*  --   --  0.44*   LACTATE  --   --  2.0 3.1*         Lab 04/22/22  0708 04/21/22  1202   SODIUM 142 140   POTASSIUM 3.6 3.7   CHLORIDE 107 101   CO2 25.3 24.4   ANION GAP 9.7 14.6   BUN 16 14   CREATININE 0.71 0.82   EGFR 86.1 72.4   GLUCOSE 113* 180*   CALCIUM 8.2* 9.6   MAGNESIUM 1.6  --    TSH 1.300  --          Lab 04/22/22  0708 04/21/22  1202   TOTAL PROTEIN 5.2* 7.1   ALBUMIN 3.40* 4.50   GLOBULIN 1.8 2.6   ALT (SGPT) 14 23   AST (SGOT) 14 21   BILIRUBIN 0.9 1.4*   ALK PHOS 60 81   LIPASE  --  14         Lab 04/22/22  0708 04/21/22  1202   PROBNP  --  234.4   TROPONIN T <0.010 <0.010                 Brief Urine Lab Results  (Last result in the past 365 days)      Color   Clarity   Blood   Leuk Est   Nitrite   Protein   CREAT   Urine HCG        04/21/22 1730 Yellow   Clear   Negative   Negative    Negative   Negative               Microbiology Results (last 10 days)     Procedure Component Value - Date/Time    Legionella Antigen, Urine - Urine, Urine, Clean Catch [[id number]]  (Normal) Collected: 04/21/22 2328    Lab Status: Final result Specimen: Urine, Clean Catch Updated: 04/21/22 2357     LEGIONELLA ANTIGEN, URINE Negative    S. Pneumo Ag Urine or CSF - Urine, Urine, Clean Catch [[id number]]  (Normal) Collected: 04/21/22 2328    Lab Status: Final result Specimen: Urine, Clean Catch Updated: 04/21/22 2357     Strep Pneumo Ag Negative    COVID-19,APTIMA PANTHER(TEQUILA),[facility], NP/OP SWAB IN UTM/VTM/SALINE TRANSPORT MEDIA,24 HR TAT - Swab, Nasopharynx [[id number]]  (Normal) Collected: 04/21/22 1627    Lab Status: Final result Specimen: Swab from Nasopharynx Updated: 04/21/22 2102     COVID19 Not Detected    Narrative:      Fact sheet for providers: https://www.fda.gov/media/777354/download     Fact sheet for patients: https://www.fda.gov/media/445734/download    Test performed by RT PCR.    Influenza Antigen, Rapid - Swab, Nasopharynx [[id number]]  (Normal) Collected: 04/21/22 1420    Lab Status: Final result Specimen: Swab from Nasopharynx Updated: 04/21/22 1448     Influenza A Ag, EIA Negative     Influenza B Ag, EIA Negative    Blood Culture - Blood, Arm, Left [[id number]]  (Normal) Collected: 04/21/22 1258    Lab Status: Preliminary result Specimen: Blood from Arm, Left Updated: 04/22/22 1303     Blood Culture No growth at 24 hours    Blood Culture - Blood, Arm, Left [[id number]]  (Normal) Collected: 04/21/22 1202    Lab Status: Preliminary result Specimen: Blood from Arm, Left Updated: 04/22/22 1232     Blood Culture No growth at 24 hours          CT Chest With Contrast Diagnostic    Result Date: 4/21/2022  Impression:    1. Given mild motion limitations no evidence of pulmonary embolus is identified.  2. Peribronchial opacities most compatible with pneumonia greater on the left than the right.  Follow-up  in 1-3 months to document resolution is recommend     FARZANA VU MD       Electronically Signed and Approved By: FARZANA VU MD on 4/21/2022 at 18:14                           Labs Pending at Discharge:  Pending Labs     Order Current Status    Blood Culture - Blood, Arm, Left Preliminary result    Blood Culture - Blood, Arm, Left Preliminary result              Electronically signed by Alexander Allen DO, 04/22/22, 1:51 PM EDT.

## 2022-04-22 NOTE — PLAN OF CARE
Goal Outcome Evaluation:  Plan of Care Reviewed With: patient           Outcome Evaluation: Awaiting discharge

## 2022-04-23 ENCOUNTER — READMISSION MANAGEMENT (OUTPATIENT)
Dept: CALL CENTER | Facility: HOSPITAL | Age: 82
End: 2022-04-23

## 2022-04-23 LAB — QT INTERVAL: 314 MS

## 2022-04-23 NOTE — OUTREACH NOTE
Prep Survey    Flowsheet Row Responses   [facility] facility patient discharged from? [facility]   Is LACE score < 7 ? Yes   Emergency Room discharge w/ pulse ox? No   Eligibility Not Eligible   What are the reasons patient is not eligible? Other  [Low risk for readmission]   Does the patient have one of the following disease processes/diagnoses(primary or secondary)? Other   Prep survey completed? Yes          VAHE JASSO - Registered Nurse

## 2022-04-25 ENCOUNTER — TELEPHONE (OUTPATIENT)
Dept: GASTROENTEROLOGY | Facility: CLINIC | Age: 82
End: 2022-04-25

## 2022-04-25 NOTE — TELEPHONE ENCOUNTER
Patient has called the office and requesting to r/s colonoscopy.   Patient states that she would like to call back after talking with her daughter to get a date to schedule.   Procedure has been cancelled.

## 2022-04-26 LAB
BACTERIA SPEC AEROBE CULT: NORMAL
BACTERIA SPEC AEROBE CULT: NORMAL

## 2022-04-27 NOTE — PROGRESS NOTES
Primary Care Provider  Yasmin Hcek MD     Referring Provider  No ref. provider found     Chief Complaint  Lung Nodule and Pneumonia    Subjective          History of Presenting Illness  Patient is an 81-year-old female, patient of Dr. Villaseñor's was recently hospitalized at [facility] from 4/21/2022 to 4/22/2022 for community-acquired pneumonia and sepsis who presents for follow-up visit today.  Per discharge summary report, patient has a past medical history of osteoarthritis, type 2 diabetes, hypothyroidism, dyslipidemia, hypertension, recurrent bronchitis who presented to the hospital congestion, fever, and not feeling well.  Patient reported that she had not been feeling well several days prior and at some point had some vomiting.  Patient also had some cough and congestion.  Patient was also short of breath.  Patient does have a history of recurrent bronchitis but no history of smoking and no history of COPD.  In the emergency room patient's temperature was 99.4, pulse was 1 10-1 30, respiratory rate was 28 and blood pressure was 94/46 and patient was satting 88% on room air.  Patient was placed on 2 L of oxygen in emergency room.  Patient CBC did show white blood cell count of 13,000.  CMP was normal except for glucose of 180.  Lactate was 3.1.  Influenza was negative.  COVID was negative.  Chest x-ray had showed no abnormalities.  Blood cultures were sent.  There was a concern for sepsis with unknown etiology and the patient was given ceftriaxone and azithromycin.  Patient's procalcitonin was elevated at 1.  Patient's urinary antigens were negative.  Patient did have a chest CT scan completed which showed peribronchial opacities most compatible with pneumonia greater on the left than the right.  Pulmonary and critical care were consulted.  It was recommended the patient transition over to Augmentin to complete a course of antibiotics and a 3-month follow-up CT scan.  Patient states that she  is almost finished with Augmentin.  Patient states that since hospital stay she is feeling better.  Patient states that after she was discharged from the hospital she did have a few nights where she had a fever, however she has been afebrile this past week.  Patient states that she also has some diarrhea from the antibiotic and she is currently taking a probiotic.  Patient states that she has albuterol inhaler to use as needed however she has not had to use it since discharge from the hospital.  Patient states that she is taking Singulair, Claritin, Flonase nasal spray, and Astelin nasal spray for allergies.  Patient states that she is under the care of an allergist and is getting allergy shots every week. Patient denies fever, chills, night sweats, swollen glands in the head and neck, unintentional weight loss, hemoptysis, purulent sputum production, dysphagia, chest pain, palpitations, chest tightness, abdominal pain, nausea, vomiting, and hemochetzia.  Patient also denies any myalgias, changes in sense of taste and/or smell, sore throat, any other coronavirus or flu-like symptoms.  Patient denies any leg swelling, orthopnea, paroxysmal nocturnal dyspnea.  Patient is able to perform activities of daily living.      Review of Systems   Constitutional: Negative for activity change, appetite change, chills, diaphoresis, fatigue, fever, unexpected weight gain and unexpected weight loss.        Negative for Insomnia   HENT: Negative for congestion (Nasal), mouth sores, nosebleeds, postnasal drip, sore throat, swollen glands and trouble swallowing.         Negative for Thrush  Negative for Hoarseness  Negative for Allergies/Hay Fever  Negative for Recent Head injury  Negative for Ear Fullness  Negative for Nasal or Sinus pain  Negative for Dry lips  Negative for Nasal discharge   Respiratory: Positive for cough and shortness of breath. Negative for apnea, chest tightness and wheezing.         Negative for  Hemoptysis  Negative for Pleuritic pain   Cardiovascular: Negative for chest pain, palpitations and leg swelling.        Negative for Claudication  Negative for Cyanosis  Negative for Dyspnea on exertion   Gastrointestinal: Positive for diarrhea (with antibiotic. Pt states that she is taking a Probiotic). Negative for abdominal pain, nausea, vomiting and GERD.   Musculoskeletal: Negative for joint swelling and myalgias.        Negative for Joint pain  Negative for Joint stiffness   Skin: Negative for color change, dry skin, pallor and rash.   Neurological: Negative for syncope, weakness and headache.   Hematological: Negative for adenopathy. Does not bruise/bleed easily.        Family History   Problem Relation Age of Onset   • Stroke Mother    • Diabetes Mother    • Cancer Father    • Colon cancer Father    • Other Brother         Social History     Socioeconomic History   • Marital status:    Tobacco Use   • Smoking status: Never Smoker   • Smokeless tobacco: Never Used   Vaping Use   • Vaping Use: Never used   Substance and Sexual Activity   • Alcohol use: Never   • Drug use: Never   • Sexual activity: Defer        Past Medical History:   Diagnosis Date   • Arthritis    • Diabetes mellitus (HCC)    • Disease of thyroid gland    • Hyperlipidemia    • Hypertension    • Pneumonia    • Seasonal allergies         Immunization History   Administered Date(s) Administered   • COVID-19 (MODERNA) 1st, 2nd, 3rd Dose Only 01/13/2021, 02/09/2021, 11/06/2021   • Flu Vaccine Quad PF >36MO 10/16/2015, 09/25/2018   • Fluad Quad 65+ 09/27/2020   • Fluzone High-Dose 65+yrs 11/06/2021   • Hepatitis B Vaccine Adult IM 06/25/2001, 07/25/2001, 11/27/2001   • Tdap 03/26/2020       Allergies   Allergen Reactions   • Codeine Nausea And Vomiting   • Morphine Nausea And Vomiting   • Telmisartan Other (See Comments)     ALL ARB's, causes cough    • Ace Inhibitors Cough   • Molds & Smuts Other (See Comments)     Seasonal allergies            Current Outpatient Medications:   •  albuterol sulfate  (90 Base) MCG/ACT inhaler, Inhale 1 puff Every 4 (Four) Hours As Needed for Wheezing or Shortness of Air., Disp: , Rfl:   •  amoxicillin-clavulanate (Augmentin) 875-125 MG per tablet, Take 1 tablet by mouth 2 (Two) Times a Day for 7 days., Disp: 14 tablet, Rfl: 0  •  aspirin 81 MG EC tablet, Take 81 mg by mouth Daily., Disp: , Rfl:   •  atorvastatin (LIPITOR) 20 MG tablet, Take 20 mg by mouth Daily., Disp: , Rfl:   •  azelastine (ASTELIN) 0.1 % nasal spray, 2 sprays into the nostril(s) as directed by provider 2 (Two) Times a Day. Use in each nostril as directed, Disp: , Rfl:   •  doxazosin (CARDURA) 4 MG tablet, Take 4 mg by mouth every night at bedtime., Disp: , Rfl:   •  fluticasone (FLONASE) 50 MCG/ACT nasal spray, 1 spray into the nostril(s) as directed by provider Daily., Disp: , Rfl:   •  levothyroxine (SYNTHROID, LEVOTHROID) 75 MCG tablet, Take 75 mcg by mouth Daily., Disp: , Rfl:   •  loratadine (CLARITIN) 10 MG tablet, Take 10 mg by mouth Daily., Disp: , Rfl:   •  metFORMIN (GLUCOPHAGE) 500 MG tablet, Take 500 mg by mouth 2 (Two) Times a Day., Disp: , Rfl:   •  metoprolol succinate XL (TOPROL-XL) 25 MG 24 hr tablet, Take 25 mg by mouth Daily., Disp: , Rfl:   •  montelukast (SINGULAIR) 10 MG tablet, Take 10 mg by mouth Daily., Disp: , Rfl:   •  Vibegron (Gemtesa) 75 MG tablet, Take 1 tablet by mouth Daily for 42 days., Disp: 42 tablet, Rfl: 0  •  vitamin D (ERGOCALCIFEROL) 1.25 MG (97045 UT) capsule capsule, Take 1,000 Units by mouth 2 (Two) Times a Day., Disp: , Rfl:      Objective     Physical Exam  Vital Signs:   WDWN, Alert, NAD.    HEENT:  PERRL, EOMI.  OP, nares clear, no sinus tenderness  Neck:  Supple, no JVD, no thyromegaly.  Lymph: no axillary, cervical, supraclavicular lymphadenopathy noted bilaterally  Chest:   Mildly decreased breath sounds throughout. No wheezes, rales, or rhonchi appreciated.  Normal work of breathing noted.   "Patient is able speak full sentences without difficulty.  CV: RRR, no MGR, pulses 2+, equal.  Abd:  Soft, NT, ND, + BS, no HSM  EXT:  no clubbing, no cyanosis, no edema, no joint tenderness  Neuro:  A&Ox3, CN grossly intact, no focal deficits.  Skin: No rashes or lesions noted.    /59   Pulse 89   Resp 14   Ht 162.6 cm (64\")   Wt 80.7 kg (178 lb)   SpO2 94% Comment: room air  BMI 30.55 kg/m²         Result Review :   I have personally reviewed my last office visit note.         Assessment and Plan      Assessment:  1.  Multifocal community -Acquired Pneumonia with recent hospitalization.   2.  Sepsis secondary to the above, resolved.    3.  History of recurrent bronchitis.        4.  History of 5 mm left lower lobe lung nodule and mediastinal lymphadenopathy.        5. Persistent chronic allergies under the care of [facility] and allergy shots.      6.  Never smoker.       Plan:  1.  Patient is advised to finish antibiotics that were prescribed to her from the hospital.    2.  Continue albuterol inhaler as needed.    3. Continue  Claritin, Singulair, Flonase nasal spray, and Astelin nasal spray as prescribed.       4. Follow up with [facility] as scheduled.  Continue allergy shots as scheduled.         5.  Will repeat chest CT scan again in 3 months to ensure resolution of pneumonia.  Order placed today  6.  Vaccination status: patient reports they are up-to-date with flu,  and Covid vaccines.  Patient states that she will check with [facility] on the status of her pneumonia vaccines and notify our office.  Patient is advised to continue to follow CDC recommendations such as social distancing wearing a mask and washing hands for at least 20 seconds.  7.  Smoking status: Never smoker.      8.  Patient to call the office, 911, or go to the ER with new or worsening symptoms.  9.  Recommend patient to follow-up in 3 to 4 weeks, however patient prefers to wait until her follow-up " appointment August, 2022 that is already scheduled.  Patient states that she will call the office if she has any new or worsening symptoms.          Follow Up   Return for follow up as scheduled.  Patient was given instructions and counseling regarding her condition or for health maintenance advice. Please see specific information pulled into the AVS if appropriate.

## 2022-04-28 ENCOUNTER — OFFICE VISIT (OUTPATIENT)
Dept: PULMONOLOGY | Facility: CLINIC | Age: 82
End: 2022-04-28

## 2022-04-28 VITALS
HEIGHT: 64 IN | RESPIRATION RATE: 14 BRPM | BODY MASS INDEX: 30.39 KG/M2 | WEIGHT: 178 LBS | OXYGEN SATURATION: 94 % | SYSTOLIC BLOOD PRESSURE: 146 MMHG | DIASTOLIC BLOOD PRESSURE: 59 MMHG | HEART RATE: 89 BPM

## 2022-04-28 DIAGNOSIS — J18.9 COMMUNITY ACQUIRED PNEUMONIA, UNSPECIFIED LATERALITY: ICD-10-CM

## 2022-04-28 DIAGNOSIS — J30.2 SEASONAL ALLERGIES: ICD-10-CM

## 2022-04-28 DIAGNOSIS — R91.1 LUNG NODULE: ICD-10-CM

## 2022-04-28 DIAGNOSIS — A41.9 SEPSIS WITHOUT ACUTE ORGAN DYSFUNCTION, DUE TO UNSPECIFIED ORGANISM: Primary | ICD-10-CM

## 2022-04-28 PROCEDURE — 99214 OFFICE O/P EST MOD 30 MIN: CPT | Performed by: NURSE PRACTITIONER

## 2022-05-18 ENCOUNTER — TELEPHONE (OUTPATIENT)
Dept: UROLOGY | Facility: CLINIC | Age: 82
End: 2022-05-18

## 2022-05-18 NOTE — TELEPHONE ENCOUNTER
PT CX APPT ON 5/24/22/OUR OFFICE MADE THE APPT W/WRONG DOCTOR/IT WAS SUPPOSE TO BE W//PT SAID THAT SHE DOES NOT WANT TO APPT THAT HE BLADDER IS DOING BETTER/ANYTHING ELSE TO DO??

## 2022-06-08 ENCOUNTER — LAB (OUTPATIENT)
Dept: LAB | Facility: HOSPITAL | Age: 82
End: 2022-06-08

## 2022-06-08 ENCOUNTER — TRANSCRIBE ORDERS (OUTPATIENT)
Dept: LAB | Facility: HOSPITAL | Age: 82
End: 2022-06-08

## 2022-06-08 DIAGNOSIS — E78.5 HYPERLIPIDEMIA, UNSPECIFIED HYPERLIPIDEMIA TYPE: Primary | ICD-10-CM

## 2022-06-08 DIAGNOSIS — E78.5 HYPERLIPIDEMIA, UNSPECIFIED HYPERLIPIDEMIA TYPE: ICD-10-CM

## 2022-06-08 LAB
ALBUMIN SERPL-MCNC: 4.3 G/DL (ref 3.5–5.2)
ALBUMIN/GLOB SERPL: 2.2 G/DL
ALP SERPL-CCNC: 68 U/L (ref 39–117)
ALT SERPL W P-5'-P-CCNC: 19 U/L (ref 1–33)
ANION GAP SERPL CALCULATED.3IONS-SCNC: 9.7 MMOL/L (ref 5–15)
AST SERPL-CCNC: 21 U/L (ref 1–32)
BILIRUB SERPL-MCNC: 0.6 MG/DL (ref 0–1.2)
BUN SERPL-MCNC: 13 MG/DL (ref 8–23)
BUN/CREAT SERPL: 20 (ref 7–25)
CALCIUM SPEC-SCNC: 9 MG/DL (ref 8.6–10.5)
CHLORIDE SERPL-SCNC: 104 MMOL/L (ref 98–107)
CO2 SERPL-SCNC: 25.3 MMOL/L (ref 22–29)
CREAT SERPL-MCNC: 0.65 MG/DL (ref 0.57–1)
EGFRCR SERPLBLD CKD-EPI 2021: 88.6 ML/MIN/1.73
GLOBULIN UR ELPH-MCNC: 2 GM/DL
GLUCOSE SERPL-MCNC: 111 MG/DL (ref 65–99)
POTASSIUM SERPL-SCNC: 3.9 MMOL/L (ref 3.5–5.2)
PROT SERPL-MCNC: 6.3 G/DL (ref 6–8.5)
SODIUM SERPL-SCNC: 139 MMOL/L (ref 136–145)
VIT B12 BLD-MCNC: 350 PG/ML (ref 211–946)

## 2022-06-08 PROCEDURE — 36415 COLL VENOUS BLD VENIPUNCTURE: CPT

## 2022-06-08 PROCEDURE — 82607 VITAMIN B-12: CPT

## 2022-06-08 PROCEDURE — 80053 COMPREHEN METABOLIC PANEL: CPT

## 2022-06-20 DIAGNOSIS — N32.81 OAB (OVERACTIVE BLADDER): Primary | ICD-10-CM

## 2022-06-20 RX ORDER — VIBEGRON 75 MG/1
75 TABLET, FILM COATED ORAL DAILY
Qty: 90 TABLET | Refills: 3 | Status: SHIPPED | OUTPATIENT
Start: 2022-06-20 | End: 2022-08-12 | Stop reason: SDUPTHER

## 2022-07-18 ENCOUNTER — HOSPITAL ENCOUNTER (OUTPATIENT)
Dept: CT IMAGING | Facility: HOSPITAL | Age: 82
Discharge: HOME | End: 2022-07-18
Admitting: NURSE PRACTITIONER

## 2022-07-18 DIAGNOSIS — R91.8 LUNG NODULES: Primary | ICD-10-CM

## 2022-07-18 DIAGNOSIS — A41.9 SEPSIS WITHOUT ACUTE ORGAN DYSFUNCTION, DUE TO UNSPECIFIED ORGANISM: ICD-10-CM

## 2022-07-18 DIAGNOSIS — R91.1 LUNG NODULE: ICD-10-CM

## 2022-07-18 DIAGNOSIS — J18.9 COMMUNITY ACQUIRED PNEUMONIA, UNSPECIFIED LATERALITY: ICD-10-CM

## 2022-07-18 PROCEDURE — 71250 CT THORAX DX C-: CPT

## 2022-08-10 ENCOUNTER — OFFICE VISIT (OUTPATIENT)
Dept: PULMONOLOGY | Facility: CLINIC | Age: 82
End: 2022-08-10

## 2022-08-10 VITALS
BODY MASS INDEX: 29.96 KG/M2 | SYSTOLIC BLOOD PRESSURE: 135 MMHG | DIASTOLIC BLOOD PRESSURE: 76 MMHG | HEIGHT: 64 IN | TEMPERATURE: 97.5 F | OXYGEN SATURATION: 97 % | HEART RATE: 110 BPM | WEIGHT: 175.5 LBS | RESPIRATION RATE: 16 BRPM

## 2022-08-10 DIAGNOSIS — K44.9 HIATAL HERNIA: ICD-10-CM

## 2022-08-10 DIAGNOSIS — T78.40XD ALLERGY, SUBSEQUENT ENCOUNTER: ICD-10-CM

## 2022-08-10 DIAGNOSIS — J41.1 BRONCHITIS, MUCOPURULENT RECURRENT: ICD-10-CM

## 2022-08-10 DIAGNOSIS — Z23 ENCOUNTER FOR IMMUNIZATION: ICD-10-CM

## 2022-08-10 DIAGNOSIS — R91.1 LUNG NODULE: Primary | ICD-10-CM

## 2022-08-10 DIAGNOSIS — J18.9 COMMUNITY ACQUIRED PNEUMONIA, UNSPECIFIED LATERALITY: ICD-10-CM

## 2022-08-10 DIAGNOSIS — J30.2 SEASONAL ALLERGIES: ICD-10-CM

## 2022-08-10 PROCEDURE — G0009 ADMIN PNEUMOCOCCAL VACCINE: HCPCS | Performed by: INTERNAL MEDICINE

## 2022-08-10 PROCEDURE — 90677 PCV20 VACCINE IM: CPT | Performed by: INTERNAL MEDICINE

## 2022-08-10 PROCEDURE — 99214 OFFICE O/P EST MOD 30 MIN: CPT | Performed by: INTERNAL MEDICINE

## 2022-08-10 RX ORDER — MONTELUKAST SODIUM 10 MG/1
10 TABLET ORAL NIGHTLY
COMMUNITY
Start: 2022-04-26

## 2022-08-10 RX ORDER — FEXOFENADINE HCL 180 MG/1
180 TABLET ORAL DAILY
Qty: 30 TABLET | Refills: 6 | Status: SHIPPED | OUTPATIENT
Start: 2022-08-10 | End: 2023-08-10

## 2022-08-10 RX ORDER — FEXOFENADINE HCL AND PSEUDOEPHEDRINE HCI 60; 120 MG/1; MG/1
1 TABLET, EXTENDED RELEASE ORAL 2 TIMES DAILY
Qty: 30 TABLET | Refills: 0 | OUTPATIENT
Start: 2022-08-10 | End: 2022-09-17

## 2022-08-10 NOTE — PROGRESS NOTES
Primary Care Provider  Yasmin Heck MD     Referring Provider  No ref. provider found     Chief Complaint  Lung Nodule, Bronchitis, Pneumonia, and Follow-up (1 year follow up/ MRI results)    Subjective          Cinthia Díaz presents to [facility]  History of Present Illness  Cinthia Díaz is a 82 y.o. female patient with history of recurrent bronchitis, lung nodules, persistent chronic cough, lifelong non-smoker.  She is here for follow-up.  Since last office visit, patient has been on Claritin once daily, Singulair once daily and Flonase once daily.  She had pneumonia and was admitted to hospital in April 2022.  She was treated with amoxicillin, was discharged on Augmentin.  She later on had pneumonia again on 4 July and was treated with amoxicillin.  She has been on Astelin.  She also has been getting allergy shots through allergy and immunology service.  She had pneumonia shots in past but has not received it over the last several years.  She does have cough with mucus production early in the morning.  She has no nausea or vomiting.  She has dryness of mouth and congestion at times.  No chest pain or chest tightness.    Review of Systems     General:  No Fatigue, No Fever No weight loss or loss of appetite  HEENT: No dysphagia, No Visual Changes, no rhinorrhea  Respiratory:  + cough,+Dyspnea, mucoid phlegm, No Pleuritic Pain, no wheezing, no hemoptysis.  Cardiovascular: Denies chest pain, denies palpitations,+JAFFE, No Chest Pressure  Gastrointestinal:  No Abdominal Pain, No Nausea, No Vomiting, No Diarrhea  Genitourinary:  No Dysuria, No Frequency, No Hesitancy  Musculoskeletal: No muscle pain or swelling  Endocrine:  No Heat Intolerance, No Cold Intolerance  Hematologic:  No Bleeding, No Bruising  Psychiatric:  No Anxiety, No Depression  Neurologic:  No Confusion, no Dysarthria, No Headaches  Skin:  No Rash, No Open Wounds    Family History    Problem Relation Age of Onset   • Stroke Mother    • Diabetes Mother    • Cancer Father    • Colon cancer Father    • Other Brother         Social History     Socioeconomic History   • Marital status:    Tobacco Use   • Smoking status: Never Smoker   • Smokeless tobacco: Never Used   Vaping Use   • Vaping Use: Never used   Substance and Sexual Activity   • Alcohol use: Never   • Drug use: Never   • Sexual activity: Defer        Past Medical History:   Diagnosis Date   • Arthritis    • Diabetes mellitus (HCC)    • Disease of thyroid gland    • Hyperlipidemia    • Hypertension    • Pneumonia    • Seasonal allergies         Immunization History   Administered Date(s) Administered   • COVID-19 (MODERNA) 1st, 2nd, 3rd Dose Only 01/13/2021, 02/09/2021, 11/06/2021   • COVID-19 (MODERNA) BOOSTER 07/06/2022   • Flu Vaccine Quad PF >36MO 10/16/2015, 09/25/2018   • Fluad Quad 65+ 09/27/2020   • Fluzone High-Dose 65+yrs 11/06/2021   • Hepatitis B Vaccine Adult IM 06/25/2001, 07/25/2001, 11/27/2001   • Pneumococcal Conjugate 20-Valent (PCV20) 08/10/2022   • Tdap 03/26/2020         Allergies   Allergen Reactions   • Codeine Nausea And Vomiting   • Morphine Nausea And Vomiting   • Telmisartan Other (See Comments)     ALL ARB's, causes cough    • Ace Inhibitors Cough   • Molds & Smuts Other (See Comments)     Seasonal allergies           Current Outpatient Medications:   •  aspirin 81 MG EC tablet, Take 81 mg by mouth Daily., Disp: , Rfl:   •  atorvastatin (LIPITOR) 20 MG tablet, Take 20 mg by mouth Daily., Disp: , Rfl:   •  azelastine (ASTELIN) 0.1 % nasal spray, 2 sprays into the nostril(s) as directed by provider 2 (Two) Times a Day. Use in each nostril as directed, Disp: , Rfl:   •  doxazosin (CARDURA) 4 MG tablet, Take 4 mg by mouth every night at bedtime., Disp: , Rfl:   •  fluticasone (FLONASE) 50 MCG/ACT nasal spray, 1 spray into the nostril(s) as directed by provider Daily., Disp: , Rfl:   •  levothyroxine  "(SYNTHROID, LEVOTHROID) 75 MCG tablet, Take 75 mcg by mouth Daily., Disp: , Rfl:   •  loratadine (CLARITIN) 10 MG tablet, Take 10 mg by mouth Daily., Disp: , Rfl:   •  metFORMIN (GLUCOPHAGE) 500 MG tablet, Take 500 mg by mouth 2 (Two) Times a Day., Disp: , Rfl:   •  metoprolol succinate XL (TOPROL-XL) 25 MG 24 hr tablet, Take 25 mg by mouth Daily., Disp: , Rfl:   •  montelukast (SINGULAIR) 10 MG tablet, Take 10 mg by mouth., Disp: , Rfl:   •  vitamin D (ERGOCALCIFEROL) 1.25 MG (60693 UT) capsule capsule, Take 1,000 Units by mouth 2 (Two) Times a Day., Disp: , Rfl:   •  albuterol sulfate  (90 Base) MCG/ACT inhaler, Inhale 1 puff Every 4 (Four) Hours As Needed for Wheezing or Shortness of Air., Disp: , Rfl:   •  fexofenadine (Allegra Allergy) 180 MG tablet, Take 1 tablet by mouth Daily., Disp: 30 tablet, Rfl: 6  •  fexofenadine-pseudoephedrine (ALLEGRA-D)  MG per 12 hr tablet, Take 1 tablet by mouth 2 (Two) Times a Day., Disp: 30 tablet, Rfl: 0  •  predniSONE (DELTASONE) 20 MG tablet, Take 1 tablet by mouth 2 (Two) Times a Day., Disp: 10 tablet, Rfl: 0  •  Vibegron (Gemtesa) 75 MG tablet, Take 1 tablet by mouth Daily for 360 days., Disp: 90 tablet, Rfl: 3     Objective   Vital Signs:   /76 (BP Location: Left arm, Patient Position: Sitting, Cuff Size: Adult)   Pulse 110   Temp 97.5 °F (36.4 °C) (Tympanic)   Resp 16   Ht 162.6 cm (64\")   Wt 79.6 kg (175 lb 8 oz)   SpO2 97% Comment: room air  BMI 30.12 kg/m²     Mallampatti classification : 1  Physical Exam  Vital Signs Reviewed  WDWN, Alert, NAD.   HEENT:  PERRL, EOMI.  OP, nares clear, no sinus tenderness  Neck:  Supple, no JVD, no thyromegaly  Lymph: no axillary, cervical, supraclavicular lymphadenopathy noted bilaterally  Chest:  good aeration, bilateral diminished breath sounds, no wheezing, crackles or rhonchi, resonant to percussion b/l  CV: RRR, no MGR, pulses 2+, equal.  Abd:  Soft, NT, ND, + BS, no HSM  EXT:  no clubbing, no cyanosis, " No BLE edema  Neuro:  A&Ox3, CN grossly intact, no focal deficits.  Skin: No rashes or lesions noted     Result Review :   The following data was reviewed by: Arnulfo Villaseñor MD on 08/10/2022:  Common labs    Common Labsle 4/21/22 4/21/22 4/22/22 4/22/22 6/8/22    1202 1202 0527 [id number]    Glucose  180 (A)  113 (A) 111 (A)   BUN  14  16 13   Creatinine  0.82  0.71 0.65   Sodium  140  142 139   Potassium  3.7  3.6 3.9   Chloride  101  107 104   Calcium  9.6  8.2 (A) 9.0   Albumin  4.50  3.40 (A) 4.30   Total Bilirubin  1.4 (A)  0.9 0.6   Alkaline Phosphatase  81  60 68   AST (SGOT)  21  14 21   ALT (SGPT)  23  14 19   WBC 13.24 (A)  11.53 (A)     Hemoglobin 14.5  11.6 (A)     Hematocrit 42.5  35.5     Platelets 214  191     (A) Abnormal value            CMP    CMP 4/21/22 4/22/22 6/8/22   Glucose 180 (A) 113 (A) 111 (A)   BUN 14 16 13   Creatinine 0.82 0.71 0.65   Sodium 140 142 139   Potassium 3.7 3.6 3.9   Chloride 101 107 104   Calcium 9.6 8.2 (A) 9.0   Albumin 4.50 3.40 (A) 4.30   Total Bilirubin 1.4 (A) 0.9 0.6   Alkaline Phosphatase 81 60 68   AST (SGOT) 21 14 21   ALT (SGPT) 23 14 19   (A) Abnormal value            CBC    CBC 12/15/21 4/21/22 4/22/22   WBC 5.31 13.24 (A) 11.53 (A)   RBC 4.49 4.73 3.86   Hemoglobin 14.0 14.5 11.6 (A)   Hematocrit 42.2 42.5 35.5   MCV 94.0 89.9 92.0   MCH 31.2 30.7 30.1   MCHC 33.2 34.1 32.7   RDW 12.2 (A) 13.2 13.4   Platelets 238 214 191   (A) Abnormal value            CBC w/diff    CBC w/Diff 12/15/21 4/21/22 4/22/22   WBC 5.31 13.24 (A) 11.53 (A)   RBC 4.49 4.73 3.86   Hemoglobin 14.0 14.5 11.6 (A)   Hematocrit 42.2 42.5 35.5   MCV 94.0 89.9 92.0   MCH 31.2 30.7 30.1   MCHC 33.2 34.1 32.7   RDW 12.2 (A) 13.2 13.4   Platelets 238 214 191   Neutrophil Rel % 56.2 89.2 (A) 75.0   Immature Granulocyte Rel % 0.2 0.5 0.3   Lymphocyte Rel % 31.5 2.9 (A) 14.9 (A)   Monocyte Rel % 8.1 6.9 8.4   Eosinophil Rel % 3.4 0.2 (A) 1.1   Basophil Rel % 0.6 0.3 0.3   (A) Abnormal value             Data reviewed: Radiologic studies Previous imaging reviewed.     Shows improvement on pneumonia from during hospitalization.       Assessment and Plan    Diagnoses and all orders for this visit:    1. Lung nodule (Primary)  -     Pneumococcal Conjugate Vaccine 20-Valent (PCV20)  -     fexofenadine-pseudoephedrine (ALLEGRA-D)  MG per 12 hr tablet; Take 1 tablet by mouth 2 (Two) Times a Day.  Dispense: 30 tablet; Refill: 0  -     fexofenadine (Allegra Allergy) 180 MG tablet; Take 1 tablet by mouth Daily.  Dispense: 30 tablet; Refill: 6    2. Allergy, subsequent encounter  -     Pneumococcal Conjugate Vaccine 20-Valent (PCV20)  -     fexofenadine-pseudoephedrine (ALLEGRA-D)  MG per 12 hr tablet; Take 1 tablet by mouth 2 (Two) Times a Day.  Dispense: 30 tablet; Refill: 0  -     fexofenadine (Allegra Allergy) 180 MG tablet; Take 1 tablet by mouth Daily.  Dispense: 30 tablet; Refill: 6    3. Bronchitis, mucopurulent recurrent (HCC)  -     Pneumococcal Conjugate Vaccine 20-Valent (PCV20)  -     fexofenadine-pseudoephedrine (ALLEGRA-D)  MG per 12 hr tablet; Take 1 tablet by mouth 2 (Two) Times a Day.  Dispense: 30 tablet; Refill: 0  -     fexofenadine (Allegra Allergy) 180 MG tablet; Take 1 tablet by mouth Daily.  Dispense: 30 tablet; Refill: 6    4. Hiatal hernia  -     Pneumococcal Conjugate Vaccine 20-Valent (PCV20)  -     fexofenadine-pseudoephedrine (ALLEGRA-D)  MG per 12 hr tablet; Take 1 tablet by mouth 2 (Two) Times a Day.  Dispense: 30 tablet; Refill: 0  -     fexofenadine (Allegra Allergy) 180 MG tablet; Take 1 tablet by mouth Daily.  Dispense: 30 tablet; Refill: 6    5. Community acquired pneumonia, unspecified laterality  -     Pneumococcal Conjugate Vaccine 20-Valent (PCV20)  -     fexofenadine-pseudoephedrine (ALLEGRA-D)  MG per 12 hr tablet; Take 1 tablet by mouth 2 (Two) Times a Day.  Dispense: 30 tablet; Refill: 0  -     fexofenadine (Allegra Allergy) 180 MG tablet; Take 1  tablet by mouth Daily.  Dispense: 30 tablet; Refill: 6    6. Seasonal allergies  -     Pneumococcal Conjugate Vaccine 20-Valent (PCV20)  -     fexofenadine-pseudoephedrine (ALLEGRA-D)  MG per 12 hr tablet; Take 1 tablet by mouth 2 (Two) Times a Day.  Dispense: 30 tablet; Refill: 0  -     fexofenadine (Allegra Allergy) 180 MG tablet; Take 1 tablet by mouth Daily.  Dispense: 30 tablet; Refill: 6    7. Encounter for immunization   -     Pneumococcal Conjugate Vaccine 20-Valent (PCV20)      Continue with Singulair once daily.  Switch Claritin to Allegra.  I will give her a prescription for Allegra-D for 15 days.  Then switch to Allegra once daily  Allergy shots through allergy and immunology service    Her CT scan shows improvement from her previous pneumonia.    Administer Prevnar 20 vaccine today.    Will likely benefit from azelastine nasal spray.  Keep herself as active as possible.    Advised her to get flu shot once it is available.  Up-to-date on COVID-vaccine.    Follow Up   No follow-ups on file.  Patient was given instructions and counseling regarding her condition or for health maintenance advice. Please see specific information pulled into the AVS if appropriate.       Electronically signed by Arnulfo Villaseñor MD, 8/10/2022, 16:40 EDT.

## 2022-08-12 ENCOUNTER — OFFICE VISIT (OUTPATIENT)
Dept: UROLOGY | Facility: CLINIC | Age: 82
End: 2022-08-12

## 2022-08-12 VITALS — BODY MASS INDEX: 30.08 KG/M2 | HEIGHT: 64 IN | WEIGHT: 176.2 LBS

## 2022-08-12 DIAGNOSIS — N32.81 OAB (OVERACTIVE BLADDER): Primary | ICD-10-CM

## 2022-08-12 LAB
BILIRUB BLD-MCNC: NEGATIVE MG/DL
CLARITY, POC: CLEAR
COLOR UR: YELLOW
EXPIRATION DATE: 823
GLUCOSE UR STRIP-MCNC: NEGATIVE MG/DL
KETONES UR QL: NEGATIVE
LEUKOCYTE EST, POC: ABNORMAL
Lab: ABNORMAL
NITRITE UR-MCNC: NEGATIVE MG/ML
PH UR: 6 [PH] (ref 5–8)
PROT UR STRIP-MCNC: NEGATIVE MG/DL
RBC # UR STRIP: ABNORMAL /UL
SP GR UR: 1.02 (ref 1–1.03)
UROBILINOGEN UR QL: NORMAL

## 2022-08-12 PROCEDURE — 99213 OFFICE O/P EST LOW 20 MIN: CPT | Performed by: UROLOGY

## 2022-08-12 PROCEDURE — 81003 URINALYSIS AUTO W/O SCOPE: CPT | Performed by: UROLOGY

## 2022-08-12 RX ORDER — VIBEGRON 75 MG/1
75 TABLET, FILM COATED ORAL DAILY
Qty: 90 TABLET | Refills: 3 | Status: SHIPPED | OUTPATIENT
Start: 2022-08-12 | End: 2023-01-23

## 2022-08-12 NOTE — PROGRESS NOTES
"Chief Complaint  No chief complaint on file.    Subjective          Cinthia Díaz presents to [facility] UROLOGY  History of Present Illness    The patient is a follow-up for overactive bladder. She was last seen in 03/2022. I had started her on Myrbetriq and given her samples. We had also talked about raising her legs at night. She has also tried samples of Gemtesa as well.    The patient reports the Gemtesa worked great; however, it was pretty expensive. She reports she has always had loose bowel movements and she felt it even helped with that. She reports she has been working trying to get the medication covered through patient assistance and her insurance.      Objective   Vital Signs:   Ht 162.6 cm (64\")   Wt 79.9 kg (176 lb 3.2 oz)   BMI 30.24 kg/m²       Physical Exam  Vitals and nursing note reviewed.   Constitutional:       Appearance: Normal appearance. She is well-developed.   Pulmonary:      Effort: Pulmonary effort is normal.      Breath sounds: Normal air entry.   Neurological:      Mental Status: She is alert and oriented to person, place, and time.      Motor: Motor function is intact.   Psychiatric:         Mood and Affect: Mood normal.         Behavior: Behavior normal.          Result Review :   The following data was reviewed by: Jasmine Brown MD on 08/12/2022:    Results for orders placed or performed in visit on 08/12/22   POC Urinalysis Dipstick, Automated    Specimen: Urine   Result Value Ref Range    Color Yellow Yellow, Straw, Dark Yellow, Florina    Clarity, UA Clear Clear    Specific Gravity  1.020 1.005 - 1.030    pH, Urine 6.0 5.0 - 8.0    Leukocytes Small (1+) (A) Negative    Nitrite, UA Negative Negative    Protein, POC Negative Negative mg/dL    Glucose, UA Negative Negative mg/dL    Ketones, UA Negative Negative    Urobilinogen, UA Normal Normal    Bilirubin Negative Negative    Blood, UA Trace (A) Negative    Lot Number [id number]     Expiration Date 823  "            Assessment and Plan    Diagnoses and all orders for this visit:    1. OAB (overactive bladder) (Primary)  -     POC Urinalysis Dipstick, Automated  -     Vibegron (Gemtesa) 75 MG tablet; Take 1 tablet by mouth Daily for 360 days.  Dispense: 90 tablet; Refill: 3  -     Vibegron 75 MG tablet; Take 1 tablet by mouth Daily for 56 days.  Dispense: 56 tablet; Refill: 0    - She was happy with Gemtesa, but unfortunately was pretty expensive. She has been working through trying to get that covered through patient assistance and her insurance. I did write a prescription today that she is going to try to get filled at [facility]. I am also giving her samples and we will plan on seeing her back in a year. If she has issues getting it filled, she will let us know. We will gave her samples of Gemtesa today as well.          Follow Up       No follow-ups on file.  Patient was given instructions and counseling regarding her condition or for health maintenance advice. Please see specific information pulled into the AVS if appropriate.     Transcribed from ambient dictation for Jasmnie Brown MD by Micaela Martinez.  08/12/22   14:00 EDT    Patient verbalized consent to the visit recording.  I have personally performed the services described in this document as transcribed by the above individual, and it is both accurate and complete.  Jasmine Brown MD  8/12/2022  15:00 EDT

## 2022-10-06 ENCOUNTER — OFFICE VISIT (OUTPATIENT)
Dept: PULMONOLOGY | Facility: CLINIC | Age: 82
End: 2022-10-06

## 2022-10-06 VITALS
DIASTOLIC BLOOD PRESSURE: 73 MMHG | OXYGEN SATURATION: 94 % | HEIGHT: 64 IN | TEMPERATURE: 98.2 F | HEART RATE: 96 BPM | BODY MASS INDEX: 30.05 KG/M2 | RESPIRATION RATE: 16 BRPM | WEIGHT: 176 LBS | SYSTOLIC BLOOD PRESSURE: 148 MMHG

## 2022-10-06 DIAGNOSIS — J34.89 RHINORRHEA: ICD-10-CM

## 2022-10-06 DIAGNOSIS — Z23 ENCOUNTER FOR IMMUNIZATION: ICD-10-CM

## 2022-10-06 DIAGNOSIS — J41.1 BRONCHITIS, MUCOPURULENT RECURRENT: Primary | ICD-10-CM

## 2022-10-06 DIAGNOSIS — R91.1 LUNG NODULE: ICD-10-CM

## 2022-10-06 DIAGNOSIS — J18.9 RECURRENT PNEUMONIA: ICD-10-CM

## 2022-10-06 DIAGNOSIS — R05.1 ACUTE COUGH: ICD-10-CM

## 2022-10-06 DIAGNOSIS — J30.2 SEASONAL ALLERGIES: ICD-10-CM

## 2022-10-06 PROCEDURE — G0008 ADMIN INFLUENZA VIRUS VAC: HCPCS | Performed by: NURSE PRACTITIONER

## 2022-10-06 PROCEDURE — 99214 OFFICE O/P EST MOD 30 MIN: CPT | Performed by: NURSE PRACTITIONER

## 2022-10-06 PROCEDURE — 90662 IIV NO PRSV INCREASED AG IM: CPT | Performed by: NURSE PRACTITIONER

## 2022-10-06 RX ORDER — PREDNISONE 20 MG/1
40 TABLET ORAL DAILY
Qty: 14 TABLET | Refills: 0 | Status: SHIPPED | OUTPATIENT
Start: 2022-10-06 | End: 2022-10-13

## 2022-10-06 RX ORDER — DOXYCYCLINE HYCLATE 100 MG/1
100 CAPSULE ORAL 2 TIMES DAILY
Qty: 20 CAPSULE | Refills: 0 | Status: SHIPPED | OUTPATIENT
Start: 2022-10-06 | End: 2022-11-11

## 2022-10-06 NOTE — PROGRESS NOTES
"Primary Care Provider  Yasmin Heck MD     Referring Provider  No ref. provider found     Chief Complaint  Cough and acute visit (Seen at ER/UC - not feeling better. )    Subjective          Cinthia Díaz presents to [facility] PULMONARY & CRITICAL CARE MEDICINE  History of Present Illness  Cinthia Díaz is a 82 y.o. female patient of Dr. Villaseñor here for management of pulmonary nodule, seasonal allergies, recurrent pneumonia, cough and dyspnea on exertion.    Patient states she is doing okay since her last visit.  Unfortunately, she was recently seen at urgent care for an upper respiratory infection and cough.  She had complained of symptoms for approximately 1 week.  She was sent home with a cough syrup.  Her COVID and influenza test were negative.  She is following up because she states that she is not getting any better since being seen by urgent care.  She was given antibiotics and steroids in May, July and September.  She continues to receive allergy injections weekly per her allergist.  She is also on Allegra, Claritin and Singulair.  She is also taking Flonase and azelastine nasal spray.  Her recent chest CT from July showed improvement in the previously pneumonia, however she did have a recent chest x-ray in urgent care which did not show any signs of pneumonia.  Patient states that a similar situation happened to her back in 2017 and it took her multiple rounds of antibiotics and steroids before she only got over her symptoms.  She is agreeable to taking a another round of antibiotics at this time.  She states that she is not \"sick enough\" to take prednisone, however she is agreeable to having some on hand if she were to need some over the weekend.  She does have a cough but states that it is nonproductive.  She denies any fevers or chills.  She also complains of rhinorrhea with clear discharge.  She denies any significant shortness of breath and has not needed to use her " albuterol inhaler.  She still able to perform her ADLs without difficulty.  She is up-to-date with her COVID and pneumonia vaccines.  She did like to receive a flu vaccine today in office.         Her history of smoking is   Tobacco Use: Low Risk    • Smoking Tobacco Use: Never Smoker   • Smokeless Tobacco Use: Never Used   .    Review of Systems   Constitutional: Negative for chills, fatigue, fever, unexpected weight gain and unexpected weight loss.   HENT: Negative for congestion (Nasal), hearing loss, mouth sores, nosebleeds, postnasal drip, sore throat and trouble swallowing.    Eyes: Negative for blurred vision and visual disturbance.   Respiratory: Positive for cough. Negative for apnea, shortness of breath and wheezing.         Negative for Hemoptysis     Cardiovascular: Negative for chest pain, palpitations and leg swelling.   Gastrointestinal: Negative for abdominal pain, constipation, diarrhea, nausea, vomiting and GERD.        Negative for Jaundice  Negative for Bloating  Negative for Melena   Musculoskeletal: Negative for joint swelling and myalgias.        Negative for Joint pain  Negative for Joint stiffness   Skin: Negative for color change.        Negative for cyanosis   Neurological: Negative for syncope, weakness, numbness and headache.      Sleep: Negative for Excessive daytime sleepiness  Negative for morning headaches  Negative for Snoring    Family History   Problem Relation Age of Onset   • Stroke Mother    • Diabetes Mother    • Cancer Father    • Colon cancer Father    • Other Brother         Social History     Socioeconomic History   • Marital status:    Tobacco Use   • Smoking status: Never Smoker   • Smokeless tobacco: Never Used   Vaping Use   • Vaping Use: Never used   Substance and Sexual Activity   • Alcohol use: Never   • Drug use: Never   • Sexual activity: Defer        Past Medical History:   Diagnosis Date   • Arthritis    • Diabetes mellitus (HCC)    • Disease of thyroid  gland    • Hyperlipidemia    • Hypertension    • Pneumonia    • Seasonal allergies         Immunization History   Administered Date(s) Administered   • COVID-19 (MODERNA) 1st, 2nd, 3rd Dose Only 01/13/2021, 02/09/2021, 11/06/2021   • COVID-19 (MODERNA) BOOSTER 07/06/2022   • Flu Vaccine Quad PF >36MO 10/16/2015, 09/25/2018   • Fluad Quad 65+ 09/27/2020   • Fluzone High-Dose 65+yrs 11/06/2021, 10/06/2022   • Hepatitis B Vaccine Adult IM 06/25/2001, 07/25/2001, 11/27/2001   • Pneumococcal Conjugate 20-Valent (PCV20) 08/10/2022   • Tdap 03/26/2020         Allergies   Allergen Reactions   • Codeine Nausea And Vomiting   • Morphine Nausea And Vomiting   • Telmisartan Other (See Comments)     ALL ARB's, causes cough    • Ace Inhibitors Cough   • Molds & Smuts Other (See Comments)     Seasonal allergies           Current Outpatient Medications:   •  albuterol sulfate  (90 Base) MCG/ACT inhaler, Inhale 1 puff Every 4 (Four) Hours As Needed for Wheezing or Shortness of Air., Disp: , Rfl:   •  aspirin 81 MG EC tablet, Take 81 mg by mouth Daily., Disp: , Rfl:   •  atorvastatin (LIPITOR) 20 MG tablet, Take 20 mg by mouth Daily., Disp: , Rfl:   •  azelastine (ASTELIN) 0.1 % nasal spray, 2 sprays into the nostril(s) as directed by provider 2 (Two) Times a Day. Use in each nostril as directed, Disp: , Rfl:   •  fexofenadine (Allegra Allergy) 180 MG tablet, Take 1 tablet by mouth Daily., Disp: 30 tablet, Rfl: 6  •  fluticasone (FLONASE) 50 MCG/ACT nasal spray, 1 spray into the nostril(s) as directed by provider Daily., Disp: , Rfl:   •  levothyroxine (SYNTHROID, LEVOTHROID) 75 MCG tablet, Take 75 mcg by mouth Daily., Disp: , Rfl:   •  loratadine (CLARITIN) 10 MG tablet, Take 10 mg by mouth Daily., Disp: , Rfl:   •  metFORMIN (GLUCOPHAGE) 500 MG tablet, Take 500 mg by mouth 2 (Two) Times a Day., Disp: , Rfl:   •  metoprolol succinate XL (TOPROL-XL) 25 MG 24 hr tablet, Take 25 mg by mouth Daily., Disp: , Rfl:   •  montelukast  (SINGULAIR) 10 MG tablet, Take 10 mg by mouth., Disp: , Rfl:   •  Vibegron (Gemtesa) 75 MG tablet, Take 1 tablet by mouth Daily for 360 days., Disp: 90 tablet, Rfl: 3  •  vitamin D (ERGOCALCIFEROL) 1.25 MG (93588 UT) capsule capsule, Take 1,000 Units by mouth 2 (Two) Times a Day., Disp: , Rfl:   •  brompheniramine-pseudoephedrine-DM 30-2-10 MG/5ML syrup, Take 10 mL by mouth 4 (Four) Times a Day As Needed for Congestion or Cough., Disp: 120 mL, Rfl: 0  •  doxycycline (VIBRAMYCIN) 100 MG capsule, Take 1 capsule by mouth 2 (Two) Times a Day., Disp: 20 capsule, Rfl: 0  •  predniSONE (DELTASONE) 20 MG tablet, Take 2 tablets by mouth Daily for 7 days., Disp: 14 tablet, Rfl: 0  •  promethazine-dextromethorphan (PROMETHAZINE-DM) 6.25-15 MG/5ML syrup, Take 5 mL by mouth At Night As Needed for Cough., Disp: 180 mL, Rfl: 0  •  Vibegron 75 MG tablet, Take 1 tablet by mouth Daily for 56 days., Disp: 56 tablet, Rfl: 0     Objective   Physical Exam  Constitutional:       General: She is not in acute distress.     Appearance: Normal appearance. She is normal weight.   HENT:      Right Ear: Hearing normal.      Left Ear: Hearing normal.      Nose: No nasal tenderness or congestion.      Mouth/Throat:      Mouth: Mucous membranes are moist. No oral lesions.   Eyes:      Extraocular Movements: Extraocular movements intact.      Pupils: Pupils are equal, round, and reactive to light.   Neck:      Thyroid: No thyroid mass or thyromegaly.   Cardiovascular:      Rate and Rhythm: Normal rate and regular rhythm.      Pulses: Normal pulses.      Heart sounds: Normal heart sounds. No murmur heard.  Pulmonary:      Effort: Pulmonary effort is normal.      Breath sounds: Normal breath sounds. No wheezing, rhonchi or rales.   Chest:   Breasts:      Right: No axillary adenopathy.       Abdominal:      General: Bowel sounds are normal. There is no distension.      Palpations: Abdomen is soft.      Tenderness: There is no abdominal tenderness.  "  Musculoskeletal:      Cervical back: Neck supple.      Right lower leg: No edema.      Left lower leg: No edema.   Lymphadenopathy:      Cervical: No cervical adenopathy.      Upper Body:      Right upper body: No axillary adenopathy.   Skin:     General: Skin is warm and dry.      Findings: No lesion or rash.   Neurological:      General: No focal deficit present.      Mental Status: She is alert and oriented to person, place, and time.      Cranial Nerves: Cranial nerves are intact.   Psychiatric:         Mood and Affect: Affect normal. Mood is not anxious or depressed.         Vital Signs:   /73 (BP Location: Left arm, Patient Position: Sitting, Cuff Size: Adult)   Pulse 96   Temp 98.2 °F (36.8 °C) (Temporal)   Resp 16   Ht 162.6 cm (64\")   Wt 79.8 kg (176 lb)   SpO2 94% Comment: room air  BMI 30.21 kg/m²        Result Review :      CMP    CMP 4/21/22 4/22/22 6/8/22   Glucose 180 (A) 113 (A) 111 (A)   BUN 14 16 13   Creatinine 0.82 0.71 0.65   Sodium 140 142 139   Potassium 3.7 3.6 3.9   Chloride 101 107 104   Calcium 9.6 8.2 (A) 9.0   Albumin 4.50 3.40 (A) 4.30   Total Bilirubin 1.4 (A) 0.9 0.6   Alkaline Phosphatase 81 60 68   AST (SGOT) 21 14 21   ALT (SGPT) 23 14 19   (A) Abnormal value            CBC w/diff    CBC w/Diff 12/15/21 4/21/22 4/22/22   WBC 5.31 13.24 (A) 11.53 (A)   RBC 4.49 4.73 3.86   Hemoglobin 14.0 14.5 11.6 (A)   Hematocrit 42.2 42.5 35.5   MCV 94.0 89.9 92.0   MCH 31.2 30.7 30.1   MCHC 33.2 34.1 32.7   RDW 12.2 (A) 13.2 13.4   Platelets 238 214 191   Neutrophil Rel % 56.2 89.2 (A) 75.0   Immature Granulocyte Rel % 0.2 0.5 0.3   Lymphocyte Rel % 31.5 2.9 (A) 14.9 (A)   Monocyte Rel % 8.1 6.9 8.4   Eosinophil Rel % 3.4 0.2 (A) 1.1   Basophil Rel % 0.6 0.3 0.3   (A) Abnormal value            Influenza A&B    [facility] 9/23/22   Rapid Influenza A Ag Negative   Rapid Influenza B Ag Negative           Covid Tests    [facility] 4/21/22   COVID19 Not Detected           Data " reviewed: Radiologic studies Chest CT 4/21/2022, chest CT 7/18/2022, chest x-ray 9/17/2022 and Dr. Villaseñor's last office note   Procedures        Assessment and Plan    Diagnoses and all orders for this visit:    1. Bronchitis, mucopurulent recurrent (HCC) (Primary)  -     IgG, IgA, IgM; Future  -     IgG subclasses (1-4); Future  -     doxycycline (VIBRAMYCIN) 100 MG capsule; Take 1 capsule by mouth 2 (Two) Times a Day.  Dispense: 20 capsule; Refill: 0  -     predniSONE (DELTASONE) 20 MG tablet; Take 2 tablets by mouth Daily for 7 days.  Dispense: 14 tablet; Refill: 0    2. Seasonal allergies    3. Lung nodule    4. Acute cough    5. Encounter for immunization  -     Fluzone High-Dose 65+yrs (2712-0962)    6. Recurrent pneumonia  -     IgG, IgA, IgM; Future  -     IgG subclasses (1-4); Future  -     doxycycline (VIBRAMYCIN) 100 MG capsule; Take 1 capsule by mouth 2 (Two) Times a Day.  Dispense: 20 capsule; Refill: 0    7. Rhinorrhea    8.  Continue Allegra, Claritin, Singulair, azelastine nasal spray and Flonase.  9. Continue albuterol as needed.  10. Continue follow-up with allergist as scheduled.  11.  Flu vaccine given in office today.  12.  Follow-up in 1 month with NP, sooner if needed.        Follow Up   Return in about 1 month (around 11/6/2022) for Recheck with NP.  Patient was given instructions and counseling regarding her condition or for health maintenance advice. Please see specific information pulled into the AVS if appropriate.

## 2022-10-24 ENCOUNTER — LAB (OUTPATIENT)
Dept: LAB | Facility: HOSPITAL | Age: 82
End: 2022-10-24

## 2022-10-24 ENCOUNTER — TRANSCRIBE ORDERS (OUTPATIENT)
Dept: ADMINISTRATIVE | Facility: HOSPITAL | Age: 82
End: 2022-10-24

## 2022-10-24 DIAGNOSIS — E78.5 HYPERLIPIDEMIA, UNSPECIFIED HYPERLIPIDEMIA TYPE: ICD-10-CM

## 2022-10-24 DIAGNOSIS — E03.9 HYPOTHYROIDISM, UNSPECIFIED TYPE: ICD-10-CM

## 2022-10-24 DIAGNOSIS — E11.9 DIABETES MELLITUS WITHOUT COMPLICATION: Primary | ICD-10-CM

## 2022-10-24 DIAGNOSIS — E11.9 DIABETES MELLITUS WITHOUT COMPLICATION: ICD-10-CM

## 2022-10-24 LAB
ALBUMIN SERPL-MCNC: 3.8 G/DL (ref 3.5–5.2)
ALBUMIN/GLOB SERPL: 1.7 G/DL
ALP SERPL-CCNC: 61 U/L (ref 39–117)
ALT SERPL W P-5'-P-CCNC: 20 U/L (ref 1–33)
ANION GAP SERPL CALCULATED.3IONS-SCNC: 10.6 MMOL/L (ref 5–15)
AST SERPL-CCNC: 23 U/L (ref 1–32)
BILIRUB SERPL-MCNC: 0.6 MG/DL (ref 0–1.2)
BUN SERPL-MCNC: 13 MG/DL (ref 8–23)
BUN/CREAT SERPL: 20.6 (ref 7–25)
CALCIUM SPEC-SCNC: 8.8 MG/DL (ref 8.6–10.5)
CHLORIDE SERPL-SCNC: 105 MMOL/L (ref 98–107)
CO2 SERPL-SCNC: 27.4 MMOL/L (ref 22–29)
CREAT SERPL-MCNC: 0.63 MG/DL (ref 0.57–1)
EGFRCR SERPLBLD CKD-EPI 2021: 89.3 ML/MIN/1.73
GLOBULIN UR ELPH-MCNC: 2.2 GM/DL
GLUCOSE SERPL-MCNC: 146 MG/DL (ref 65–99)
HBA1C MFR BLD: 6.9 % (ref 4.8–5.6)
POTASSIUM SERPL-SCNC: 4.4 MMOL/L (ref 3.5–5.2)
PROT SERPL-MCNC: 6 G/DL (ref 6–8.5)
SODIUM SERPL-SCNC: 143 MMOL/L (ref 136–145)
T4 FREE SERPL-MCNC: 1.19 NG/DL (ref 0.93–1.7)
TSH SERPL DL<=0.05 MIU/L-ACNC: 4.42 UIU/ML (ref 0.27–4.2)

## 2022-10-24 PROCEDURE — 84439 ASSAY OF FREE THYROXINE: CPT

## 2022-10-24 PROCEDURE — 36415 COLL VENOUS BLD VENIPUNCTURE: CPT

## 2022-10-24 PROCEDURE — 84443 ASSAY THYROID STIM HORMONE: CPT

## 2022-10-24 PROCEDURE — 80053 COMPREHEN METABOLIC PANEL: CPT

## 2022-10-24 PROCEDURE — 83036 HEMOGLOBIN GLYCOSYLATED A1C: CPT

## 2022-11-02 ENCOUNTER — TELEPHONE (OUTPATIENT)
Dept: UROLOGY | Facility: CLINIC | Age: 82
End: 2022-11-02

## 2022-11-02 NOTE — TELEPHONE ENCOUNTER
Caller: SOREN    Relationship: PCP OFFICE    Best call back number: [phone]    What form or medical record are you requesting: LAST OFFICE NOTE W/DR LYNN 8/12/22    Who is requesting this form or medical record from you: DR JESS COBB    How would you like to receive the form or medical records (pick-up, mail, fax): FAX  If fax, what is the fax number: [phone]    Timeframe paperwork needed: ASAP    Additional notes:

## 2022-11-11 ENCOUNTER — OFFICE VISIT (OUTPATIENT)
Dept: PULMONOLOGY | Facility: CLINIC | Age: 82
End: 2022-11-11

## 2022-11-11 VITALS
DIASTOLIC BLOOD PRESSURE: 71 MMHG | HEIGHT: 64 IN | OXYGEN SATURATION: 96 % | SYSTOLIC BLOOD PRESSURE: 150 MMHG | HEART RATE: 88 BPM | WEIGHT: 170 LBS | RESPIRATION RATE: 16 BRPM | BODY MASS INDEX: 29.02 KG/M2 | TEMPERATURE: 97.5 F

## 2022-11-11 DIAGNOSIS — J30.9 ALLERGIC RHINITIS, UNSPECIFIED SEASONALITY, UNSPECIFIED TRIGGER: ICD-10-CM

## 2022-11-11 DIAGNOSIS — J41.1 BRONCHITIS, MUCOPURULENT RECURRENT: Primary | ICD-10-CM

## 2022-11-11 DIAGNOSIS — R06.09 DYSPNEA ON EXERTION: ICD-10-CM

## 2022-11-11 DIAGNOSIS — J34.89 RHINORRHEA: ICD-10-CM

## 2022-11-11 DIAGNOSIS — R05.9 COUGH, UNSPECIFIED TYPE: ICD-10-CM

## 2022-11-11 DIAGNOSIS — J30.2 SEASONAL ALLERGIES: ICD-10-CM

## 2022-11-11 PROCEDURE — 99213 OFFICE O/P EST LOW 20 MIN: CPT | Performed by: NURSE PRACTITIONER

## 2022-11-11 NOTE — PROGRESS NOTES
Primary Care Provider  Yasmin Heck MD     Referring Provider  No ref. provider found     Chief Complaint  Follow-up (1 mo f/up - allergies, runny nose. )    Subjective          Cinthia Díaz presents to [facility] PULMONARY & CRITICAL CARE MEDICINE  History of Present Illness  Cinthia Díaz is a 82 y.o. female patient of Dr. Villaseñor here for management of pulmonary nodule, recurrent mucopurulent bronchitis, seasonal allergies, recurrent pneumonia, cough and dyspnea on exertion.    Patient states that she is doing okay since her last visit.  She denies using antibiotics or steroids for her lungs were she denies any fevers or chills.  Patient believes that she has recovered from the pneumonia over the summer/fall.  Patient's main concerns are her allergies.  She does follow-up with an allergist.  She continues to receive allergy injections once a week.  She continues to take azelastine nasal spray, Flonase and Singulair.  Patient states that she has not been using Claritin or Allegra-D.  She also states that her shortness of breath is very mild in severity and she does not feel that she needs a rescue inhaler at this time.  Overall, she is doing well and has no concerns at this time.  She is able to perform her ADLs without difficulty.  She is up-to-date with her COVID, flu and pneumonia vaccines.     Her history of smoking is   Tobacco Use: Low Risk    • Smoking Tobacco Use: Never   • Smokeless Tobacco Use: Never   • Passive Exposure: Not on file   .    Review of Systems   Constitutional: Negative for chills, fatigue, fever, unexpected weight gain and unexpected weight loss.   HENT: Positive for rhinorrhea. Negative for congestion (Nasal), hearing loss, mouth sores, nosebleeds, postnasal drip, sore throat and trouble swallowing.    Eyes: Negative for blurred vision and visual disturbance.   Respiratory: Negative for apnea, cough, shortness of breath and wheezing.         Negative  for Hemoptysis     Cardiovascular: Negative for chest pain, palpitations and leg swelling.   Gastrointestinal: Negative for abdominal pain, constipation, diarrhea, nausea, vomiting and GERD.        Negative for Jaundice  Negative for Bloating  Negative for Melena   Musculoskeletal: Negative for joint swelling and myalgias.        Negative for Joint pain  Negative for Joint stiffness   Skin: Negative for color change.        Negative for cyanosis   Neurological: Negative for syncope, weakness, numbness and headache.      Sleep: Negative for Excessive daytime sleepiness  Negative for morning headaches  Negative for Snoring    Family History   Problem Relation Age of Onset   • Stroke Mother    • Diabetes Mother    • Cancer Father    • Colon cancer Father    • Other Brother         Social History     Socioeconomic History   • Marital status:    Tobacco Use   • Smoking status: Never   • Smokeless tobacco: Never   Vaping Use   • Vaping Use: Never used   Substance and Sexual Activity   • Alcohol use: Never   • Drug use: Never   • Sexual activity: Defer        Past Medical History:   Diagnosis Date   • Arthritis    • Diabetes mellitus (HCC)    • Disease of thyroid gland    • Hyperlipidemia    • Hypertension    • Pneumonia    • Seasonal allergies         Immunization History   Administered Date(s) Administered   • COVID-19 (MODERNA) 1st, 2nd, 3rd Dose Only 01/13/2021, 02/09/2021, 11/06/2021   • COVID-19 (MODERNA) BOOSTER 07/06/2022   • Flu Vaccine Quad PF >36MO 10/16/2015, 09/25/2018   • Fluad Quad 65+ 09/27/2020   • Fluzone High-Dose 65+yrs 11/06/2021, 10/06/2022   • Hepatitis B Vaccine Adult IM 06/25/2001, 07/25/2001, 11/27/2001   • Pneumococcal Conjugate 20-Valent (PCV20) 08/10/2022   • Tdap 03/26/2020         Allergies   Allergen Reactions   • Codeine Nausea And Vomiting   • Morphine Nausea And Vomiting   • Telmisartan Other (See Comments)     ALL ARB's, causes cough    • Ace Inhibitors Cough   • Molds & Smuts  Other (See Comments)     Seasonal allergies           Current Outpatient Medications:   •  aspirin 81 MG EC tablet, Take 81 mg by mouth Daily., Disp: , Rfl:   •  atorvastatin (LIPITOR) 20 MG tablet, Take 20 mg by mouth Daily., Disp: , Rfl:   •  azelastine (ASTELIN) 0.1 % nasal spray, 2 sprays into the nostril(s) as directed by provider 2 (Two) Times a Day. Use in each nostril as directed, Disp: , Rfl:   •  fluticasone (FLONASE) 50 MCG/ACT nasal spray, 1 spray into the nostril(s) as directed by provider Daily., Disp: , Rfl:   •  levothyroxine (SYNTHROID, LEVOTHROID) 75 MCG tablet, Take 75 mcg by mouth Daily., Disp: , Rfl:   •  metFORMIN (GLUCOPHAGE) 500 MG tablet, Take 500 mg by mouth 2 (Two) Times a Day., Disp: , Rfl:   •  metoprolol succinate XL (TOPROL-XL) 25 MG 24 hr tablet, Take 25 mg by mouth Daily., Disp: , Rfl:   •  montelukast (SINGULAIR) 10 MG tablet, Take 10 mg by mouth., Disp: , Rfl:   •  Vibegron (Gemtesa) 75 MG tablet, Take 1 tablet by mouth Daily for 360 days., Disp: 90 tablet, Rfl: 3  •  vitamin D (ERGOCALCIFEROL) 1.25 MG (54012 UT) capsule capsule, Take 1,000 Units by mouth 2 (Two) Times a Day., Disp: , Rfl:   •  albuterol sulfate  (90 Base) MCG/ACT inhaler, Inhale 1 puff Every 4 (Four) Hours As Needed for Wheezing or Shortness of Air., Disp: , Rfl:   •  fexofenadine (Allegra Allergy) 180 MG tablet, Take 1 tablet by mouth Daily., Disp: 30 tablet, Rfl: 6  •  loratadine (CLARITIN) 10 MG tablet, Take 10 mg by mouth Daily., Disp: , Rfl:   •  promethazine-dextromethorphan (PROMETHAZINE-DM) 6.25-15 MG/5ML syrup, Take 5 mL by mouth At Night As Needed for Cough., Disp: 180 mL, Rfl: 0     Objective   Physical Exam  Constitutional:       General: She is not in acute distress.     Appearance: Normal appearance. She is normal weight.   HENT:      Right Ear: Hearing normal.      Left Ear: Hearing normal.      Nose: No nasal tenderness or congestion.      Mouth/Throat:      Mouth: Mucous membranes are  "moist. No oral lesions.   Eyes:      Extraocular Movements: Extraocular movements intact.      Pupils: Pupils are equal, round, and reactive to light.   Neck:      Thyroid: No thyroid mass or thyromegaly.   Cardiovascular:      Rate and Rhythm: Normal rate and regular rhythm.      Pulses: Normal pulses.      Heart sounds: Normal heart sounds. No murmur heard.  Pulmonary:      Effort: Pulmonary effort is normal.      Breath sounds: Normal breath sounds. No wheezing, rhonchi or rales.   Abdominal:      General: Bowel sounds are normal. There is no distension.      Palpations: Abdomen is soft.      Tenderness: There is no abdominal tenderness.   Musculoskeletal:      Cervical back: Neck supple.      Right lower leg: No edema.      Left lower leg: No edema.   Lymphadenopathy:      Cervical: No cervical adenopathy.      Upper Body:      Right upper body: No axillary adenopathy.   Skin:     General: Skin is warm and dry.      Findings: No lesion or rash.   Neurological:      General: No focal deficit present.      Mental Status: She is alert and oriented to person, place, and time.      Cranial Nerves: Cranial nerves are intact.   Psychiatric:         Mood and Affect: Affect normal. Mood is not anxious or depressed.         Vital Signs:   /71 (BP Location: Right arm, Patient Position: Sitting, Cuff Size: Adult)   Pulse 88   Temp 97.5 °F (36.4 °C) (Temporal)   Resp 16   Ht 162.6 cm (64\")   Wt 77.1 kg (170 lb)   SpO2 96% Comment: room air  BMI 29.18 kg/m²        Result Review :     CMP    CMP 4/22/22 6/8/22 10/24/22   Glucose 113 (A) 111 (A) 146 (A)   BUN 16 13 13   Creatinine 0.71 0.65 0.63   Sodium 142 139 143   Potassium 3.6 3.9 4.4   Chloride 107 104 105   Calcium 8.2 (A) 9.0 8.8   Albumin 3.40 (A) 4.30 3.80   Total Bilirubin 0.9 0.6 0.6   Alkaline Phosphatase 60 68 61   AST (SGOT) 14 21 23   ALT (SGPT) 14 19 20   (A) Abnormal value            CBC w/diff    CBC w/Diff 12/15/21 4/21/22 4/22/22   WBC 5.31 " 13.24 (A) 11.53 (A)   RBC 4.49 4.73 3.86   Hemoglobin 14.0 14.5 11.6 (A)   Hematocrit 42.2 42.5 35.5   MCV 94.0 89.9 92.0   MCH 31.2 30.7 30.1   MCHC 33.2 34.1 32.7   RDW 12.2 (A) 13.2 13.4   Platelets 238 214 191   Neutrophil Rel % 56.2 89.2 (A) 75.0   Immature Granulocyte Rel % 0.2 0.5 0.3   Lymphocyte Rel % 31.5 2.9 (A) 14.9 (A)   Monocyte Rel % 8.1 6.9 8.4   Eosinophil Rel % 3.4 0.2 (A) 1.1   Basophil Rel % 0.6 0.3 0.3   (A) Abnormal value            Data reviewed: Radiologic studies Chest CT 4/21/2022, chest CT 7/13/2022, chest x-ray 9/17/2022 and My last office note   Procedures        Assessment and Plan    Diagnoses and all orders for this visit:    1. Bronchitis, mucopurulent recurrent (HCC) (Primary)    2. Seasonal allergies    3. Dyspnea on exertion    4. Cough, unspecified type    5. Allergic rhinitis, unspecified seasonality, unspecified trigger    6. Rhinorrhea    7.  Continue Singulair, Flonase and azelastine nasal spray for seasonal allergies and allergic rhinitis.  8.  Continue albuterol as needed.  9.  Follow-up with allergist as scheduled.  10.  Follow-up with Dr. Villaseñor as scheduled, sooner if needed.        Follow Up   Return for Next scheduled follow up.  Patient was given instructions and counseling regarding her condition or for health maintenance advice. Please see specific information pulled into the AVS if appropriate.

## 2022-11-30 ENCOUNTER — LAB (OUTPATIENT)
Dept: LAB | Facility: HOSPITAL | Age: 82
End: 2022-11-30

## 2022-11-30 ENCOUNTER — TRANSCRIBE ORDERS (OUTPATIENT)
Dept: LAB | Facility: HOSPITAL | Age: 82
End: 2022-11-30

## 2022-11-30 DIAGNOSIS — E03.9 HYPOTHYROIDISM, UNSPECIFIED TYPE: Primary | ICD-10-CM

## 2022-11-30 DIAGNOSIS — E03.9 HYPOTHYROIDISM, UNSPECIFIED TYPE: ICD-10-CM

## 2022-11-30 LAB
T4 FREE SERPL-MCNC: 1.35 NG/DL (ref 0.93–1.7)
TSH SERPL DL<=0.05 MIU/L-ACNC: 3.35 UIU/ML (ref 0.27–4.2)

## 2022-11-30 PROCEDURE — 84443 ASSAY THYROID STIM HORMONE: CPT

## 2022-11-30 PROCEDURE — 36415 COLL VENOUS BLD VENIPUNCTURE: CPT

## 2022-11-30 PROCEDURE — 84439 ASSAY OF FREE THYROXINE: CPT

## 2023-01-20 ENCOUNTER — OFFICE VISIT (OUTPATIENT)
Dept: SURGERY | Facility: CLINIC | Age: 83
End: 2023-01-20
Payer: MEDICARE

## 2023-01-20 ENCOUNTER — PREP FOR SURGERY (OUTPATIENT)
Dept: OTHER | Facility: HOSPITAL | Age: 83
End: 2023-01-20
Payer: MEDICARE

## 2023-01-20 VITALS — BODY MASS INDEX: 29.53 KG/M2 | WEIGHT: 173 LBS | RESPIRATION RATE: 16 BRPM | HEIGHT: 64 IN

## 2023-01-20 DIAGNOSIS — R19.8 CHANGE IN BOWEL FUNCTION: Primary | ICD-10-CM

## 2023-01-20 DIAGNOSIS — Z80.0 FAMILY HISTORY OF COLON CANCER IN FATHER: ICD-10-CM

## 2023-01-20 PROCEDURE — 99202 OFFICE O/P NEW SF 15 MIN: CPT | Performed by: SURGERY

## 2023-01-20 RX ORDER — LOSARTAN POTASSIUM 25 MG/1
25 TABLET ORAL DAILY
COMMUNITY
Start: 2023-01-08

## 2023-01-20 NOTE — H&P (VIEW-ONLY)
Chief Complaint:  Constipation    Primary Care Provider: Yasmin Heck MD    Referring Provider:  Self referral    History of Present Illness  Cinthia Díaz is a 82 y.o. female self referral for change in bowel function.  Patient says that beginning about 2 weeks ago she started having constipation.  Her bowel movements are fairly regular and she did not have a bowel move for a few days and had to use a laxative.  After she had a bowel movement, she said her stool was narrow appearing than normal and flat appearing.  She has not had any nausea or vomiting or abdominal distention.  She is having bowel movements now but has to use laxatives and the appearance of her stool has not returned to normal.  No noticeable blood in the stool.  Patient last had a colonoscopy over 5 years ago.  The patient said her father  from colon cancer.    Allergies: Codeine, Morphine, Telmisartan, Ace inhibitors, and Molds & smuts    Outpatient Medications Marked as Taking for the 23 encounter (Office Visit) with Sánchez Torres MD   Medication Sig Dispense Refill   • albuterol sulfate  (90 Base) MCG/ACT inhaler Inhale 1 puff Every 4 (Four) Hours As Needed for Wheezing or Shortness of Air.     • aspirin 81 MG EC tablet Take 81 mg by mouth Daily.     • atorvastatin (LIPITOR) 20 MG tablet Take 20 mg by mouth Daily.     • azelastine (ASTELIN) 0.1 % nasal spray 2 sprays into the nostril(s) as directed by provider 2 (Two) Times a Day. Use in each nostril as directed     • azithromycin (Zithromax Z-Tarik) 250 MG tablet Take 2 tablets by mouth on day 1, then 1 tablet daily on days 2-5 6 tablet 0   • fexofenadine (Allegra Allergy) 180 MG tablet Take 1 tablet by mouth Daily. 30 tablet 6   • fluticasone (FLONASE) 50 MCG/ACT nasal spray 1 spray into the nostril(s) as directed by provider Daily.     • levothyroxine (SYNTHROID, LEVOTHROID) 75 MCG tablet Take 75 mcg by mouth Daily.     • loratadine (CLARITIN) 10 MG tablet Take  "10 mg by mouth Daily.     • metFORMIN (GLUCOPHAGE) 500 MG tablet Take 500 mg by mouth 2 (Two) Times a Day.     • methylPREDNISolone (MEDROL) 4 MG dose pack Take as directed on package instructions. 21 tablet 0   • metoprolol succinate XL (TOPROL-XL) 25 MG 24 hr tablet Take 25 mg by mouth Daily.     • montelukast (SINGULAIR) 10 MG tablet Take 10 mg by mouth.     • promethazine-dextromethorphan (PROMETHAZINE-DM) 6.25-15 MG/5ML syrup Take 5 mL by mouth At Night As Needed for Cough. 180 mL 0   • Vibegron (Gemtesa) 75 MG tablet Take 1 tablet by mouth Daily for 360 days. 90 tablet 3   • vitamin D (ERGOCALCIFEROL) 1.25 MG (17939 UT) capsule capsule Take 1,000 Units by mouth 2 (Two) Times a Day.         Past Medical History:   • Arthritis   • Diabetes mellitus (HCC)   • Disease of thyroid gland   • Hyperlipidemia   • Hypertension   • Pneumonia   • Seasonal allergies        Past Surgical History:   • BLADDER SURGERY   • CHOLECYSTECTOMY   • COLONOSCOPY   • HERNIA REPAIR   • HYSTERECTOMY       Family History:   Family History   Problem Relation Age of Onset   • Stroke Mother    • Diabetes Mother    • Cancer Father    • Colon cancer Father    • Other Brother         Social History:  Social History     Tobacco Use   • Smoking status: Never   • Smokeless tobacco: Never   Substance Use Topics   • Alcohol use: Never       Objective     Vital Signs:  Resp 16   Ht 162.6 cm (64\")   Wt 78.5 kg (173 lb)   BMI 29.70 kg/m²   • Constitutional: here alone, alert, no acute distress, reliable historian  • HENT:  NCAT, no visible deformities or lesions  • Eyes:  sclerae clear, conjunctivae clear, EOMI  • Neck:  normal appearance, no masses, trachea midline  • Respiratory:  breathing not labored, respiratory effort appears normal  • Cardiovascular:  heart regular rate  • Abdomen:  nondistended    • Skin and subcutaneous tissue:  no visible concerning rashes or lesions, no jaundice  • Musculoskeletal: moving all extremities symmetrically and " purposefully  • Neurologic:  no obvious motor or sensory deficits, normal gait, able to stand without difficulty, cerebellar function without any obvious abnormalities, alert & oriented x 3, speech clear  • Psychiatric:  judgment and insight intact, mood normal, affect appropriate, cooperative      Assessment:  Diagnoses and all orders for this visit:    1. Change in bowel function (Primary)    2. Family history of colon cancer in father        Plan:  Colonoscopy    Discussion: Indications, options, risks, benefits, and expected outcomes of planned surgery were discussed with the patient and she agrees to proceed.    Sánchez Torres MD  01/20/2023    Electronically signed by Sánchez Torres MD, 01/20/23, 2:20 PM EST.

## 2023-01-20 NOTE — PROGRESS NOTES
Chief Complaint:  Constipation    Primary Care Provider: Yasmin Heck MD    Referring Provider:  Self referral    History of Present Illness  Cinthia Díaz is a 82 y.o. female self referral for change in bowel function.  Patient says that beginning about 2 weeks ago she started having constipation.  Her bowel movements are fairly regular and she did not have a bowel move for a few days and had to use a laxative.  After she had a bowel movement, she said her stool was narrow appearing than normal and flat appearing.  She has not had any nausea or vomiting or abdominal distention.  She is having bowel movements now but has to use laxatives and the appearance of her stool has not returned to normal.  No noticeable blood in the stool.  Patient last had a colonoscopy over 5 years ago.  The patient said her father  from colon cancer.    Allergies: Codeine, Morphine, Telmisartan, Ace inhibitors, and Molds & smuts    Outpatient Medications Marked as Taking for the 23 encounter (Office Visit) with Sánchez Torres MD   Medication Sig Dispense Refill   • albuterol sulfate  (90 Base) MCG/ACT inhaler Inhale 1 puff Every 4 (Four) Hours As Needed for Wheezing or Shortness of Air.     • aspirin 81 MG EC tablet Take 81 mg by mouth Daily.     • atorvastatin (LIPITOR) 20 MG tablet Take 20 mg by mouth Daily.     • azelastine (ASTELIN) 0.1 % nasal spray 2 sprays into the nostril(s) as directed by provider 2 (Two) Times a Day. Use in each nostril as directed     • azithromycin (Zithromax Z-Tarik) 250 MG tablet Take 2 tablets by mouth on day 1, then 1 tablet daily on days 2-5 6 tablet 0   • fexofenadine (Allegra Allergy) 180 MG tablet Take 1 tablet by mouth Daily. 30 tablet 6   • fluticasone (FLONASE) 50 MCG/ACT nasal spray 1 spray into the nostril(s) as directed by provider Daily.     • levothyroxine (SYNTHROID, LEVOTHROID) 75 MCG tablet Take 75 mcg by mouth Daily.     • loratadine (CLARITIN) 10 MG tablet Take  "10 mg by mouth Daily.     • metFORMIN (GLUCOPHAGE) 500 MG tablet Take 500 mg by mouth 2 (Two) Times a Day.     • methylPREDNISolone (MEDROL) 4 MG dose pack Take as directed on package instructions. 21 tablet 0   • metoprolol succinate XL (TOPROL-XL) 25 MG 24 hr tablet Take 25 mg by mouth Daily.     • montelukast (SINGULAIR) 10 MG tablet Take 10 mg by mouth.     • promethazine-dextromethorphan (PROMETHAZINE-DM) 6.25-15 MG/5ML syrup Take 5 mL by mouth At Night As Needed for Cough. 180 mL 0   • Vibegron (Gemtesa) 75 MG tablet Take 1 tablet by mouth Daily for 360 days. 90 tablet 3   • vitamin D (ERGOCALCIFEROL) 1.25 MG (48318 UT) capsule capsule Take 1,000 Units by mouth 2 (Two) Times a Day.         Past Medical History:   • Arthritis   • Diabetes mellitus (HCC)   • Disease of thyroid gland   • Hyperlipidemia   • Hypertension   • Pneumonia   • Seasonal allergies        Past Surgical History:   • BLADDER SURGERY   • CHOLECYSTECTOMY   • COLONOSCOPY   • HERNIA REPAIR   • HYSTERECTOMY       Family History:   Family History   Problem Relation Age of Onset   • Stroke Mother    • Diabetes Mother    • Cancer Father    • Colon cancer Father    • Other Brother         Social History:  Social History     Tobacco Use   • Smoking status: Never   • Smokeless tobacco: Never   Substance Use Topics   • Alcohol use: Never       Objective     Vital Signs:  Resp 16   Ht 162.6 cm (64\")   Wt 78.5 kg (173 lb)   BMI 29.70 kg/m²   • Constitutional: here alone, alert, no acute distress, reliable historian  • HENT:  NCAT, no visible deformities or lesions  • Eyes:  sclerae clear, conjunctivae clear, EOMI  • Neck:  normal appearance, no masses, trachea midline  • Respiratory:  breathing not labored, respiratory effort appears normal  • Cardiovascular:  heart regular rate  • Abdomen:  nondistended    • Skin and subcutaneous tissue:  no visible concerning rashes or lesions, no jaundice  • Musculoskeletal: moving all extremities symmetrically and " purposefully  • Neurologic:  no obvious motor or sensory deficits, normal gait, able to stand without difficulty, cerebellar function without any obvious abnormalities, alert & oriented x 3, speech clear  • Psychiatric:  judgment and insight intact, mood normal, affect appropriate, cooperative      Assessment:  Diagnoses and all orders for this visit:    1. Change in bowel function (Primary)    2. Family history of colon cancer in father        Plan:  Colonoscopy    Discussion: Indications, options, risks, benefits, and expected outcomes of planned surgery were discussed with the patient and she agrees to proceed.    Sánchez Torres MD  01/20/2023    Electronically signed by Sánchez Torres MD, 01/20/23, 2:20 PM EST.

## 2023-01-25 ENCOUNTER — ANESTHESIA EVENT (OUTPATIENT)
Dept: GASTROENTEROLOGY | Facility: HOSPITAL | Age: 83
End: 2023-01-25
Payer: MEDICARE

## 2023-01-25 ENCOUNTER — HOSPITAL ENCOUNTER (OUTPATIENT)
Facility: HOSPITAL | Age: 83
Setting detail: HOSPITAL OUTPATIENT SURGERY
Discharge: HOME | End: 2023-01-25
Attending: SURGERY | Admitting: SURGERY
Payer: MEDICARE

## 2023-01-25 ENCOUNTER — ANESTHESIA (OUTPATIENT)
Dept: GASTROENTEROLOGY | Facility: HOSPITAL | Age: 83
End: 2023-01-25
Payer: MEDICARE

## 2023-01-25 VITALS
WEIGHT: 169.75 LBS | DIASTOLIC BLOOD PRESSURE: 75 MMHG | TEMPERATURE: 98.1 F | OXYGEN SATURATION: 93 % | RESPIRATION RATE: 20 BRPM | HEART RATE: 100 BPM | BODY MASS INDEX: 29.14 KG/M2 | SYSTOLIC BLOOD PRESSURE: 164 MMHG

## 2023-01-25 DIAGNOSIS — R19.8 CHANGE IN BOWEL FUNCTION: ICD-10-CM

## 2023-01-25 LAB — GLUCOSE BLDC GLUCOMTR-MCNC: 128 MG/DL (ref 70–99)

## 2023-01-25 PROCEDURE — 88305 TISSUE EXAM BY PATHOLOGIST: CPT | Performed by: SURGERY

## 2023-01-25 PROCEDURE — 25010000002 PROPOFOL 10 MG/ML EMULSION: Performed by: NURSE ANESTHETIST, CERTIFIED REGISTERED

## 2023-01-25 PROCEDURE — 82962 GLUCOSE BLOOD TEST: CPT

## 2023-01-25 RX ORDER — LIDOCAINE HYDROCHLORIDE 20 MG/ML
INJECTION, SOLUTION EPIDURAL; INFILTRATION; INTRACAUDAL; PERINEURAL AS NEEDED
Status: DISCONTINUED | OUTPATIENT
Start: 2023-01-25 | End: 2023-01-25 | Stop reason: SURG

## 2023-01-25 RX ORDER — SODIUM CHLORIDE, SODIUM LACTATE, POTASSIUM CHLORIDE, CALCIUM CHLORIDE 600; 310; 30; 20 MG/100ML; MG/100ML; MG/100ML; MG/100ML
30 INJECTION, SOLUTION INTRAVENOUS CONTINUOUS
Status: DISCONTINUED | OUTPATIENT
Start: 2023-01-25 | End: 2023-01-25 | Stop reason: HOSPADM

## 2023-01-25 RX ADMIN — LIDOCAINE HYDROCHLORIDE 40 MG: 20 INJECTION, SOLUTION EPIDURAL; INFILTRATION; INTRACAUDAL; PERINEURAL at 09:13

## 2023-01-25 RX ADMIN — SODIUM CHLORIDE, POTASSIUM CHLORIDE, SODIUM LACTATE AND CALCIUM CHLORIDE 30 ML/HR: 600; 310; 30; 20 INJECTION, SOLUTION INTRAVENOUS at 08:10

## 2023-01-25 RX ADMIN — PROPOFOL 250 MCG/KG/MIN: 10 INJECTION, EMULSION INTRAVENOUS at 09:13

## 2023-01-25 NOTE — ANESTHESIA POSTPROCEDURE EVALUATION
Patient: Cinthia Díaz    Procedure Summary     Date: 01/25/23 Room / Location: [facility] ENDOSCOPY 1 / [facility] ENDOSCOPY    Anesthesia Start: 0911 Anesthesia Stop: 0938    Procedure: COLONOSCOPY with polypectomy with cold snare Diagnosis:       Change in bowel function      (Change in bowel function [R19.8])    Surgeons: Sánchez Torres MD Provider: Mikel Luna MD    Anesthesia Type: general ASA Status: 2          Anesthesia Type: general    Vitals  Vitals Value Taken Time   BP 97/74 01/25/23 0958   Temp 36.7 °C (98.1 °F) 01/25/23 0950   Pulse 86 01/25/23 0959   Resp 20 01/25/23 0950   SpO2 95 % 01/25/23 0959   Vitals shown include unvalidated device data.        Post Anesthesia Care and Evaluation    Patient location during evaluation: bedside  Patient participation: complete - patient participated  Level of consciousness: awake  Pain management: adequate    Airway patency: patent  Anesthetic complications: No anesthetic complications  PONV Status: none  Cardiovascular status: acceptable and stable  Respiratory status: acceptable  Hydration status: acceptable    Comments: An Anesthesiologist personally participated in the most demanding procedures (including induction and emergence if applicable) in the anesthesia plan, monitored the course of anesthesia administration at frequent intervals and remained physically present and available for immediate diagnosis and treatment of emergencies.

## 2023-01-25 NOTE — ANESTHESIA PREPROCEDURE EVALUATION
Anesthesia Evaluation     Patient summary reviewed and Nursing notes reviewed   no history of anesthetic complications:  NPO Solid Status: > 8 hours  NPO Liquid Status: > 2 hours           Airway   Mallampati: II  TM distance: >3 FB  Neck ROM: full  No difficulty expected  Dental      Pulmonary - normal exam    breath sounds clear to auscultation  (+) pneumonia ,   Cardiovascular - normal exam  Exercise tolerance: good (4-7 METS)    Rhythm: regular  Rate: normal    (+) hypertension, hyperlipidemia,       Neuro/Psych- negative ROS  GI/Hepatic/Renal/Endo    (+)  hiatal hernia,  diabetes mellitus type 2, thyroid problem     Musculoskeletal     Abdominal    Substance History - negative use     OB/GYN negative ob/gyn ROS         Other   arthritis,      ROS/Med Hx Other: PAT Nursing Notes unavailable.                   Anesthesia Plan    ASA 2     general     (Total IV Anesthesia    Patient understands anesthesia not responsible for dental damage.  )  intravenous induction     Anesthetic plan, risks, benefits, and alternatives have been provided, discussed and informed consent has been obtained with: patient.    Plan discussed with CRNA.        CODE STATUS:

## 2023-01-27 LAB
CYTO UR: NORMAL
LAB AP CASE REPORT: NORMAL
LAB AP CLINICAL INFORMATION: NORMAL
PATH REPORT.FINAL DX SPEC: NORMAL
PATH REPORT.GROSS SPEC: NORMAL

## 2023-02-22 ENCOUNTER — TELEPHONE (OUTPATIENT)
Dept: UROLOGY | Facility: CLINIC | Age: 83
End: 2023-02-22
Payer: MEDICARE

## 2023-02-22 DIAGNOSIS — N32.81 OAB (OVERACTIVE BLADDER): Primary | ICD-10-CM

## 2023-02-22 DIAGNOSIS — R35.1 NOCTURIA: ICD-10-CM

## 2023-02-22 RX ORDER — SOLIFENACIN SUCCINATE 5 MG/1
5 TABLET, FILM COATED ORAL DAILY
Qty: 30 TABLET | Refills: 2 | Status: SHIPPED | OUTPATIENT
Start: 2023-02-22 | End: 2023-03-31

## 2023-02-22 NOTE — TELEPHONE ENCOUNTER
Spoke with patient informing her that in order to get the cost down for Gemtesa, she will need to try a few more medications. Vesicare was sent to her pharmacy. She will call back with any issues or if this med does not work.

## 2023-02-22 NOTE — TELEPHONE ENCOUNTER
Caller: ERWIN VALADEZ     Relationship: SELF     Best call back number: [phone]    INCOMING CALL FROM PT. PT WANTING TO SCHEDULE AN APPT WITH DR. LYNN FOR HER BLADDER LEAKAGE. PT SAYS HER BLADDER LEAKAGE HAS BEEN WORSENING. OFFERED NEXT AVAILABLE WITH DR. LYNN (MAY) AND [place] (April) PT SAID SHE NEEDS SOMETHING SOONER THAN THAT.     PT SAID THE GEMTESA SAMPLES SHE TRIED WORKED FOR HER LAST TIME.

## 2023-03-31 DIAGNOSIS — N30.01 ACUTE CYSTITIS WITH HEMATURIA: Primary | ICD-10-CM

## 2023-03-31 RX ORDER — TOLTERODINE 4 MG/1
4 CAPSULE, EXTENDED RELEASE ORAL DAILY
Qty: 30 CAPSULE | Refills: 11 | Status: SHIPPED | OUTPATIENT
Start: 2023-03-31

## 2023-04-04 ENCOUNTER — LAB (OUTPATIENT)
Dept: LAB | Facility: HOSPITAL | Age: 83
End: 2023-04-04
Payer: MEDICARE

## 2023-04-04 ENCOUNTER — TRANSCRIBE ORDERS (OUTPATIENT)
Dept: ADMINISTRATIVE | Facility: HOSPITAL | Age: 83
End: 2023-04-04
Payer: MEDICARE

## 2023-04-04 DIAGNOSIS — E78.5 HYPERLIPIDEMIA, UNSPECIFIED HYPERLIPIDEMIA TYPE: ICD-10-CM

## 2023-04-04 DIAGNOSIS — E78.5 HYPERLIPIDEMIA, UNSPECIFIED HYPERLIPIDEMIA TYPE: Primary | ICD-10-CM

## 2023-04-04 LAB
ALBUMIN SERPL-MCNC: 4.3 G/DL (ref 3.5–5.2)
ALBUMIN/GLOB SERPL: 2 G/DL
ALP SERPL-CCNC: 62 U/L (ref 39–117)
ALT SERPL W P-5'-P-CCNC: 21 U/L (ref 1–33)
ANION GAP SERPL CALCULATED.3IONS-SCNC: 11.2 MMOL/L (ref 5–15)
AST SERPL-CCNC: 18 U/L (ref 1–32)
BILIRUB SERPL-MCNC: 0.9 MG/DL (ref 0–1.2)
BUN SERPL-MCNC: 15 MG/DL (ref 8–23)
BUN/CREAT SERPL: 21.7 (ref 7–25)
CALCIUM SPEC-SCNC: 9.8 MG/DL (ref 8.6–10.5)
CHLORIDE SERPL-SCNC: 104 MMOL/L (ref 98–107)
CHOLEST SERPL-MCNC: 164 MG/DL (ref 0–200)
CO2 SERPL-SCNC: 26.8 MMOL/L (ref 22–29)
CREAT SERPL-MCNC: 0.69 MG/DL (ref 0.57–1)
EGFRCR SERPLBLD CKD-EPI 2021: 87.3 ML/MIN/1.73
GLOBULIN UR ELPH-MCNC: 2.1 GM/DL
GLUCOSE SERPL-MCNC: 123 MG/DL (ref 65–99)
HDLC SERPL-MCNC: 59 MG/DL (ref 40–60)
LDLC SERPL CALC-MCNC: 83 MG/DL (ref 0–100)
LDLC/HDLC SERPL: 1.35 {RATIO}
POTASSIUM SERPL-SCNC: 4.3 MMOL/L (ref 3.5–5.2)
PROT SERPL-MCNC: 6.4 G/DL (ref 6–8.5)
SODIUM SERPL-SCNC: 142 MMOL/L (ref 136–145)
TRIGL SERPL-MCNC: 126 MG/DL (ref 0–150)
VLDLC SERPL-MCNC: 22 MG/DL (ref 5–40)

## 2023-04-04 PROCEDURE — 80061 LIPID PANEL: CPT

## 2023-04-04 PROCEDURE — 36415 COLL VENOUS BLD VENIPUNCTURE: CPT

## 2023-04-04 PROCEDURE — 80053 COMPREHEN METABOLIC PANEL: CPT

## 2023-05-02 ENCOUNTER — TELEPHONE (OUTPATIENT)
Dept: PULMONOLOGY | Facility: CLINIC | Age: 83
End: 2023-05-02

## 2023-05-02 ENCOUNTER — LAB (OUTPATIENT)
Dept: LAB | Facility: HOSPITAL | Age: 83
End: 2023-05-02
Payer: MEDICARE

## 2023-05-02 ENCOUNTER — OFFICE VISIT (OUTPATIENT)
Dept: PULMONOLOGY | Facility: CLINIC | Age: 83
End: 2023-05-02
Payer: MEDICARE

## 2023-05-02 ENCOUNTER — APPOINTMENT (OUTPATIENT)
Dept: LAB | Facility: HOSPITAL | Age: 83
End: 2023-05-02
Payer: MEDICARE

## 2023-05-02 VITALS
WEIGHT: 176.8 LBS | OXYGEN SATURATION: 97 % | DIASTOLIC BLOOD PRESSURE: 74 MMHG | HEIGHT: 64 IN | BODY MASS INDEX: 30.19 KG/M2 | TEMPERATURE: 98 F | RESPIRATION RATE: 16 BRPM | SYSTOLIC BLOOD PRESSURE: 152 MMHG | HEART RATE: 82 BPM

## 2023-05-02 DIAGNOSIS — R06.00 DYSPNEA, UNSPECIFIED TYPE: ICD-10-CM

## 2023-05-02 DIAGNOSIS — U07.1 COVID-19 VIRUS DETECTED: ICD-10-CM

## 2023-05-02 DIAGNOSIS — R91.1 LUNG NODULE: ICD-10-CM

## 2023-05-02 DIAGNOSIS — J30.2 SEASONAL ALLERGIES: ICD-10-CM

## 2023-05-02 DIAGNOSIS — T78.40XD ALLERGY, SUBSEQUENT ENCOUNTER: ICD-10-CM

## 2023-05-02 DIAGNOSIS — J41.1 BRONCHITIS, MUCOPURULENT RECURRENT: ICD-10-CM

## 2023-05-02 DIAGNOSIS — R06.2 WHEEZING: ICD-10-CM

## 2023-05-02 DIAGNOSIS — R05.9 COUGH, UNSPECIFIED TYPE: ICD-10-CM

## 2023-05-02 DIAGNOSIS — T78.40XD ALLERGY, SUBSEQUENT ENCOUNTER: Primary | ICD-10-CM

## 2023-05-02 LAB
ALBUMIN SERPL-MCNC: 4.4 G/DL (ref 3.5–5.2)
ALBUMIN/GLOB SERPL: 2 G/DL
ALP SERPL-CCNC: 72 U/L (ref 39–117)
ALT SERPL W P-5'-P-CCNC: 22 U/L (ref 1–33)
ANION GAP SERPL CALCULATED.3IONS-SCNC: 13.4 MMOL/L (ref 5–15)
AST SERPL-CCNC: 21 U/L (ref 1–32)
BASOPHILS # BLD AUTO: 0.02 10*3/MM3 (ref 0–0.2)
BASOPHILS NFR BLD AUTO: 0.2 % (ref 0–1.5)
BILIRUB SERPL-MCNC: 0.4 MG/DL (ref 0–1.2)
BUN SERPL-MCNC: 19 MG/DL (ref 8–23)
BUN/CREAT SERPL: 25.7 (ref 7–25)
CALCIUM SPEC-SCNC: 9.7 MG/DL (ref 8.6–10.5)
CHLORIDE SERPL-SCNC: 105 MMOL/L (ref 98–107)
CO2 SERPL-SCNC: 21.6 MMOL/L (ref 22–29)
CREAT SERPL-MCNC: 0.74 MG/DL (ref 0.57–1)
DEPRECATED RDW RBC AUTO: 43 FL (ref 37–54)
EGFRCR SERPLBLD CKD-EPI 2021: 81.4 ML/MIN/1.73
EOSINOPHIL # BLD AUTO: 0.04 10*3/MM3 (ref 0–0.4)
EOSINOPHIL NFR BLD AUTO: 0.4 % (ref 0.3–6.2)
ERYTHROCYTE [DISTWIDTH] IN BLOOD BY AUTOMATED COUNT: 13.3 % (ref 12.3–15.4)
EXPIRATION DATE: ABNORMAL
GLOBULIN UR ELPH-MCNC: 2.2 GM/DL
GLUCOSE SERPL-MCNC: 119 MG/DL (ref 65–99)
HCT VFR BLD AUTO: 41.2 % (ref 34–46.6)
HGB BLD-MCNC: 14.4 G/DL (ref 12–15.9)
IMM GRANULOCYTES # BLD AUTO: 0.03 10*3/MM3 (ref 0–0.05)
IMM GRANULOCYTES NFR BLD AUTO: 0.3 % (ref 0–0.5)
INTERNAL CONTROL: ABNORMAL
LYMPHOCYTES # BLD AUTO: 1.78 10*3/MM3 (ref 0.7–3.1)
LYMPHOCYTES NFR BLD AUTO: 18.5 % (ref 19.6–45.3)
Lab: ABNORMAL
MCH RBC QN AUTO: 30.8 PG (ref 26.6–33)
MCHC RBC AUTO-ENTMCNC: 35 G/DL (ref 31.5–35.7)
MCV RBC AUTO: 88.2 FL (ref 79–97)
MONOCYTES # BLD AUTO: 0.97 10*3/MM3 (ref 0.1–0.9)
MONOCYTES NFR BLD AUTO: 10.1 % (ref 5–12)
NEUTROPHILS NFR BLD AUTO: 6.78 10*3/MM3 (ref 1.7–7)
NEUTROPHILS NFR BLD AUTO: 70.5 % (ref 42.7–76)
NRBC BLD AUTO-RTO: 0 /100 WBC (ref 0–0.2)
PLATELET # BLD AUTO: 221 10*3/MM3 (ref 140–450)
PMV BLD AUTO: 11.1 FL (ref 6–12)
POTASSIUM SERPL-SCNC: 4.1 MMOL/L (ref 3.5–5.2)
PROT SERPL-MCNC: 6.6 G/DL (ref 6–8.5)
RBC # BLD AUTO: 4.67 10*6/MM3 (ref 3.77–5.28)
SARS-COV-2 AG UPPER RESP QL IA.RAPID: DETECTED
SODIUM SERPL-SCNC: 140 MMOL/L (ref 136–145)
WBC NRBC COR # BLD: 9.62 10*3/MM3 (ref 3.4–10.8)

## 2023-05-02 PROCEDURE — 85025 COMPLETE CBC W/AUTO DIFF WBC: CPT

## 2023-05-02 PROCEDURE — 80053 COMPREHEN METABOLIC PANEL: CPT

## 2023-05-02 RX ORDER — PREDNISONE 20 MG/1
40 TABLET ORAL DAILY
Qty: 14 TABLET | Refills: 0 | Status: SHIPPED | OUTPATIENT
Start: 2023-05-02

## 2023-05-02 RX ORDER — DICLOFENAC SODIUM 75 MG/1
TABLET, DELAYED RELEASE ORAL
COMMUNITY
Start: 2023-04-24

## 2023-05-02 RX ORDER — AZITHROMYCIN 250 MG/1
TABLET, FILM COATED ORAL
Qty: 6 TABLET | Refills: 0 | Status: SHIPPED | OUTPATIENT
Start: 2023-05-02

## 2023-05-02 NOTE — TELEPHONE ENCOUNTER
Patient called back and stated that her COVID test was POS. And she is wanting a call back to see what she needs to do next. She said they did not do a lot of her test and she is confused about what to do next.      Thanks

## 2023-05-02 NOTE — PROGRESS NOTES
Primary Care Provider  Yasmin Heck MD     Referring Provider  No ref. provider found     Chief Complaint  Cough, Shortness of Breath, Wheezing, lung nodules, and Follow-up    Subjective          History of Presenting Illness  Patient is an 82-year-old female, patient of Dr. Ravi who presents for management of a pulmonary nodule and has a history of recurrent mucopurulent bronchitis who presents for an acute visit today.  Patient states that over the last 2 days she has been having increasing shortness of breath, productive cough, and wheezing.  Patient states that she is also experiencing chills and a subjective fever.  Patient states that she did go to a birthday party a week ago and she did have a houseguest stay with her for a week.  Patient denies any recent travel.  Patient states that she is up-to-date with her COVID, pneumonia, and flu vaccinations.  Patient states that she is under the care of an allergist and is receiving allergy shots.  Patient states that she does have Flonase, Astelin nasal spray, and Allegra that she can take for seasonal allergies.  Patient states that she is currently not taking Singulair until she follows up with her allergist. Patient denies night sweats, swollen glands in the head and neck, unintentional weight loss, hemoptysis,dysphagia, chest pain, palpitations, chest tightness, abdominal pain, nausea, vomiting, and diarrhea.  Patient also denies any myalgias, changes in sense of taste and/or smell, sore throat, any other coronavirus or flu-like symptoms.  Patient denies any leg swelling, orthopnea, paroxysmal nocturnal dyspnea.  Patient is able to perform activities of daily living.        Review of Systems   Constitutional: Positive for chills, fatigue and fever (subjective yesterday per pt report). Negative for activity change, appetite change, diaphoresis, unexpected weight gain and unexpected weight loss.        Negative for Insomnia   HENT: Positive for  rhinorrhea. Negative for congestion (Nasal), mouth sores, nosebleeds, postnasal drip, sore throat, swollen glands and trouble swallowing.         Negative for Thrush  Negative for Hoarseness  Negative for Allergies/Hay Fever  Negative for Recent Head injury  Negative for Ear Fullness  Negative for Nasal or Sinus pain  Negative for Dry lips  Negative for Nasal discharge   Respiratory: Positive for cough, shortness of breath and wheezing. Negative for apnea and chest tightness.         Negative for Hemoptysis  Negative for Pleuritic pain   Cardiovascular: Negative for chest pain, palpitations and leg swelling.        Negative for Claudication  Negative for Cyanosis  Negative for Dyspnea on exertion   Gastrointestinal: Negative for abdominal pain, diarrhea, nausea, vomiting and GERD.   Musculoskeletal: Negative for joint swelling and myalgias.        Negative for Joint pain  Negative for Joint stiffness   Skin: Negative for color change, dry skin, pallor and rash.   Neurological: Negative for syncope, weakness and headache.   Hematological: Negative for adenopathy. Does not bruise/bleed easily.        Family History   Problem Relation Age of Onset   • Stroke Mother    • Diabetes Mother    • Cancer Father    • Colon cancer Father    • Other Brother    • Malig Hyperthermia Neg Hx         Social History     Socioeconomic History   • Marital status:    Tobacco Use   • Smoking status: Never     Passive exposure: Past   • Smokeless tobacco: Never   Vaping Use   • Vaping Use: Never used   Substance and Sexual Activity   • Alcohol use: Never   • Drug use: Never   • Sexual activity: Defer        Past Medical History:   Diagnosis Date   • Arthritis    • Diabetes mellitus    • Disease of thyroid gland    • Hyperlipidemia    • Hypertension    • Pneumonia    • Seasonal allergies         Immunization History   Administered Date(s) Administered   • COVID-19 (MODERNA) 1st,2nd,3rd Dose Monovalent 01/13/2021, 02/09/2021,  11/06/2021   • COVID-19 (MODERNA) Monovalent Original Booster 07/06/2022   • Flu Vaccine Quad PF >36MO 10/16/2015, 09/25/2018   • FluLaval/Fluzone >6mos 10/16/2015, 09/25/2018   • Fluad Quad 65+ 09/27/2020   • Fluzone High-Dose 65+yrs 11/06/2021, 10/06/2022   • Hepatitis B 06/25/2001, 07/25/2001, 11/27/2001   • Pneumococcal Conjugate 20-Valent (PCV20) 08/10/2022   • Tdap 03/26/2020       Allergies   Allergen Reactions   • Codeine Nausea And Vomiting   • Morphine Nausea And Vomiting   • Telmisartan Other (See Comments)     ALL ARB's, causes cough    • Ace Inhibitors Cough   • Molds & Smuts Other (See Comments)     Seasonal allergies           Current Outpatient Medications:   •  aspirin 81 MG EC tablet, Take 1 tablet by mouth Daily., Disp: , Rfl:   •  atorvastatin (LIPITOR) 20 MG tablet, Take 1 tablet by mouth Daily., Disp: , Rfl:   •  azelastine (ASTELIN) 0.1 % nasal spray, 2 sprays into the nostril(s) as directed by provider 2 (Two) Times a Day. Use in each nostril as directed, Disp: , Rfl:   •  diclofenac (VOLTAREN) 75 MG EC tablet, TAKE 1 TABLET BY MOUTH 2 TIMES A DAY *DO NOT CRUSH,CHEW,OR SPLIT*, Disp: , Rfl:   •  fexofenadine (Allegra Allergy) 180 MG tablet, Take 1 tablet by mouth Daily. (Patient taking differently: Take 1 tablet by mouth Daily As Needed.), Disp: 30 tablet, Rfl: 6  •  fluticasone (FLONASE) 50 MCG/ACT nasal spray, 1 spray into the nostril(s) as directed by provider Daily As Needed., Disp: , Rfl:   •  levothyroxine (SYNTHROID, LEVOTHROID) 75 MCG tablet, Take 1 tablet by mouth Daily., Disp: , Rfl:   •  metFORMIN (GLUCOPHAGE) 500 MG tablet, Take 1 tablet by mouth 2 (Two) Times a Day., Disp: , Rfl:   •  metoprolol succinate XL (TOPROL-XL) 25 MG 24 hr tablet, Take 1 tablet by mouth Daily., Disp: , Rfl:   •  tolterodine LA (DETROL LA) 4 MG 24 hr capsule, Take 1 capsule by mouth Daily., Disp: 30 capsule, Rfl: 11  •  azithromycin (ZITHROMAX) 250 MG tablet, Take 2 by mouth today then 1 daily for 4 days,  "Disp: 6 tablet, Rfl: 0  •  cyanocobalamin (VITAMIN B-12) 1000 MCG tablet, Take 1 tablet by mouth Daily., Disp: , Rfl:   •  losartan (COZAAR) 25 MG tablet, Take 25 mg by mouth Daily. (Patient not taking: Reported on 5/2/2023), Disp: , Rfl:   •  montelukast (SINGULAIR) 10 MG tablet, Take 10 mg by mouth Every Night. (Patient not taking: Reported on 5/2/2023), Disp: , Rfl:   •  Nirmatrelvir&Ritonavir 300/100 (PAXLOVID) 20 x 150 MG & 10 x 100MG tablet therapy pack tablet, Take 3 tablets by mouth 2 (Two) Times a Day for 5 days., Disp: 30 tablet, Rfl: 0  •  predniSONE (DELTASONE) 20 MG tablet, Take 2 tablets by mouth Daily., Disp: 14 tablet, Rfl: 0     Objective     Physical Exam  Vital Signs:   WDWN, Alert, NAD.    HEENT:  PERRL, EOMI.  OP, nares clear, no sinus tenderness  Neck:  Supple, no JVD, no thyromegaly.  Lymph: no axillary, cervical, supraclavicular lymphadenopathy noted bilaterally  Chest: Mildly decreased breath sounds throughout with some expiratory wheezes.  Normal work of breathing noted.  Patient is able speak full sentences without difficulty.  CV: RRR, no MGR, pulses 2+, equal.  Abd:  Soft, NT, ND, + BS, no HSM  EXT:  no clubbing, no cyanosis, no edema, no joint tenderness  Neuro:  A&Ox3, CN grossly intact, no focal deficits.  Skin: No rashes or lesions noted.    /74 (BP Location: Right arm, Patient Position: Sitting, Cuff Size: Adult)   Pulse 82   Temp 98 °F (36.7 °C) (Tympanic)   Resp 16   Ht 162.6 cm (64.02\")   Wt 80.2 kg (176 lb 12.8 oz)   SpO2 97% Comment: room air  BMI 30.33 kg/m²         Result Review :   I have reviewed FRANCK Quiles last office visit note.    Procedures:           Assessment and Plan      Assessment:  1.  COVID-19.  Positive in the office today.    2.  Mucopurulent recurrent bronchitis.  3. Dyspnea.  4. Cough.  5. Wheezing.  6. Lung nodule.  7. Allergic rhinitis.  8. Seasonal allergies.  9. Never smoker.      Plan:  1.  COVID-19 testing came back positive in " the office today.  We will start patient on Paxlovid.  Patient is advised to hold Lipitor while taking Paxlovid.  How to take medications and possible side effects of medications discussed with the patient.  Patient verbalized understanding and compliance.  Patient is advised to follow CDC recommendations and to quarantine for 7-10 days.  Flu testing not available in the office today and therefore cannot be performed.    2.  For bronchitis and wheezing, will start patient on a Z-Tarik and a prednisone burst.  Expectations and course of treatment discussed with the patient. How to take medications and possible side effects of medications discussed with the patient. Patient verbalized understanding and compliance.   3.  Will order a chest x-ray, CBC, CMP, respiratory culture, and AFB culture for further evaluation.  4.  Continue Singulair, Flonase nasal spray, and Astelin nasal spray for seasonal allergies/allergic rhinitis.  Follow-up with allergist as scheduled.  5.  Vaccination status: patient reports they are up-to-date with flu, pneumonia, and Covid vaccines.  Patient is advised to continue to follow CDC recommendations such as social distancing wearing a mask and washing hands for at least 20 seconds.  6.  Smoking status: never smoker.  7.  Patient to call the office, 911, or go to the ER with new or worsening symptoms.  8.  Follow-up in 1 to 2 weeks, sooner if needed.              Follow Up   Return for 1-2 weeks.  Patient was given instructions and counseling regarding her condition or for health maintenance advice. Please see specific information pulled into the AVS if appropriate.

## 2023-05-02 NOTE — TELEPHONE ENCOUNTER
Spoke to patient she is aware of Covid results. Per Stefani patient is to hold her atorvastatin while taking paxlovid.

## 2023-05-12 ENCOUNTER — OFFICE VISIT (OUTPATIENT)
Dept: PULMONOLOGY | Facility: CLINIC | Age: 83
End: 2023-05-12
Payer: MEDICARE

## 2023-05-12 ENCOUNTER — HOSPITAL ENCOUNTER (OUTPATIENT)
Dept: GENERAL RADIOLOGY | Facility: HOSPITAL | Age: 83
Discharge: HOME | End: 2023-05-12
Payer: MEDICARE

## 2023-05-12 VITALS
HEIGHT: 64 IN | HEART RATE: 109 BPM | OXYGEN SATURATION: 97 % | TEMPERATURE: 98.2 F | BODY MASS INDEX: 29.64 KG/M2 | DIASTOLIC BLOOD PRESSURE: 66 MMHG | WEIGHT: 173.6 LBS | SYSTOLIC BLOOD PRESSURE: 141 MMHG | RESPIRATION RATE: 18 BRPM

## 2023-05-12 DIAGNOSIS — R06.09 DYSPNEA ON EXERTION: ICD-10-CM

## 2023-05-12 DIAGNOSIS — U07.1 COVID-19 VIRUS DETECTED: ICD-10-CM

## 2023-05-12 DIAGNOSIS — U07.1 COVID-19 VIRUS DETECTED: Primary | ICD-10-CM

## 2023-05-12 DIAGNOSIS — J30.2 SEASONAL ALLERGIES: ICD-10-CM

## 2023-05-12 DIAGNOSIS — R06.2 WHEEZING: ICD-10-CM

## 2023-05-12 DIAGNOSIS — J30.9 ALLERGIC RHINITIS, UNSPECIFIED SEASONALITY, UNSPECIFIED TRIGGER: ICD-10-CM

## 2023-05-12 DIAGNOSIS — J34.89 RHINORRHEA: ICD-10-CM

## 2023-05-12 DIAGNOSIS — R05.1 ACUTE COUGH: ICD-10-CM

## 2023-05-12 DIAGNOSIS — R07.89 CHEST HEAVINESS: ICD-10-CM

## 2023-05-12 PROCEDURE — 71046 X-RAY EXAM CHEST 2 VIEWS: CPT

## 2023-05-12 RX ORDER — AMLODIPINE BESYLATE 2.5 MG/1
2.5 TABLET ORAL DAILY
COMMUNITY

## 2023-05-12 RX ORDER — COVID-19 MOLECULAR TEST ASSAY
KIT MISCELLANEOUS
COMMUNITY
Start: 2023-05-02

## 2023-05-12 NOTE — PROGRESS NOTES
"Primary Care Provider  Yasmin Heck MD   Referring Provider  No ref. provider found      Patient Complaint  Follow-up (Covid follow-up), Lung Nodule, Bronchitis, Nasal Congestion (Runny Nose), and Chest Pain (Patient complains of chest heaviness)      Subjective          Cinthia Díaz presents to [facility]      History of Presenting Illness  Cinthia Díaz is a 82 y.o. female with history of recent COVID infection, seasonal allergies/allergic rhinitis, here for acute visit.    Patient presents today with persistent symptoms after COVID infection, mainly runny nose and fatigue, and is worried that she could have pneumonia.  She tested positive for COVID 10 days ago during her appointment 5/2/2023 with Stefani, at which time she felt very acutely ill with cough and wheezing.  Patient was prescribed Paxlovid as well as Z-Tarik and prednisone burst at that time.  Today patient called our clinic stating that she is still testing positive for COVID and that she is still not feeling back to normal.  She was sent for CXR prior to her appointment to rule out COVID pneumonia, CXR showed no active disease or evidence of pneumonia.  Patient reports having had very bad diarrhea during her acute COVID illness, which has since improved.  She also had loss of appetite, although this is improving as well.  Today, she states she feels \"wiped out\" and fatigued.  When asked about chest heaviness she had reported earlier, patient said it is very mild if any.  She denies any shortness of breath at all, no cough.  She denies any fevers or chills.  Patient does not currently use any inhalers, but she does use Singulair, Allegra, Flonase, and Astelin nasal sprays for seasonal allergies and allergic rhinitis.  She also sees an allergist regularly for allergy shots.  Patient is a never smoker.  She denies any hemoptysis, swollen lymph nodes, weight loss, or night sweats.  " Patient is able to perform ADLs without difficulty.  I have personally reviewed the review of systems, past family, social, medical and surgical histories; and agree with their findings.      Review of Systems    Review of Systems   Constitutional: Positive for appetite change (poor appetite) and fatigue. Negative for activity change, chills, fever, unexpected weight gain and unexpected weight loss.   HENT: Positive for rhinorrhea. Negative for congestion, ear discharge, ear pain, mouth sores, postnasal drip, sinus pressure, sore throat, swollen glands and trouble swallowing.    Eyes: Negative for blurred vision, pain, discharge, itching and visual disturbance.   Respiratory: Negative for apnea, cough, chest tightness, shortness of breath, wheezing and stridor.    Cardiovascular: Negative for chest pain, palpitations and leg swelling.   Gastrointestinal: Negative for abdominal distention, abdominal pain, constipation, diarrhea, nausea, vomiting, GERD and indigestion.   Musculoskeletal: Negative for arthralgias, joint swelling and myalgias.   Skin: Negative for color change.   Neurological: Negative for dizziness, weakness, light-headedness and headache.      Sleep: Negative for Excessive daytime sleepiness  Negative for morning headaches  Negative for Snoring      Family History   Problem Relation Age of Onset   • Stroke Mother    • Diabetes Mother    • Cancer Father    • Colon cancer Father    • Other Brother    • Malig Hyperthermia Neg Hx         Social History     Socioeconomic History   • Marital status:    Tobacco Use   • Smoking status: Never     Passive exposure: Past   • Smokeless tobacco: Never   Vaping Use   • Vaping Use: Never used   Substance and Sexual Activity   • Alcohol use: Never   • Drug use: Never   • Sexual activity: Defer        Past Medical History:   Diagnosis Date   • Arthritis    • Diabetes mellitus    • Disease of thyroid gland    • Hyperlipidemia    • Hypertension    • Pneumonia     • Seasonal allergies         Immunization History   Administered Date(s) Administered   • COVID-19 (MODERNA) 1st,2nd,3rd Dose Monovalent 01/13/2021, 02/09/2021, 11/06/2021   • COVID-19 (MODERNA) Monovalent Original Booster 07/06/2022   • Flu Vaccine Quad PF >36MO 10/16/2015, 09/25/2018   • FluLaval/Fluzone >6mos 10/16/2015, 09/25/2018   • Fluad Quad 65+ 09/27/2020   • Fluzone High-Dose 65+yrs 11/06/2021, 10/06/2022   • Hepatitis B 06/25/2001, 07/25/2001, 11/27/2001   • Pneumococcal Conjugate 20-Valent (PCV20) 08/10/2022   • Tdap 03/26/2020       Allergies   Allergen Reactions   • Codeine Nausea And Vomiting   • Morphine Nausea And Vomiting   • Telmisartan Other (See Comments)     ALL ARB's, causes cough    • Ace Inhibitors Cough   • Molds & Smuts Other (See Comments)     Seasonal allergies           Current Outpatient Medications:   •  amLODIPine (NORVASC) 2.5 MG tablet, Take 1 tablet by mouth Daily., Disp: , Rfl:   •  aspirin 81 MG EC tablet, Take 1 tablet by mouth Daily., Disp: , Rfl:   •  atorvastatin (LIPITOR) 20 MG tablet, Take 1 tablet by mouth Daily., Disp: , Rfl:   •  azelastine (ASTELIN) 0.1 % nasal spray, 2 sprays into the nostril(s) as directed by provider 2 (Two) Times a Day. Use in each nostril as directed, Disp: , Rfl:   •  cyanocobalamin (VITAMIN B-12) 1000 MCG tablet, Take 1 tablet by mouth Daily., Disp: , Rfl:   •  Ergocalciferol (VITAMIN D2 PO), Take  by mouth., Disp: , Rfl:   •  fluticasone (FLONASE) 50 MCG/ACT nasal spray, 1 spray into the nostril(s) as directed by provider Daily As Needed., Disp: , Rfl:   •  levothyroxine (SYNTHROID, LEVOTHROID) 75 MCG tablet, Take 1 tablet by mouth Daily., Disp: , Rfl:   •  metFORMIN (GLUCOPHAGE) 500 MG tablet, Take 1 tablet by mouth 2 (Two) Times a Day., Disp: , Rfl:   •  metoprolol succinate XL (TOPROL-XL) 25 MG 24 hr tablet, Take 1 tablet by mouth Daily., Disp: , Rfl:   •  tolterodine LA (DETROL LA) 4 MG 24 hr capsule, Take 1 capsule by mouth Daily., Disp:  "30 capsule, Rfl: 11  •  azithromycin (ZITHROMAX) 250 MG tablet, Take 2 by mouth today then 1 daily for 4 days, Disp: 6 tablet, Rfl: 0  •  BinaxNOW COVID-19 Ag Home Test kit, TEST AS DIRECTED TODAY (Patient not taking: Reported on 5/12/2023), Disp: , Rfl:   •  diclofenac (VOLTAREN) 75 MG EC tablet, TAKE 1 TABLET BY MOUTH 2 TIMES A DAY *DO NOT CRUSH,CHEW,OR SPLIT* (Patient not taking: Reported on 5/12/2023), Disp: , Rfl:   •  fexofenadine (Allegra Allergy) 180 MG tablet, Take 1 tablet by mouth Daily. (Patient not taking: Reported on 5/12/2023), Disp: 30 tablet, Rfl: 6  •  losartan (COZAAR) 25 MG tablet, Take 25 mg by mouth Daily. (Patient not taking: Reported on 5/2/2023), Disp: , Rfl:   •  montelukast (SINGULAIR) 10 MG tablet, Take 10 mg by mouth Every Night. (Patient not taking: Reported on 5/2/2023), Disp: , Rfl:   •  predniSONE (DELTASONE) 20 MG tablet, Take 2 tablets by mouth Daily. (Patient not taking: Reported on 5/12/2023), Disp: 14 tablet, Rfl: 0     Objective     Vital Signs:   /66 (BP Location: Left arm, Patient Position: Sitting, Cuff Size: Adult)   Pulse 109   Temp 98.2 °F (36.8 °C) (Tympanic)   Resp 18   Ht 162.6 cm (64.02\")   Wt 78.7 kg (173 lb 9.6 oz)   SpO2 97% Comment: room air  BMI 29.78 kg/m²     Objective   Physical Exam  Constitutional:       General: She is not in acute distress.     Appearance: Normal appearance. She is normal weight. She is not ill-appearing.   HENT:      Right Ear: Tympanic membrane and ear canal normal.      Left Ear: Tympanic membrane and ear canal normal.      Nose: Nose normal.      Mouth/Throat:      Mouth: Mucous membranes are moist.      Pharynx: Oropharynx is clear.   Eyes:      Extraocular Movements: Extraocular movements intact.      Conjunctiva/sclera: Conjunctivae normal.      Pupils: Pupils are equal, round, and reactive to light.   Cardiovascular:      Rate and Rhythm: Normal rate and regular rhythm.      Pulses: Normal pulses.      Heart sounds: " Normal heart sounds.   Pulmonary:      Effort: Pulmonary effort is normal. No respiratory distress.      Breath sounds: Normal breath sounds. No stridor. No wheezing, rhonchi or rales.   Abdominal:      General: Bowel sounds are normal.      Palpations: Abdomen is soft.   Musculoskeletal:         General: No swelling. Normal range of motion.      Cervical back: Normal range of motion and neck supple.      Right lower leg: No edema.      Left lower leg: No edema.   Skin:     General: Skin is warm and dry.   Neurological:      General: No focal deficit present.      Mental Status: She is alert and oriented to person, place, and time.      Motor: No weakness.   Psychiatric:         Mood and Affect: Mood normal.         Behavior: Behavior normal.       Result Review :   I have personally reviewed patient's labs and images.            Diagnoses and all orders for this visit:    1. COVID-19 virus detected (Primary)    2. Dyspnea on exertion    3. Acute cough    4. Rhinorrhea    5. Wheezing    6. Seasonal allergies    7. Allergic rhinitis, unspecified seasonality, unspecified trigger    8. Chest heaviness       Impression and Plan    -CXR today 5/12/2023 showed no active disease, no evidence of pneumonia  -Patient has undergone full treatment for COVID, including Paxlovid, Z-Tarik, and prednisone.  Ongoing symptoms that the patient is experiencing such as runny nose and fatigue are likely part of the recovery phase of disease, encouraged patient that if she does not feel better in a few weeks that she may need to be seen again.  -Continue taking Singulair, Allegra, and Astelin and Flonase nasal sprays for seasonal allergies allergic rhinitis  -Continue following up with allergist as scheduled for allergy shots  -Spent 5 minutes counseling patient on diet and exercise.  Recommended a low-fat, 1800-calorie diet.  Also recommend 30 minutes of daily exercise.  Patient verbalizes understanding.  -Patient to keep regularly  scheduled appointment with Stefani 5/18/2023 to check in on her acute symptoms    Smoking status: Reviewed  Vaccination status: Patient reports she is up-to-date with her flu, pneumonia, and Covid vaccines.  Patient is advised to continue to follow CDC recommendations such as social distancing wearing a mask and washing hands for at least 20 seconds.  Medications personally reviewed    Follow Up   No follow-ups on file.  Patient was given instructions and counseling regarding her condition or for health maintenance advice. Please see specific information pulled into the AVS if appropriate.

## 2023-06-12 ENCOUNTER — TELEPHONE (OUTPATIENT)
Dept: UROLOGY | Facility: CLINIC | Age: 83
End: 2023-06-12
Payer: MEDICARE

## 2023-06-12 DIAGNOSIS — N32.81 OAB (OVERACTIVE BLADDER): Primary | ICD-10-CM

## 2023-06-12 NOTE — TELEPHONE ENCOUNTER
Spoke with patient, she states tolterodine is not working either. She is asking for Gemtesa samples until her appt on 7/12/23 with Dr. Brown.

## 2023-06-12 NOTE — TELEPHONE ENCOUNTER
Caller: Cinthia Díaz    Relationship: Self    Best call back number: [phone]    What is the best time to reach you: ANY    Who are you requesting to speak with (clinical staff, provider,  specific staff member): ALFA    Do you know the name of the person who called: ALFA    What was the call regarding: PRESCRIPTION    Is it okay if the provider responds through MyChart: NO      PATIENT WAS TOLD BY NURSE GRIMM TO CALL ABOUT A PRESCRIPTION. PLEASE CALL PATIENT BACK [phone] (CELLPHONE)      ---UNABLE TO WARM TRANSFER.

## 2023-08-17 ENCOUNTER — OFFICE VISIT (OUTPATIENT)
Dept: PULMONOLOGY | Facility: CLINIC | Age: 83
End: 2023-08-17
Payer: MEDICARE

## 2023-08-17 VITALS
HEART RATE: 84 BPM | SYSTOLIC BLOOD PRESSURE: 136 MMHG | WEIGHT: 168.9 LBS | TEMPERATURE: 97.1 F | OXYGEN SATURATION: 98 % | RESPIRATION RATE: 18 BRPM | BODY MASS INDEX: 29.93 KG/M2 | HEIGHT: 63 IN | DIASTOLIC BLOOD PRESSURE: 71 MMHG

## 2023-08-17 DIAGNOSIS — R91.1 LUNG NODULE: ICD-10-CM

## 2023-08-17 DIAGNOSIS — R05.9 COUGH, UNSPECIFIED TYPE: ICD-10-CM

## 2023-08-17 DIAGNOSIS — T78.40XD ALLERGY, SUBSEQUENT ENCOUNTER: Primary | ICD-10-CM

## 2023-08-17 DIAGNOSIS — K44.9 HIATAL HERNIA: ICD-10-CM

## 2023-08-17 DIAGNOSIS — J41.1 BRONCHITIS, MUCOPURULENT RECURRENT: ICD-10-CM

## 2023-08-17 DIAGNOSIS — J30.2 SEASONAL ALLERGIES: ICD-10-CM

## 2023-08-17 DIAGNOSIS — J18.9 COMMUNITY ACQUIRED PNEUMONIA, UNSPECIFIED LATERALITY: ICD-10-CM

## 2023-08-17 DIAGNOSIS — R06.2 WHEEZING: ICD-10-CM

## 2023-08-17 RX ORDER — AZELASTINE 1 MG/ML
2 SPRAY, METERED NASAL 2 TIMES DAILY
Qty: 30 ML | Refills: 9 | Status: SHIPPED | OUTPATIENT
Start: 2023-08-17

## 2023-08-17 RX ORDER — MONTELUKAST SODIUM 10 MG/1
10 TABLET ORAL NIGHTLY
Qty: 90 TABLET | Refills: 3 | Status: SHIPPED | OUTPATIENT
Start: 2023-08-17

## 2023-08-17 RX ORDER — FLUTICASONE PROPIONATE 50 MCG
1 SPRAY, SUSPENSION (ML) NASAL DAILY PRN
Qty: 18.2 ML | Refills: 1 | Status: SHIPPED | OUTPATIENT
Start: 2023-08-17

## 2023-08-17 NOTE — PROGRESS NOTES
Primary Care Provider  Yasmin Heck MD     Referring Provider  No ref. provider found     Chief Complaint  Pneumonia, Lung Nodule, and Follow-up (6 month follow up/ Chest ct results)    Subjective          Cinthia Díaz presents to [facility]  History of Present Illness  Cinthia Díaz is a 83 y.o. female patient with history of recurrent bronchitis, lung nodules, persistent chronic cough, lifelong non-smoker.  She is here for follow-up.  Since last office visit, patient has been on Claritin once daily, Singulair once daily and Flonase once daily.  She recently had repeat CT scan of the chest which was reviewed with her today.  She was on Flonase and Astelin.  Currently, she is not using any nasal sprays.  She has been having significant postnasal drip and has intermittent cough. She also has been getting allergy shots through allergy and immunology service.  She had pneumonia shots in past but has not received it over the last several years.  She does have cough with mucus production early in the morning.  She has no nausea or vomiting.  She has dryness of mouth and congestion at times.  No chest pain or chest tightness.    Review of Systems     General:  No Fatigue, No Fever No weight loss or loss of appetite  HEENT: No dysphagia, No Visual Changes, no rhinorrhea  Respiratory:  + cough,+Dyspnea, mucoid phlegm, No Pleuritic Pain, no wheezing, no hemoptysis.  Cardiovascular: Denies chest pain, denies palpitations,+JAFFE, No Chest Pressure  Gastrointestinal:  No Abdominal Pain, No Nausea, No Vomiting, No Diarrhea  Genitourinary:  No Dysuria, No Frequency, No Hesitancy  Musculoskeletal: No muscle pain or swelling  Endocrine:  No Heat Intolerance, No Cold Intolerance  Hematologic:  No Bleeding, No Bruising  Psychiatric:  No Anxiety, No Depression  Neurologic:  No Confusion, no Dysarthria, No Headaches  Skin:  No Rash, No Open Wounds    Family History    Problem Relation Age of Onset    Stroke Mother     Diabetes Mother     Cancer Father     Colon cancer Father     Other Brother     Malig Hyperthermia Neg Hx         Social History     Socioeconomic History    Marital status:    Tobacco Use    Smoking status: Never     Passive exposure: Past    Smokeless tobacco: Never   Vaping Use    Vaping Use: Never used   Substance and Sexual Activity    Alcohol use: Never    Drug use: Never    Sexual activity: Defer        Past Medical History:   Diagnosis Date    Arthritis     Diabetes mellitus     Disease of thyroid gland     Hyperlipidemia     Hypertension     Pneumonia     Seasonal allergies         Immunization History   Administered Date(s) Administered    COVID-19 (MODERNA) 1st,2nd,3rd Dose Monovalent 01/13/2021, 02/09/2021, 11/06/2021    COVID-19 (MODERNA) Monovalent Original Booster 07/06/2022    Flu Vaccine Quad PF >36MO 10/16/2015, 09/25/2018    Fluad Quad 65+ 09/27/2020    Fluzone >6mos 10/16/2015, 09/25/2018    Fluzone High-Dose 65+yrs 11/06/2021, 10/06/2022    Hepatitis B 06/25/2001, 07/25/2001, 11/27/2001    Pneumococcal Conjugate 20-Valent (PCV20) 08/10/2022    Tdap 03/26/2020         Allergies   Allergen Reactions    Codeine Nausea And Vomiting    Morphine Nausea And Vomiting    Telmisartan Other (See Comments)     ALL ARB's, causes cough     Ace Inhibitors Cough    Molds & Smuts Other (See Comments)     Seasonal allergies           Current Outpatient Medications:     amLODIPine (NORVASC) 2.5 MG tablet, Take 1 tablet by mouth Daily., Disp: , Rfl:     aspirin 81 MG EC tablet, Take 1 tablet by mouth Daily., Disp: , Rfl:     atorvastatin (LIPITOR) 20 MG tablet, Take 1 tablet by mouth Daily., Disp: , Rfl:     cyanocobalamin (VITAMIN B-12) 1000 MCG tablet, Take 1 tablet by mouth Daily., Disp: , Rfl:     Ergocalciferol (VITAMIN D2 PO), Take  by mouth., Disp: , Rfl:     fluticasone (FLONASE) 50 MCG/ACT nasal spray, 1 spray into the nostril(s) as directed by  "provider Daily As Needed for Rhinitis or Allergies., Disp: 18.2 mL, Rfl: 1    levothyroxine (SYNTHROID, LEVOTHROID) 75 MCG tablet, Take 1 tablet by mouth Daily., Disp: , Rfl:     metFORMIN (GLUCOPHAGE) 500 MG tablet, Take 1 tablet by mouth 2 (Two) Times a Day., Disp: , Rfl:     metoprolol succinate XL (TOPROL-XL) 25 MG 24 hr tablet, Take 1 tablet by mouth Daily., Disp: , Rfl:     montelukast (SINGULAIR) 10 MG tablet, Take 1 tablet by mouth Every Night., Disp: 90 tablet, Rfl: 3    azelastine (ASTELIN) 0.1 % nasal spray, 2 sprays into the nostril(s) as directed by provider 2 (Two) Times a Day. Use in each nostril as directed (Patient not taking: Reported on 8/17/2023), Disp: , Rfl:     azelastine (ASTELIN) 0.1 % nasal spray, 2 sprays into the nostril(s) as directed by provider 2 (Two) Times a Day. Use in each nostril as directed, Disp: 30 mL, Rfl: 9    diclofenac (VOLTAREN) 75 MG EC tablet, TAKE 1 TABLET BY MOUTH 2 TIMES A DAY *DO NOT CRUSH,CHEW,OR SPLIT* (Patient not taking: Reported on 5/12/2023), Disp: , Rfl:     fexofenadine (Allegra Allergy) 180 MG tablet, Take 1 tablet by mouth Daily. (Patient not taking: Reported on 5/12/2023), Disp: 30 tablet, Rfl: 6    losartan (COZAAR) 25 MG tablet, Take 25 mg by mouth Daily. (Patient not taking: Reported on 5/2/2023), Disp: , Rfl:     Vibegron 75 MG tablet, Take 1 tablet by mouth Daily for 84 days. (Patient not taking: Reported on 8/17/2023), Disp: 84 tablet, Rfl: 0     Objective   Vital Signs:   /71 (BP Location: Left arm, Patient Position: Sitting, Cuff Size: Adult)   Pulse 84   Temp 97.1 øF (36.2 øC) (Tympanic)   Resp 18   Ht 160 cm (63\")   Wt 76.6 kg (168 lb 14.4 oz)   SpO2 98% Comment: room air  BMI 29.92 kg/mý     Mallampatti classification : 1  Physical Exam  Vital Signs Reviewed  WDWN, Alert, NAD.   HEENT:  PERRL, EOMI.  OP, nares clear, no sinus tenderness  Neck:  Supple, no JVD, no thyromegaly  Lymph: no axillary, cervical, supraclavicular " lymphadenopathy noted bilaterally  Chest:  good aeration, bilateral diminished breath sounds, no wheezing, crackles or rhonchi, resonant to percussion b/l  CV: RRR, no MGR, pulses 2+, equal.  Abd:  Soft, NT, ND, + BS, no HSM  EXT:  no clubbing, no cyanosis, No BLE edema  Neuro:  A&Ox3, CN grossly intact, no focal deficits.  Skin: No rashes or lesions noted     Result Review :   The following data was reviewed by: Arnulfo Villaseñor MD on 08/10/2022:  Common labs          10/24/2022    07:09 4/4/2023    06:50 5/2/2023    13:08   Common Labs   Glucose 146  123  119    BUN 13  15  19    Creatinine 0.63  0.69  0.74    Sodium 143  142  140    Potassium 4.4  4.3  4.1    Chloride 105  104  105    Calcium 8.8  9.8  9.7    Albumin 3.80  4.3  4.4    Total Bilirubin 0.6  0.9  0.4    Alkaline Phosphatase 61  62  72    AST (SGOT) 23  18  21    ALT (SGPT) 20  21  22    WBC   9.62    Hemoglobin   14.4    Hematocrit   41.2    Platelets   221    Total Cholesterol  164     Triglycerides  126     HDL Cholesterol  59     LDL Cholesterol   83     Hemoglobin A1C 6.90        CMP          10/24/2022    07:09 4/4/2023    06:50 5/2/2023    13:08   CMP   Glucose 146  123  119    BUN 13  15  19    Creatinine 0.63  0.69  0.74    EGFR 89.3  87.3  81.4    Sodium 143  142  140    Potassium 4.4  4.3  4.1    Chloride 105  104  105    Calcium 8.8  9.8  9.7    Total Protein 6.0  6.4  6.6    Albumin 3.80  4.3  4.4    Globulin 2.2  2.1  2.2    Total Bilirubin 0.6  0.9  0.4    Alkaline Phosphatase 61  62  72    AST (SGOT) 23  18  21    ALT (SGPT) 20  21  22    Albumin/Globulin Ratio 1.7  2.0  2.0    BUN/Creatinine Ratio 20.6  21.7  25.7    Anion Gap 10.6  11.2  13.4      CBC          5/2/2023    13:08   CBC   WBC 9.62    RBC 4.67    Hemoglobin 14.4    Hematocrit 41.2    MCV 88.2    MCH 30.8    MCHC 35.0    RDW 13.3    Platelets 221      CBC w/diff      CBC w/Diff 12/15/21 4/21/22 4/22/22   WBC 5.31 13.24 (A) 11.53 (A)   RBC 4.49 4.73 3.86   Hemoglobin 14.0  14.5 11.6 (A)   Hematocrit 42.2 42.5 35.5   MCV 94.0 89.9 92.0   MCH 31.2 30.7 30.1   MCHC 33.2 34.1 32.7   RDW 12.2 (A) 13.2 13.4   Platelets 238 214 191   Neutrophil Rel % 56.2 89.2 (A) 75.0   Immature Granulocyte Rel % 0.2 0.5 0.3   Lymphocyte Rel % 31.5 2.9 (A) 14.9 (A)   Monocyte Rel % 8.1 6.9 8.4   Eosinophil Rel % 3.4 0.2 (A) 1.1   Basophil Rel % 0.6 0.3 0.3   (A) Abnormal value              Data reviewed: Radiologic studies Previous imaging reviewed.      Shows improvement on pneumonia from during hospitalization.  Narrative & Impression   PROCEDURE:  CT CHEST WO CONTRAST DIAGNOSTIC     COMPARISON: [facility], CT, CHEST W/O CONTRAST, 2/16/2017, 10:01.  [facility], CT, CT CHEST WO CONTRAST DIAGNOSTIC, 7/18/2022, 10:37.     INDICATIONS:  FOLLOW UP LUNG NODULES.     TECHNIQUE:    CT images were created without the administration of contrast material.       PROTOCOL:     Standard imaging protocol performed                 RADIATION:      DLP: 341.7mGy*cm               Automated exposure control was utilized to minimize radiation dose.      FINDINGS:          Moderate coronary artery atherosclerotic disease is noted.  Heart size appears normal.  A small   hiatal hernia is present.  The ascending thoracic aorta is borderline aneurysmal measuring 3.6 cm   AP.  No adenopathy or effusion is identified.     Irregular densities in the lung apices are stable, favored to represent scarring.  In the left   lower lobe on image 48 is a 0.4 cm nodule, not significantly changed since 2/16/2017 consistent   with a granuloma.  In the left lower lobe on image 57 is a 0.2 cm nodule, also unchanged and   consistent with a granuloma.  No new or enlarging pulmonary nodule is identified.     A cholecystectomy has been performed.  In the left hepatic lobe on image 118 is a 1.5 cm low   density focus which is chronic, compatible with a cyst.  No focal osseous lesion is seen.           IMPRESSION:                  1. Moderate coronary artery atherosclerotic disease  2. Small hiatal hernia  3. Stable pulmonary nodules, as described above consistent with granulomas  4. Previous cholecystectomy            Tarik Blood M.D.         Electronically Signed and Approved By: Tarik Blood M.D. on 7/19/2023 at 14:17                      Assessment and Plan    Diagnoses and all orders for this visit:    1. Allergy, subsequent encounter (Primary)  -     montelukast (SINGULAIR) 10 MG tablet; Take 1 tablet by mouth Every Night.  Dispense: 90 tablet; Refill: 3  -     fluticasone (FLONASE) 50 MCG/ACT nasal spray; 1 spray into the nostril(s) as directed by provider Daily As Needed for Rhinitis or Allergies.  Dispense: 18.2 mL; Refill: 1  -     azelastine (ASTELIN) 0.1 % nasal spray; 2 sprays into the nostril(s) as directed by provider 2 (Two) Times a Day. Use in each nostril as directed  Dispense: 30 mL; Refill: 9    2. Seasonal allergies  -     montelukast (SINGULAIR) 10 MG tablet; Take 1 tablet by mouth Every Night.  Dispense: 90 tablet; Refill: 3  -     fluticasone (FLONASE) 50 MCG/ACT nasal spray; 1 spray into the nostril(s) as directed by provider Daily As Needed for Rhinitis or Allergies.  Dispense: 18.2 mL; Refill: 1  -     azelastine (ASTELIN) 0.1 % nasal spray; 2 sprays into the nostril(s) as directed by provider 2 (Two) Times a Day. Use in each nostril as directed  Dispense: 30 mL; Refill: 9    3. Hiatal hernia  -     montelukast (SINGULAIR) 10 MG tablet; Take 1 tablet by mouth Every Night.  Dispense: 90 tablet; Refill: 3  -     fluticasone (FLONASE) 50 MCG/ACT nasal spray; 1 spray into the nostril(s) as directed by provider Daily As Needed for Rhinitis or Allergies.  Dispense: 18.2 mL; Refill: 1  -     azelastine (ASTELIN) 0.1 % nasal spray; 2 sprays into the nostril(s) as directed by provider 2 (Two) Times a Day. Use in each nostril as directed  Dispense: 30 mL; Refill: 9    4. Lung nodule  -     montelukast (SINGULAIR) 10  MG tablet; Take 1 tablet by mouth Every Night.  Dispense: 90 tablet; Refill: 3  -     fluticasone (FLONASE) 50 MCG/ACT nasal spray; 1 spray into the nostril(s) as directed by provider Daily As Needed for Rhinitis or Allergies.  Dispense: 18.2 mL; Refill: 1  -     azelastine (ASTELIN) 0.1 % nasal spray; 2 sprays into the nostril(s) as directed by provider 2 (Two) Times a Day. Use in each nostril as directed  Dispense: 30 mL; Refill: 9    5. Bronchitis, mucopurulent recurrent  -     montelukast (SINGULAIR) 10 MG tablet; Take 1 tablet by mouth Every Night.  Dispense: 90 tablet; Refill: 3  -     fluticasone (FLONASE) 50 MCG/ACT nasal spray; 1 spray into the nostril(s) as directed by provider Daily As Needed for Rhinitis or Allergies.  Dispense: 18.2 mL; Refill: 1  -     azelastine (ASTELIN) 0.1 % nasal spray; 2 sprays into the nostril(s) as directed by provider 2 (Two) Times a Day. Use in each nostril as directed  Dispense: 30 mL; Refill: 9    6. Community acquired pneumonia, unspecified laterality  -     montelukast (SINGULAIR) 10 MG tablet; Take 1 tablet by mouth Every Night.  Dispense: 90 tablet; Refill: 3  -     fluticasone (FLONASE) 50 MCG/ACT nasal spray; 1 spray into the nostril(s) as directed by provider Daily As Needed for Rhinitis or Allergies.  Dispense: 18.2 mL; Refill: 1  -     azelastine (ASTELIN) 0.1 % nasal spray; 2 sprays into the nostril(s) as directed by provider 2 (Two) Times a Day. Use in each nostril as directed  Dispense: 30 mL; Refill: 9    7. Wheezing  -     montelukast (SINGULAIR) 10 MG tablet; Take 1 tablet by mouth Every Night.  Dispense: 90 tablet; Refill: 3  -     fluticasone (FLONASE) 50 MCG/ACT nasal spray; 1 spray into the nostril(s) as directed by provider Daily As Needed for Rhinitis or Allergies.  Dispense: 18.2 mL; Refill: 1  -     azelastine (ASTELIN) 0.1 % nasal spray; 2 sprays into the nostril(s) as directed by provider 2 (Two) Times a Day. Use in each nostril as directed   Dispense: 30 mL; Refill: 9    8. Cough, unspecified type  -     montelukast (SINGULAIR) 10 MG tablet; Take 1 tablet by mouth Every Night.  Dispense: 90 tablet; Refill: 3  -     fluticasone (FLONASE) 50 MCG/ACT nasal spray; 1 spray into the nostril(s) as directed by provider Daily As Needed for Rhinitis or Allergies.  Dispense: 18.2 mL; Refill: 1  -     azelastine (ASTELIN) 0.1 % nasal spray; 2 sprays into the nostril(s) as directed by provider 2 (Two) Times a Day. Use in each nostril as directed  Dispense: 30 mL; Refill: 9      Continue with Singulair once daily.  Continue with Allegra.  Allergy shots through allergy and immunology service    Her CT scan showed improvement from her previous pneumonia.  Repeat CT scan shows stable findings with stable lung nodules up to 2.4 cm in size.  Does not need repeat imaging for the lung nodules.    Administered Prevnar 20 vaccine last office visit.    Will likely benefit from azelastine nasal spray.  Start back on azelastine nasal spray and Flonase nasal spray.   Keep herself as active as possible.    Advised her to get flu shot once it is available.  Up-to-date on COVID-vaccine.    Follow Up   No follow-ups on file.  Patient was given instructions and counseling regarding her condition or for health maintenance advice. Please see specific information pulled into the AVS if appropriate.       Electronically signed by Arnulfo Villaseñor MD, 8/17/2023, 15:21 EDT.

## 2023-08-21 ENCOUNTER — LAB (OUTPATIENT)
Dept: LAB | Facility: HOSPITAL | Age: 83
End: 2023-08-21
Payer: MEDICARE

## 2023-08-21 ENCOUNTER — TRANSCRIBE ORDERS (OUTPATIENT)
Dept: ADMINISTRATIVE | Facility: HOSPITAL | Age: 83
End: 2023-08-21
Payer: MEDICARE

## 2023-08-21 DIAGNOSIS — E55.9 VITAMIN D DEFICIENCY: ICD-10-CM

## 2023-08-21 DIAGNOSIS — E78.5 HYPERLIPIDEMIA, UNSPECIFIED HYPERLIPIDEMIA TYPE: ICD-10-CM

## 2023-08-21 DIAGNOSIS — E11.9 DIABETES MELLITUS WITHOUT COMPLICATION: ICD-10-CM

## 2023-08-21 DIAGNOSIS — I10 ESSENTIAL HYPERTENSION, MALIGNANT: ICD-10-CM

## 2023-08-21 DIAGNOSIS — E11.9 DIABETES MELLITUS WITHOUT COMPLICATION: Primary | ICD-10-CM

## 2023-08-21 LAB
25(OH)D3 SERPL-MCNC: 25.8 NG/ML (ref 30–100)
ALBUMIN SERPL-MCNC: 4 G/DL (ref 3.5–5.2)
ALBUMIN/GLOB SERPL: 1.8 G/DL
ALP SERPL-CCNC: 63 U/L (ref 39–117)
ALT SERPL W P-5'-P-CCNC: 19 U/L (ref 1–33)
ANION GAP SERPL CALCULATED.3IONS-SCNC: 8.2 MMOL/L (ref 5–15)
AST SERPL-CCNC: 17 U/L (ref 1–32)
BILIRUB SERPL-MCNC: 0.6 MG/DL (ref 0–1.2)
BUN SERPL-MCNC: 14 MG/DL (ref 8–23)
BUN/CREAT SERPL: 22.6 (ref 7–25)
CALCIUM SPEC-SCNC: 9.3 MG/DL (ref 8.6–10.5)
CHLORIDE SERPL-SCNC: 103 MMOL/L (ref 98–107)
CO2 SERPL-SCNC: 26.8 MMOL/L (ref 22–29)
CREAT SERPL-MCNC: 0.62 MG/DL (ref 0.57–1)
EGFRCR SERPLBLD CKD-EPI 2021: 89 ML/MIN/1.73
GLOBULIN UR ELPH-MCNC: 2.2 GM/DL
GLUCOSE SERPL-MCNC: 128 MG/DL (ref 65–99)
HBA1C MFR BLD: 7.1 % (ref 4.8–5.6)
POTASSIUM SERPL-SCNC: 3.7 MMOL/L (ref 3.5–5.2)
PROT SERPL-MCNC: 6.2 G/DL (ref 6–8.5)
SODIUM SERPL-SCNC: 138 MMOL/L (ref 136–145)

## 2023-08-21 PROCEDURE — 36415 COLL VENOUS BLD VENIPUNCTURE: CPT

## 2023-08-21 PROCEDURE — 80053 COMPREHEN METABOLIC PANEL: CPT

## 2023-08-21 PROCEDURE — 82306 VITAMIN D 25 HYDROXY: CPT

## 2023-08-21 PROCEDURE — 83036 HEMOGLOBIN GLYCOSYLATED A1C: CPT

## 2023-11-27 ENCOUNTER — LAB (OUTPATIENT)
Dept: LAB | Facility: HOSPITAL | Age: 83
End: 2023-11-27
Payer: MEDICARE

## 2023-11-27 ENCOUNTER — TRANSCRIBE ORDERS (OUTPATIENT)
Dept: ADMINISTRATIVE | Facility: HOSPITAL | Age: 83
End: 2023-11-27
Payer: MEDICARE

## 2023-11-27 DIAGNOSIS — E03.9 HYPOTHYROIDISM, UNSPECIFIED TYPE: ICD-10-CM

## 2023-11-27 DIAGNOSIS — E78.5 HYPERLIPIDEMIA, UNSPECIFIED HYPERLIPIDEMIA TYPE: ICD-10-CM

## 2023-11-27 DIAGNOSIS — E55.9 VITAMIN D DEFICIENCY: ICD-10-CM

## 2023-11-27 DIAGNOSIS — E11.9 DIABETES MELLITUS WITHOUT COMPLICATION: ICD-10-CM

## 2023-11-27 DIAGNOSIS — E11.9 DIABETES MELLITUS WITHOUT COMPLICATION: Primary | ICD-10-CM

## 2023-11-27 LAB
25(OH)D3 SERPL-MCNC: 39 NG/ML (ref 30–100)
ALBUMIN SERPL-MCNC: 4.3 G/DL (ref 3.5–5.2)
ALBUMIN/GLOB SERPL: 1.5 G/DL
ALP SERPL-CCNC: 74 U/L (ref 39–117)
ALT SERPL W P-5'-P-CCNC: 20 U/L (ref 1–33)
ANION GAP SERPL CALCULATED.3IONS-SCNC: 12.1 MMOL/L (ref 5–15)
AST SERPL-CCNC: 26 U/L (ref 1–32)
BILIRUB SERPL-MCNC: 0.7 MG/DL (ref 0–1.2)
BUN SERPL-MCNC: 9 MG/DL (ref 8–23)
BUN/CREAT SERPL: 13.8 (ref 7–25)
CALCIUM SPEC-SCNC: 9.7 MG/DL (ref 8.6–10.5)
CHLORIDE SERPL-SCNC: 104 MMOL/L (ref 98–107)
CO2 SERPL-SCNC: 23.9 MMOL/L (ref 22–29)
CREAT SERPL-MCNC: 0.65 MG/DL (ref 0.57–1)
EGFRCR SERPLBLD CKD-EPI 2021: 88 ML/MIN/1.73
GLOBULIN UR ELPH-MCNC: 2.8 GM/DL
GLUCOSE SERPL-MCNC: 151 MG/DL (ref 65–99)
HBA1C MFR BLD: 6.8 % (ref 4.8–5.6)
POTASSIUM SERPL-SCNC: 3.7 MMOL/L (ref 3.5–5.2)
PROT SERPL-MCNC: 7.1 G/DL (ref 6–8.5)
SODIUM SERPL-SCNC: 140 MMOL/L (ref 136–145)
T4 FREE SERPL-MCNC: 1.44 NG/DL (ref 0.93–1.7)
TSH SERPL DL<=0.05 MIU/L-ACNC: 3.66 UIU/ML (ref 0.27–4.2)

## 2023-11-27 PROCEDURE — 80053 COMPREHEN METABOLIC PANEL: CPT

## 2023-11-27 PROCEDURE — 82306 VITAMIN D 25 HYDROXY: CPT

## 2023-11-27 PROCEDURE — 83036 HEMOGLOBIN GLYCOSYLATED A1C: CPT

## 2023-11-27 PROCEDURE — 36415 COLL VENOUS BLD VENIPUNCTURE: CPT

## 2023-11-27 PROCEDURE — 84443 ASSAY THYROID STIM HORMONE: CPT

## 2023-11-27 PROCEDURE — 84439 ASSAY OF FREE THYROXINE: CPT

## 2023-12-11 NOTE — PROGRESS NOTES
Primary Care Provider  Yasmin Heck MD   Referring Provider  No ref. provider found    Patient Complaint  Follow-up, Cough, and Wheezing      SUBJECTIVE    History of Presenting Illness  Cinthia Díaz is a pleasant 83 y.o. female who presents to [facility] for acute illness. Patient states she has been sick since 11/19/2023. She was seen in  on 11/19/2023 and tested negative for COVID and FLU. CXR showed no active disease.  Patient last saw Dr. Villaseñor 8/17/2023.  Patient has a history of recurrent bronchitis, lung nodules, persistent chronic cough and is a lifelong non-smoker.  Patient continues on Claritin and Singulair Flonase and azelastine daily for allergy symptoms.  She does get allergy shots through [facility].  Patient states she saw her PCP and is currently on erythromycin.  Patient states she has improved and is doing much better.  Patient states she just has a cough that comes on her at times.    Denies dyspnea, wheezing, headaches, chest pain, weight loss or hemoptysis. Denies fevers, chills and night sweats. Cinthia Díaz is able to perform ADLs without difficulties and denies any swollen glands/lymph nodes in the head or neck.    I have personally reviewed the review of systems, past family, social, medical and surgical histories; and agree with their findings.    Review of Systems  Constitutional symptoms:  Denied complaints   Ear, nose, throat: Denied complaints  Cardiovascular:  Denied complaints  Respiratory: Cough  Gastrointestinal: Denied complaints  Musculoskeletal: Denied complaints    Family History   Problem Relation Age of Onset    Stroke Mother     Diabetes Mother     Cancer Father     Colon cancer Father     Other Brother     Malig Hyperthermia Neg Hx         Social History     Socioeconomic History    Marital status:    Tobacco Use    Smoking status: Never     Passive exposure: Past     Smokeless tobacco: Never   Vaping Use    Vaping Use: Never used   Substance and Sexual Activity    Alcohol use: Never    Drug use: Never    Sexual activity: Defer        Past Medical History:   Diagnosis Date    Arthritis     Diabetes mellitus     Disease of thyroid gland     Hyperlipidemia     Hypertension     Pneumonia     Seasonal allergies         Immunization History   Administered Date(s) Administered    ABRYSVO (RSV, 60+ or pregnant women 32-36 wks) 09/06/2023    COVID-19 (MODERNA) 1st,2nd,3rd Dose Monovalent 01/13/2021, 02/09/2021, 11/06/2021    COVID-19 (MODERNA) Monovalent Original Booster 07/06/2022    Flu Vaccine Quad PF >36MO 10/16/2015, 09/25/2018    Fluad Quad 65+ 09/27/2020    Fluzone (or Fluarix & Flulaval for VFC) >6mos 10/16/2015, 09/25/2018    Fluzone High-Dose 65+yrs 11/06/2021, 10/06/2022    Hepatitis B 06/25/2001, 07/25/2001, 11/27/2001    Pneumococcal Conjugate 20-Valent (PCV20) 08/10/2022    Tdap 03/26/2020       Allergies   Allergen Reactions    Codeine Nausea And Vomiting    Morphine Nausea And Vomiting    Telmisartan Other (See Comments)     ALL ARB's, causes cough     Ace Inhibitors Cough    Molds & Smuts Other (See Comments)     Seasonal allergies           Current Outpatient Medications:     amLODIPine (NORVASC) 2.5 MG tablet, Take 1 tablet by mouth Daily., Disp: , Rfl:     aspirin 81 MG EC tablet, Take 1 tablet by mouth Daily., Disp: , Rfl:     atorvastatin (LIPITOR) 20 MG tablet, Take 1 tablet by mouth Daily., Disp: , Rfl:     azelastine (ASTELIN) 0.1 % nasal spray, 2 sprays into the nostril(s) as directed by provider 2 (Two) Times a Day. Use in each nostril as directed, Disp: 30 mL, Rfl: 9    cyanocobalamin (VITAMIN B-12) 1000 MCG tablet, Take 1 tablet by mouth Daily., Disp: , Rfl:     Ergocalciferol (VITAMIN D2 PO), Take  by mouth., Disp: , Rfl:     erythromycin base (E-MYCIN) 500 MG tablet, Take 1 tablet by mouth 2 (Two) Times a Day With Meals., Disp: , Rfl:     fluticasone  "(FLONASE) 50 MCG/ACT nasal spray, 1 spray into the nostril(s) as directed by provider Daily As Needed for Rhinitis or Allergies., Disp: 18.2 mL, Rfl: 1    levothyroxine (SYNTHROID, LEVOTHROID) 75 MCG tablet, Take 1 tablet by mouth Daily., Disp: , Rfl:     metFORMIN (GLUCOPHAGE) 500 MG tablet, Take 1 tablet by mouth 2 (Two) Times a Day., Disp: , Rfl:     metoprolol succinate XL (TOPROL-XL) 25 MG 24 hr tablet, Take 1 tablet by mouth Daily., Disp: , Rfl:     montelukast (SINGULAIR) 10 MG tablet, Take 1 tablet by mouth Every Night., Disp: 90 tablet, Rfl: 3    benzonatate (TESSALON) 200 MG capsule, Take 1 capsule by mouth 3 (Three) Times a Day As Needed for Cough., Disp: 42 capsule, Rfl: 2    celecoxib (CeleBREX) 200 MG capsule, Take 1 capsule by mouth Daily. (Patient not taking: Reported on 12/14/2023), Disp: , Rfl:            Vital Signs   /96 (BP Location: Right arm, Patient Position: Sitting, Cuff Size: Adult)   Pulse 104   Temp 97.8 °F (36.6 °C) (Temporal)   Resp 16   Ht 162.6 cm (64\")   Wt 74.9 kg (165 lb 3.2 oz)   SpO2 98% Comment: room air  BMI 28.36 kg/m²       OBJECTIVE    Physical Exam  Vital Signs Reviewed   WDWN, Alert, NAD.    HEENT:  PERRL, EOMI.  OP, nares clear  Neck:  Supple, no JVD, no thyromegaly  Chest:  good aeration, clear to auscultation bilaterally, tympanic to percussion bilaterally, no work of breathing noted  CV: RRR, no MGR, pulses 2+, equal.  Abd:  Soft, NT, ND, + BS, no HSM  EXT:  no clubbing, no cyanosis, no edema  Neuro:  A&Ox3, CN grossly intact, no focal deficits.  Skin: No rashes or lesions noted    Results Review  I have personally reviewed the prior office notes, hospital records, labs, and diagnostics.  XR Chest 2 View [[id number]] (Order [id number])  Order  Status: Final result     Appointment Information    PACS Images     Radiology Images  Study Result    Narrative & Impression   PROCEDURE:  XR CHEST 2 VW     COMPARISON: [facility], CR, XR CHEST 2 VW, " 5/12/2023, 14:22.  [facility], CT, CT CHEST WO CONTRAST DIAGNOSTIC, 7/18/2023, 15:13.     INDICATIONS:  cough, congestion     FINDINGS:          The heart is normal in size.  The lungs are well-expanded and free of infiltrates.     Degenerative changes are seen in the thoracic spine.     IMPRESSION:               No active cardiopulmonary disease is seen.            JENNIFER JOHNSON MD         Electronically Signed and Approved By: JENNIFER JOHNSON MD on 11/19/2023 at 9:49     ASSESSMENT         Patient Active Problem List   Diagnosis    History of pneumonia    Lung nodule    Allergies    Bronchitis, mucopurulent recurrent    Hiatal hernia    Colon cancer screening    Nocturia    OAB (overactive bladder)    Lower leg edema    Sepsis without acute organ dysfunction, due to unspecified organism    Community acquired pneumonia    Seasonal allergies    Wheezing    Cough    Dyspnea    COVID-19 virus detected       Encounter Diagnoses   Name Primary?    Bronchitis, mucopurulent recurrent Yes    Cough, unspecified type     Seasonal allergies       PLAN  -Tessalon Perles sent into patient's pharmacy for cough  -Patient to continue with azelastine and Flonase and Singulair as prescribed  -Patient will follow back up at neck scheduled appointment or sooner if needed  Diagnoses and all orders for this visit:    1. Bronchitis, mucopurulent recurrent (Primary)  -     benzonatate (TESSALON) 200 MG capsule; Take 1 capsule by mouth 3 (Three) Times a Day As Needed for Cough.  Dispense: 42 capsule; Refill: 2    2. Cough, unspecified type  -     benzonatate (TESSALON) 200 MG capsule; Take 1 capsule by mouth 3 (Three) Times a Day As Needed for Cough.  Dispense: 42 capsule; Refill: 2    3. Seasonal allergies  -     benzonatate (TESSALON) 200 MG capsule; Take 1 capsule by mouth 3 (Three) Times a Day As Needed for Cough.  Dispense: 42 capsule; Refill: 2           Smoking status:never  Vaccination status:reports up to  date  Medications personally reviewed    Follow Up  Return for Next scheduled follow up.    Patient was given instructions and counseling regarding her condition or for health maintenance advice. Please see specific information pulled into the AVS if appropriate.

## 2023-12-14 ENCOUNTER — OFFICE VISIT (OUTPATIENT)
Dept: PULMONOLOGY | Facility: CLINIC | Age: 83
End: 2023-12-14
Payer: MEDICARE

## 2023-12-14 VITALS
TEMPERATURE: 97.8 F | DIASTOLIC BLOOD PRESSURE: 96 MMHG | HEART RATE: 104 BPM | BODY MASS INDEX: 28.2 KG/M2 | OXYGEN SATURATION: 98 % | HEIGHT: 64 IN | SYSTOLIC BLOOD PRESSURE: 119 MMHG | RESPIRATION RATE: 16 BRPM | WEIGHT: 165.2 LBS

## 2023-12-14 DIAGNOSIS — J41.1 BRONCHITIS, MUCOPURULENT RECURRENT: Primary | ICD-10-CM

## 2023-12-14 DIAGNOSIS — J30.2 SEASONAL ALLERGIES: ICD-10-CM

## 2023-12-14 DIAGNOSIS — R05.9 COUGH, UNSPECIFIED TYPE: ICD-10-CM

## 2023-12-14 PROCEDURE — 1160F RVW MEDS BY RX/DR IN RCRD: CPT | Performed by: NURSE PRACTITIONER

## 2023-12-14 PROCEDURE — 99214 OFFICE O/P EST MOD 30 MIN: CPT | Performed by: NURSE PRACTITIONER

## 2023-12-14 PROCEDURE — 1159F MED LIST DOCD IN RCRD: CPT | Performed by: NURSE PRACTITIONER

## 2023-12-14 RX ORDER — BENZONATATE 200 MG/1
200 CAPSULE ORAL 3 TIMES DAILY PRN
Qty: 42 CAPSULE | Refills: 2 | Status: SHIPPED | OUTPATIENT
Start: 2023-12-14

## 2023-12-14 RX ORDER — ERYTHROMYCIN 500 MG/1
500 TABLET, COATED ORAL 2 TIMES DAILY WITH MEALS
COMMUNITY
Start: 2023-12-07

## 2024-03-06 ENCOUNTER — TELEPHONE (OUTPATIENT)
Dept: SURGERY | Facility: CLINIC | Age: 84
End: 2024-03-06
Payer: MEDICARE

## 2024-03-06 NOTE — TELEPHONE ENCOUNTER
Hub staff attempted to follow warm transfer process and was unsuccessful     Caller: Cinthia Díaz     Relationship to patient: SELF    Best call back number: [phone]     Patient is needing: PT HAD STOPPED IN THE OFFICE EARLIER TODAY AND THEY MENTIONED PT MIGHT BE ABLE TO GET IN TO SEE PINKY KNIGHT. PT DIDN'T SCHEDULE APPT WHILE SHE WAS THERE, SHE WENT HOME TO SEE IF SHE COULD GET IN TO SEE DR GERARDO BUT SHE WASN'T ABLE TO DO THAT.    PT STATES SHE HAD A COLONSCOPY LAST YEAR AND EVERYTHING WAS FINE BUT SHE FEELS SOMETHING IS WRONG. SHE FEELS SHE'S COMPLETELY BLOCKED. SHE COATS HERSELF WITH VASELINE AND SOMETIMES SHE CAN GO THE NEXT MORNING. EVEN METAMUCIL AND STOOL SOFTENERS AREN'T WORKING. SHE HAS NEVER HAD THIS PROBLEM IN THE PAST, IN FACT SHE USED TO HAVE ISSUE WITH LOOSE STOOLS AND SINCE THE COLONOSCOPY SHE IS HAVING TROUBLE WITH BOWEL MOVEMENTS.    PLEASE GIVE PT A CALL BACK TO SCHEDULE. NO OPENINGS FOUND FOR PINKY KNIGHT.     IF YOU DON'T REACH THE PT ON HER HOME PHONE YOU CAN CALL HER CELL.

## 2024-03-11 ENCOUNTER — HOSPITAL ENCOUNTER (OUTPATIENT)
Dept: GENERAL RADIOLOGY | Facility: HOSPITAL | Age: 84
Discharge: HOME | End: 2024-03-11
Admitting: NURSE PRACTITIONER
Payer: MEDICARE

## 2024-03-11 ENCOUNTER — OFFICE VISIT (OUTPATIENT)
Dept: SURGERY | Facility: CLINIC | Age: 84
End: 2024-03-11
Payer: MEDICARE

## 2024-03-11 VITALS
WEIGHT: 161 LBS | BODY MASS INDEX: 27.49 KG/M2 | DIASTOLIC BLOOD PRESSURE: 85 MMHG | HEIGHT: 64 IN | SYSTOLIC BLOOD PRESSURE: 153 MMHG | HEART RATE: 96 BPM

## 2024-03-11 DIAGNOSIS — K59.00 CONSTIPATION, UNSPECIFIED CONSTIPATION TYPE: Primary | ICD-10-CM

## 2024-03-11 PROCEDURE — 74019 RADEX ABDOMEN 2 VIEWS: CPT

## 2024-03-11 PROCEDURE — 1160F RVW MEDS BY RX/DR IN RCRD: CPT | Performed by: NURSE PRACTITIONER

## 2024-03-11 PROCEDURE — 99214 OFFICE O/P EST MOD 30 MIN: CPT | Performed by: NURSE PRACTITIONER

## 2024-03-11 PROCEDURE — 1159F MED LIST DOCD IN RCRD: CPT | Performed by: NURSE PRACTITIONER

## 2024-03-11 NOTE — PROGRESS NOTES
Chief Complaint: Colonoscopy (consult)    Subjective      Colonoscopy consultation       History of Present Illness  Cinthia Díaz is a 83 y.o. female presents to [facility] GENER[facility]ERY for colonoscopy consultation.    Patient presents today with complaints of constipation.  Patient feels she is completely blocked.  Patient reports that she coats her self with Vaseline to be able to have bowel movements the next morning.  Patient does admit to taking Metamucil and stool softeners with no help with her symptoms.  Patient is only been taking Metamucil x 1 month.  Patient does have the sense of incomplete emptying.    Patient reports that she took some laxatives over the weekend.  And now she is with loose stool.  She denies any rectal bleeding or lower abdominal pain.  Admits to family history of colon cancer with her father.    1/23: Colonoscopy (Brian): Cecum - tubular adenoma; Descending - tubular adenoma.    4/18: Colonoscopy (Meaghan): Sigmoid- hyperplastic; Ascending- tubular adenoma.     7/15: Colonoscopy (Meaghan): Sigmoid - diverticulosis; Transverse- 2- tubular adenomas.    2/13: Colonoscopy (Meaghan): Transverse - tubular adenoma; Cecum - tubulovillous adenoma; Ascending- tubular adenoma.     Objective     Past Medical History:   Diagnosis Date    Arthritis     Diabetes mellitus     Disease of thyroid gland     Hyperlipidemia     Hypertension     Pneumonia     Seasonal allergies        Past Surgical History:   Procedure Laterality Date    BLADDER SURGERY      CHOLECYSTECTOMY      COLONOSCOPY      COLONOSCOPY N/A 1/25/2023    Procedure: COLONOSCOPY with polypectomy with cold snare;  Surgeon: Sánchez Torres MD;  Location: [facility] ENDOSCOPY;  Service: General;  Laterality: N/A;  colon polyps    HERNIA REPAIR      HYSTERECTOMY         Outpatient Medications Marked as Taking for the 3/11/24 encounter (Office Visit) with Cuong April, APRN   Medication Sig Dispense Refill    amLODIPine  "(NORVASC) 2.5 MG tablet Take 1 tablet by mouth Daily.      aspirin 81 MG EC tablet Take 1 tablet by mouth Daily.      atorvastatin (LIPITOR) 20 MG tablet Take 1 tablet by mouth Daily.      cyanocobalamin (VITAMIN B-12) 1000 MCG tablet Take 1 tablet by mouth Daily.      Ergocalciferol (VITAMIN D2 PO) Take  by mouth.      fluticasone (FLONASE) 50 MCG/ACT nasal spray 1 spray into the nostril(s) as directed by provider Daily As Needed for Rhinitis or Allergies. 18.2 mL 1    levothyroxine (SYNTHROID, LEVOTHROID) 75 MCG tablet Take 1 tablet by mouth Daily.      metFORMIN (GLUCOPHAGE) 500 MG tablet Take 1 tablet by mouth 2 (Two) Times a Day.      metoprolol succinate XL (TOPROL-XL) 25 MG 24 hr tablet Take 1 tablet by mouth Daily.      montelukast (SINGULAIR) 10 MG tablet Take 1 tablet by mouth Every Night. 90 tablet 3       Allergies   Allergen Reactions    Codeine Nausea And Vomiting    Morphine Nausea And Vomiting    Telmisartan Other (See Comments)     ALL ARB's, causes cough     Ace Inhibitors Cough    Molds & Smuts Other (See Comments)     Seasonal allergies         Family History   Problem Relation Age of Onset    Stroke Mother     Diabetes Mother     Cancer Father     Colon cancer Father     Other Brother     Malig Hyperthermia Neg Hx        Social History     Socioeconomic History    Marital status:    Tobacco Use    Smoking status: Never     Passive exposure: Past    Smokeless tobacco: Never   Vaping Use    Vaping status: Never Used   Substance and Sexual Activity    Alcohol use: Never    Drug use: Never    Sexual activity: Defer       Review of Systems   Constitutional:  Negative for chills and fever.   Gastrointestinal:  Positive for constipation. Negative for abdominal distention, abdominal pain, anal bleeding, blood in stool, diarrhea and rectal pain.        Vital Signs:   /85 (BP Location: Left arm)   Pulse 96   Ht 162.6 cm (64\")   Wt 73 kg (161 lb)   BMI 27.64 kg/m²      Physical " Exam  Vitals and nursing note reviewed.   Constitutional:       General: She is not in acute distress.     Appearance: Normal appearance.   HENT:      Head: Normocephalic.   Cardiovascular:      Rate and Rhythm: Normal rate.   Pulmonary:      Effort: Pulmonary effort is normal.   Abdominal:      Palpations: Abdomen is soft.      Tenderness: There is no guarding.   Musculoskeletal:         General: No deformity. Normal range of motion.   Skin:     General: Skin is warm and dry.      Coloration: Skin is not jaundiced.   Neurological:      General: No focal deficit present.      Mental Status: She is alert.   Psychiatric:         Mood and Affect: Mood normal.         Thought Content: Thought content normal.          Result Review :              []  Laboratory  []  Radiology  []  Pathology  []  Microbiology  []  EKG/Telemetry   []  Cardiology/Vascular   []  Old records  I spent 25 minutes caring for Cinthia on this date of service. This time includes time spent by me in the following activities: reviewing tests, obtaining and/or reviewing a separately obtained history, performing a medically appropriate examination and/or evaluation, ordering medications, tests, or procedures, and documenting information in the medical record     Assessment and Plan    Diagnoses and all orders for this visit:    1. Constipation, unspecified constipation type (Primary)  -     XR Abdomen Flat & Upright        Follow Up   Return for Abdominal x-ray.      I have scheduled the patient for abdominal x-rays, to evaluate constipation.  Once those results are available I will call the patient and develop a plan.  Patient verbalizes understanding.    I do want the patient to continue Metamucil nightly.  Continue to use Colace and supplement with MiraLAX as needed.        Patient was given instructions and counseling regarding her condition or for health maintenance advice. Please see specific information pulled into the AVS if appropriate.

## 2024-04-25 ENCOUNTER — TRANSCRIBE ORDERS (OUTPATIENT)
Dept: ADMINISTRATIVE | Facility: HOSPITAL | Age: 84
End: 2024-04-25
Payer: MEDICARE

## 2024-04-25 DIAGNOSIS — Z78.0 POST-MENOPAUSAL: ICD-10-CM

## 2024-04-25 DIAGNOSIS — Z12.31 SCREENING MAMMOGRAM FOR BREAST CANCER: Primary | ICD-10-CM

## 2024-06-20 NOTE — PROGRESS NOTES
Primary Care Provider  Mahi Gil APRN   Referring Provider  No ref. provider found    Patient Complaint  Follow-up (9 Months) and Bronchitis, mucopurulent recurrent      SUBJECTIVE    History of Presenting Illness  Cinthia Díaz is a pleasant 84 y.o. female who presents to [facility] for 6-month follow-up appointment.  I last saw the patient 12/14/2023.  Patient is here for continued management of recurrent bronchitis, lung nodules, persistent chronic cough.  Patient is a lifelong non-smoker.  She continues on Claritin and Singulair Flonase azelastine for daily for her symptoms.  [facility].  Patient states overall she is doing very well she just has a clear runny nose is her biggest complaint.  Patient says she has not had any recurrent bronchitis since I last saw her.  She denies dyspnea, coughing, wheezing, headaches, chest pain, weight loss or hemoptysis. Denies fevers, chills and night sweats. Cinthia Díaz is able to perform ADLs without difficulties and denies any swollen glands/lymph nodes in the head or neck.    I have personally reviewed the review of systems, past family, social, medical and surgical histories; and agree with their findings.    Review of Systems  Constitutional symptoms:  Denied complaints   Ear, nose, throat: Clear runny nose  Cardiovascular:  Denied complaints  Respiratory: Denied complaints  Gastrointestinal: Denied complaints  Musculoskeletal: Denied complaints    Family History   Problem Relation Age of Onset    Stroke Mother     Diabetes Mother     Cancer Father     Colon cancer Father     Other Brother     Malig Hyperthermia Neg Hx         Social History     Socioeconomic History    Marital status:    Tobacco Use    Smoking status: Never     Passive exposure: Past    Smokeless tobacco: Never   Vaping Use    Vaping status: Never Used    Substance and Sexual Activity    Alcohol use: Never    Drug use: Never    Sexual activity: Defer        Past Medical History:   Diagnosis Date    Arthritis     Diabetes mellitus     Disease of thyroid gland     Hyperlipidemia     Hypertension     Pneumonia     Seasonal allergies         Immunization History   Administered Date(s) Administered    ABRYSVO (RSV, 60+ or pregnant women 32-36 wks) 09/06/2023    COVID-19 (MODERNA) 1st,2nd,3rd Dose Monovalent 01/13/2021, 02/09/2021, 11/06/2021    COVID-19 (MODERNA) Monovalent Original Booster 07/06/2022    COVID-19 F23 (PFIZER) 12YRS+ (COMIRNATY) 01/12/2024    Flu Vaccine Quad PF >36MO 10/16/2015, 09/25/2018    Fluad Quad 65+ 09/27/2020, 01/12/2024    Fluzone (or Fluarix & Flulaval for VFC) >6mos 10/16/2015, 09/25/2018    Fluzone High-Dose 65+yrs 11/06/2021, 10/06/2022    Hepatitis B 06/25/2001, 07/25/2001, 11/27/2001    Pneumococcal Conjugate 20-Valent (PCV20) 08/10/2022    Tdap 03/26/2020       Allergies   Allergen Reactions    Codeine Nausea And Vomiting    Morphine Nausea And Vomiting    Telmisartan Other (See Comments)     ALL ARB's, causes cough     Ace Inhibitors Cough    Molds & Smuts Other (See Comments)     Seasonal allergies           Current Outpatient Medications:     amLODIPine (NORVASC) 2.5 MG tablet, Take 1 tablet by mouth Daily., Disp: , Rfl:     aspirin 81 MG EC tablet, Take 1 tablet by mouth Daily., Disp: , Rfl:     atorvastatin (LIPITOR) 20 MG tablet, Take 1 tablet by mouth Daily., Disp: , Rfl:     azelastine (ASTELIN) 0.1 % nasal spray, 2 sprays into the nostril(s) as directed by provider 2 (Two) Times a Day. Use in each nostril as directed, Disp: 30 mL, Rfl: 11    cyanocobalamin (VITAMIN B-12) 1000 MCG tablet, Take 1 tablet by mouth Daily., Disp: , Rfl:     Ergocalciferol (VITAMIN D2 PO), Take  by mouth., Disp: , Rfl:     fluticasone (FLONASE) 50 MCG/ACT nasal spray, 1 spray into the nostril(s) as directed by provider Daily As Needed for Rhinitis or  "Allergies., Disp: 18.2 mL, Rfl: 1    levothyroxine (SYNTHROID, LEVOTHROID) 75 MCG tablet, Take 1 tablet by mouth Daily., Disp: , Rfl:     metFORMIN (GLUCOPHAGE) 500 MG tablet, Take 1 tablet by mouth 2 (Two) Times a Day., Disp: , Rfl:     metoprolol succinate XL (TOPROL-XL) 25 MG 24 hr tablet, Take 1 tablet by mouth Daily., Disp: , Rfl:     montelukast (SINGULAIR) 10 MG tablet, Take 1 tablet by mouth Every Night., Disp: 90 tablet, Rfl: 3    benzonatate (TESSALON) 200 MG capsule, Take 1 capsule by mouth 3 (Three) Times a Day As Needed for Cough. (Patient not taking: Reported on 3/11/2024), Disp: 42 capsule, Rfl: 2    celecoxib (CeleBREX) 200 MG capsule, Take 1 capsule by mouth Daily. (Patient not taking: Reported on 12/14/2023), Disp: , Rfl:     erythromycin base (E-MYCIN) 500 MG tablet, Take 1 tablet by mouth 2 (Two) Times a Day With Meals. (Patient not taking: Reported on 3/11/2024), Disp: , Rfl:            Vital Signs   /81 (BP Location: Right arm, Patient Position: Sitting, Cuff Size: Adult)   Pulse 87   Temp 98 °F (36.7 °C) (Temporal)   Resp 18   Ht 162.6 cm (64\")   Wt 72.6 kg (160 lb)   SpO2 96% Comment: Room air  BMI 27.46 kg/m²       OBJECTIVE    Physical Exam  Vital Signs Reviewed   WDWN, Alert, NAD.    HEENT:  PERRL, EOMI.  OP, nares clear  Neck:  Supple, no JVD, no thyromegaly  Chest:  good aeration, clear to auscultation bilaterally, tympanic to percussion bilaterally, no work of breathing noted  CV: RRR, no MGR, pulses 2+, equal.  Abd:  Soft, NT, ND, + BS, no HSM  EXT:  no clubbing, no cyanosis, no edema  Neuro:  A&Ox3, CN grossly intact, no focal deficits.  Skin: No rashes or lesions noted    Results Review  I have personally reviewed the prior office notes, hospital records, labs, and diagnostics.    ASSESSMENT         Patient Active Problem List   Diagnosis    History of pneumonia    Lung nodule    Allergies    Bronchitis, mucopurulent recurrent    Hiatal hernia    Colon cancer screening "    Nocturia    OAB (overactive bladder)    Lower leg edema    Sepsis without acute organ dysfunction, due to unspecified organism    Community acquired pneumonia    Seasonal allergies    Wheezing    Cough    Dyspnea    COVID-19 virus detected       Encounter Diagnoses   Name Primary?    Seasonal allergies     Bronchitis, mucopurulent recurrent Yes      PLAN  -continue with Singulair, Flonase, azelastine as prescribed refill sent to pharmacy  -Patient to continue with [facility]  -Patient to follow-up in 1 year or sooner if needed  Diagnoses and all orders for this visit:    1. Bronchitis, mucopurulent recurrent (Primary)  -     montelukast (SINGULAIR) 10 MG tablet; Take 1 tablet by mouth Every Night.  Dispense: 90 tablet; Refill: 3  -     fluticasone (FLONASE) 50 MCG/ACT nasal spray; 1 spray into the nostril(s) as directed by provider Daily As Needed for Rhinitis or Allergies.  Dispense: 18.2 mL; Refill: 1  -     azelastine (ASTELIN) 0.1 % nasal spray; 2 sprays into the nostril(s) as directed by provider 2 (Two) Times a Day. Use in each nostril as directed  Dispense: 30 mL; Refill: 11    2. Seasonal allergies  -     montelukast (SINGULAIR) 10 MG tablet; Take 1 tablet by mouth Every Night.  Dispense: 90 tablet; Refill: 3  -     fluticasone (FLONASE) 50 MCG/ACT nasal spray; 1 spray into the nostril(s) as directed by provider Daily As Needed for Rhinitis or Allergies.  Dispense: 18.2 mL; Refill: 1  -     azelastine (ASTELIN) 0.1 % nasal spray; 2 sprays into the nostril(s) as directed by provider 2 (Two) Times a Day. Use in each nostril as directed  Dispense: 30 mL; Refill: 11           Smoking status:never  Vaccination status:reviewed  Medications personally reviewed    Follow Up  Return in about 1 year (around 6/21/2025).    Patient was given instructions and counseling regarding her condition or for health maintenance advice. Please see specific information pulled into the AVS if appropriate.       I  spent 15 minutes caring for Cinthia Díaz on this date of service. This time includes time spent by me in the following activities:preparing for the visit, reviewing tests, obtaining and/or reviewing a separately obtained history, performing a medically appropriate examination and/or evaluation, counseling and educating the patient/family/caregiver, ordering medications, tests, or procedures, documenting information in the medical record, independently interpreting results and communicating that information with the patient/family/caregiver and answered questions family members, discuss medications.

## 2024-06-21 ENCOUNTER — OFFICE VISIT (OUTPATIENT)
Dept: PULMONOLOGY | Facility: CLINIC | Age: 84
End: 2024-06-21
Payer: MEDICARE

## 2024-06-21 VITALS
RESPIRATION RATE: 18 BRPM | WEIGHT: 160 LBS | BODY MASS INDEX: 27.31 KG/M2 | TEMPERATURE: 98 F | SYSTOLIC BLOOD PRESSURE: 157 MMHG | DIASTOLIC BLOOD PRESSURE: 81 MMHG | HEIGHT: 64 IN | OXYGEN SATURATION: 96 % | HEART RATE: 87 BPM

## 2024-06-21 DIAGNOSIS — J41.1 BRONCHITIS, MUCOPURULENT RECURRENT: Primary | ICD-10-CM

## 2024-06-21 DIAGNOSIS — J30.2 SEASONAL ALLERGIES: ICD-10-CM

## 2024-06-21 PROCEDURE — 1159F MED LIST DOCD IN RCRD: CPT | Performed by: NURSE PRACTITIONER

## 2024-06-21 PROCEDURE — 99214 OFFICE O/P EST MOD 30 MIN: CPT | Performed by: NURSE PRACTITIONER

## 2024-06-21 PROCEDURE — 1160F RVW MEDS BY RX/DR IN RCRD: CPT | Performed by: NURSE PRACTITIONER

## 2024-06-21 RX ORDER — FLUTICASONE PROPIONATE 50 MCG
1 SPRAY, SUSPENSION (ML) NASAL DAILY PRN
Qty: 18.2 ML | Refills: 1 | Status: SHIPPED | OUTPATIENT
Start: 2024-06-21

## 2024-06-21 RX ORDER — MONTELUKAST SODIUM 10 MG/1
10 TABLET ORAL NIGHTLY
Qty: 90 TABLET | Refills: 3 | Status: SHIPPED | OUTPATIENT
Start: 2024-06-21

## 2024-06-21 RX ORDER — AZELASTINE 1 MG/ML
2 SPRAY, METERED NASAL 2 TIMES DAILY
Qty: 30 ML | Refills: 11 | Status: SHIPPED | OUTPATIENT
Start: 2024-06-21

## 2024-06-28 ENCOUNTER — HOSPITAL ENCOUNTER (OUTPATIENT)
Dept: BONE DENSITY | Facility: HOSPITAL | Age: 84
Discharge: HOME | End: 2024-06-28
Payer: MEDICARE

## 2024-06-28 ENCOUNTER — HOSPITAL ENCOUNTER (OUTPATIENT)
Dept: MAMMOGRAPHY | Facility: HOSPITAL | Age: 84
Discharge: HOME | End: 2024-06-28
Payer: MEDICARE

## 2024-06-28 DIAGNOSIS — Z78.0 POST-MENOPAUSAL: ICD-10-CM

## 2024-06-28 DIAGNOSIS — Z12.31 SCREENING MAMMOGRAM FOR BREAST CANCER: ICD-10-CM

## 2024-06-28 PROCEDURE — 77067 SCR MAMMO BI INCL CAD: CPT

## 2024-06-28 PROCEDURE — 77063 BREAST TOMOSYNTHESIS BI: CPT

## 2024-06-28 PROCEDURE — 77080 DXA BONE DENSITY AXIAL: CPT

## 2024-07-22 ENCOUNTER — OFFICE VISIT (OUTPATIENT)
Dept: UROLOGY | Facility: CLINIC | Age: 84
End: 2024-07-22
Payer: MEDICARE

## 2024-07-22 VITALS
WEIGHT: 162.8 LBS | HEIGHT: 64 IN | BODY MASS INDEX: 27.79 KG/M2 | HEART RATE: 79 BPM | DIASTOLIC BLOOD PRESSURE: 92 MMHG | SYSTOLIC BLOOD PRESSURE: 166 MMHG

## 2024-07-22 DIAGNOSIS — N32.81 OAB (OVERACTIVE BLADDER): Primary | ICD-10-CM

## 2024-07-22 LAB
BILIRUB BLD-MCNC: NEGATIVE MG/DL
CLARITY, POC: CLEAR
COLOR UR: YELLOW
EXPIRATION DATE: ABNORMAL
GLUCOSE UR STRIP-MCNC: NEGATIVE MG/DL
KETONES UR QL: NEGATIVE
LEUKOCYTE EST, POC: ABNORMAL
Lab: ABNORMAL
NITRITE UR-MCNC: NEGATIVE MG/ML
PH UR: 5 [PH] (ref 5–8)
PROT UR STRIP-MCNC: NEGATIVE MG/DL
RBC # UR STRIP: ABNORMAL /UL
SP GR UR: 1.02 (ref 1–1.03)
UROBILINOGEN UR QL: ABNORMAL

## 2024-07-22 NOTE — PROGRESS NOTES
"Chief Complaint  Follow-up (Overactive bladder)    Subjective          Cinthia Díaz presents to [facility] UROLOGY    History of Present Illness  PCOS here for follow-up for overactive bladder.  She states she is not taking any medications currently and does not desire to.  She still has frequency and urgency does not interested in treatment at this point.  She has failed multiple medications in the past.      Objective   Vital Signs:   /92   Pulse 79   Ht 162.6 cm (64.02\")   Wt 73.8 kg (162 lb 12.8 oz)   BMI 27.93 kg/m²       Physical Exam  Vitals and nursing note reviewed.   Constitutional:       Appearance: Normal appearance. She is well-developed.   Pulmonary:      Effort: Pulmonary effort is normal.      Breath sounds: Normal air entry.   Neurological:      Mental Status: She is alert and oriented to person, place, and time.      Motor: Motor function is intact.   Psychiatric:         Mood and Affect: Mood normal.         Behavior: Behavior normal.        Result Review :   The following data was reviewed by: Jasmine Brown MD on 07/22/2024:    Results for orders placed or performed in visit on 07/22/24   POC Urinalysis Dipstick, Automated    Specimen: Urine   Result Value Ref Range    Color Yellow Yellow, Straw, Dark Yellow, Florina    Clarity, UA Clear Clear    Specific Gravity  1.020 1.005 - 1.030    pH, Urine 5.0 5.0 - 8.0    Leukocytes Trace (A) Negative    Nitrite, UA Negative Negative    Protein, POC Negative Negative mg/dL    Glucose, UA Negative Negative mg/dL    Ketones, UA Negative Negative    Urobilinogen, UA 0.2 E.U./dL Normal, 0.2 E.U./dL    Bilirubin Negative Negative    Blood, UA Trace (A) Negative    Lot Number [id number]     Expiration Date 2,025/6             Assessment and Plan    Diagnoses and all orders for this visit:    1. OAB (overactive bladder) (Primary)  -     POC Urinalysis Dipstick, Automated    Follow back up as needed.        Follow Up       No " follow-ups on file.  Patient was given instructions and counseling regarding her condition or for health maintenance advice. Please see specific information pulled into the AVS if appropriate.

## 2025-04-08 ENCOUNTER — APPOINTMENT (OUTPATIENT)
Dept: GENERAL RADIOLOGY | Facility: HOSPITAL | Age: 85
End: 2025-04-08
Payer: MEDICARE

## 2025-04-08 PROCEDURE — 73110 X-RAY EXAM OF WRIST: CPT

## 2025-04-08 PROCEDURE — 99283 EMERGENCY DEPT VISIT LOW MDM: CPT

## 2025-04-09 ENCOUNTER — HOSPITAL ENCOUNTER (EMERGENCY)
Facility: HOSPITAL | Age: 85
Discharge: HOME | End: 2025-04-09
Attending: EMERGENCY MEDICINE | Admitting: EMERGENCY MEDICINE
Payer: MEDICARE

## 2025-04-09 VITALS
WEIGHT: 169.53 LBS | BODY MASS INDEX: 28.94 KG/M2 | SYSTOLIC BLOOD PRESSURE: 162 MMHG | RESPIRATION RATE: 14 BRPM | TEMPERATURE: 98.4 F | DIASTOLIC BLOOD PRESSURE: 85 MMHG | OXYGEN SATURATION: 95 % | HEIGHT: 64 IN | HEART RATE: 96 BPM

## 2025-04-09 DIAGNOSIS — S66.919A STRAIN OF WRIST, UNSPECIFIED LATERALITY, INITIAL ENCOUNTER: Primary | ICD-10-CM

## 2025-04-09 RX ORDER — IBUPROFEN 400 MG/1
800 TABLET, FILM COATED ORAL ONCE
Status: COMPLETED | OUTPATIENT
Start: 2025-04-09 | End: 2025-04-09

## 2025-04-09 RX ADMIN — IBUPROFEN 800 MG: 400 TABLET, FILM COATED ORAL at 02:18

## 2025-04-09 NOTE — ED PROVIDER NOTES
Time: 1:28 AM EDT  Date of encounter:  4/8/2025  Independent Historian/Clinical History and Information was obtained by:   Patient    History is limited by: N/A    Chief Complaint: Wrist strain      History of Present Illness:  Patient is a 84 y.o. year old female who presents to the emergency department for evaluation of left wrist strain that occurred today.  Patient reports that she did chair yoga this morning.  The patient states that she also was doing work and slipped.  She denies overt trauma but did try to brace herself.      Patient Care Team  Primary Care Provider: Mahi Gil APRN    Past Medical History:     Allergies   Allergen Reactions    Codeine Nausea And Vomiting    Morphine Nausea And Vomiting    Telmisartan Other (See Comments)     ALL ARB's, causes cough     Ace Inhibitors Cough    Molds & Smuts Other (See Comments)     Seasonal allergies      Past Medical History:   Diagnosis Date    Arthritis     Diabetes mellitus     Disease of thyroid gland     Hyperlipidemia     Hypertension     Pneumonia     Seasonal allergies      Past Surgical History:   Procedure Laterality Date    BLADDER SURGERY      CHOLECYSTECTOMY      COLONOSCOPY      COLONOSCOPY N/A 1/25/2023    Procedure: COLONOSCOPY with polypectomy with cold snare;  Surgeon: Sánchez Torres MD;  Location: [facility] ENDOSCOPY;  Service: General;  Laterality: N/A;  colon polyps    HERNIA REPAIR      HYSTERECTOMY       Family History   Problem Relation Age of Onset    Stroke Mother     Diabetes Mother     Cancer Father     Colon cancer Father     Other Brother     Malig Hyperthermia Neg Hx        Home Medications:  Prior to Admission medications    Medication Sig Start Date End Date Taking? Authorizing Provider   amLODIPine (NORVASC) 2.5 MG tablet Take 1 tablet by mouth Daily.    Javi Harp MD   atorvastatin (LIPITOR) 20 MG tablet Take 1 tablet by mouth Daily. 7/19/21   ProviderJavi MD   cyanocobalamin (VITAMIN B-12) 1000 MCG  tablet Take 1 tablet by mouth Daily.    ProviderJavi MD   Ergocalciferol (VITAMIN D2 PO) Take  by mouth.    Javi Harp MD   fluticasone (FLONASE) 50 MCG/ACT nasal spray 1 spray into the nostril(s) as directed by provider Daily As Needed for Rhinitis or Allergies. 6/21/24   Elle Hess APRN   levothyroxine (SYNTHROID, LEVOTHROID) 75 MCG tablet Take 1 tablet by mouth Daily.    Javi Harp MD   metFORMIN (GLUCOPHAGE) 500 MG tablet Take 1 tablet by mouth 2 (Two) Times a Day. 7/29/21   Javi Harp MD   metoprolol succinate XL (TOPROL-XL) 25 MG 24 hr tablet Take 1 tablet by mouth Daily. 6/29/21   Javi Harp MD   montelukast (SINGULAIR) 10 MG tablet Take 1 tablet by mouth Every Night. 6/21/24   Elle Hess APRN   doxazosin (CARDURA) 4 MG tablet Take 4 mg by mouth every night at bedtime. 1/4/22 9/17/22  Javi Harp MD   fexofenadine-pseudoephedrine (ALLEGRA-D)  MG per 12 hr tablet Take 1 tablet by mouth 2 (Two) Times a Day. 8/10/22 9/17/22  Arnulfo Villaseñor MD        Social History:   Social History     Tobacco Use    Smoking status: Never     Passive exposure: Past    Smokeless tobacco: Never   Vaping Use    Vaping status: Never Used   Substance Use Topics    Alcohol use: Never    Drug use: Never         Review of Systems:  Review of Systems   Constitutional:  Negative for chills and fever.   HENT:  Negative for congestion, rhinorrhea and sore throat.    Eyes:  Negative for pain and visual disturbance.   Respiratory:  Negative for apnea, cough, chest tightness and shortness of breath.    Cardiovascular:  Negative for chest pain and palpitations.   Gastrointestinal:  Negative for abdominal pain, diarrhea, nausea and vomiting.   Genitourinary:  Negative for difficulty urinating and dysuria.   Musculoskeletal:  Negative for joint swelling and myalgias.   Skin:  Negative for color change.   Neurological:  Negative for seizures and headaches.  "  Psychiatric/Behavioral: Negative.     All other systems reviewed and are negative.       Physical Exam:  /72 (BP Location: Left arm, Patient Position: Sitting)   Pulse 85   Temp 98.3 °F (36.8 °C) (Oral)   Resp 16   Ht 162.6 cm (64\")   Wt 76.9 kg (169 lb 8.5 oz)   SpO2 96%   BMI 29.10 kg/m²     Physical Exam  Vitals and nursing note reviewed.   Constitutional:       General: She is not in acute distress.     Appearance: Normal appearance. She is not toxic-appearing.   HENT:      Head: Normocephalic and atraumatic.      Jaw: There is normal jaw occlusion.   Eyes:      General: Lids are normal.      Extraocular Movements: Extraocular movements intact.      Conjunctiva/sclera: Conjunctivae normal.      Pupils: Pupils are equal, round, and reactive to light.   Cardiovascular:      Rate and Rhythm: Normal rate and regular rhythm.      Pulses: Normal pulses.      Heart sounds: Normal heart sounds.   Pulmonary:      Effort: Pulmonary effort is normal. No respiratory distress.      Breath sounds: Normal breath sounds. No wheezing or rhonchi.   Abdominal:      General: Abdomen is flat.      Palpations: Abdomen is soft.      Tenderness: There is no abdominal tenderness. There is no guarding or rebound.   Musculoskeletal:         General: Normal range of motion.      Cervical back: Normal range of motion and neck supple.      Right lower leg: No edema.      Left lower leg: No edema.      Comments: (+) Left wrist tenderness   Skin:     General: Skin is warm and dry.   Neurological:      Mental Status: She is alert and oriented to person, place, and time. Mental status is at baseline.   Psychiatric:         Mood and Affect: Mood normal.                    Medical Decision Making:      Comorbidities that affect care:    Hypertension    External Notes reviewed:    Previous Clinic Note: Patient was last seen in the clinic for headache      The following orders were placed and all results were independently analyzed by " me:  Orders Placed This Encounter   Procedures    Splint Application    XR Wrist 3+ View Left       Medications Given in the Emergency Department:  Medications   ibuprofen (ADVIL,MOTRIN) tablet 800 mg (has no administration in time range)        ED Course:         Labs:    Lab Results (last 24 hours)       ** No results found for the last 24 hours. **             Imaging:    XR Wrist 3+ View Left  Result Date: 4/8/2025  XR WRIST 3+ VW LEFT-  Date of exam: 4/8/2025 11:22 PM.  Comparison: None available.  INDICATIONS: 84-year-old female with left wrist pain after activity, not otherwise specified (nos).  FINDINGS: Three (3) views of the left wrist were obtained. No acute fracture or acute malalignment is identified. Degenerative changes are present, especially involving the left first (1st) carpometacarpal (CMC) joint. There may be generalized osteopenia. No retained radiopaque foreign body. No subcutaneous emphysema. If symptoms or clinical concerns persist, consider imaging follow-up.       No acute fracture or acute malalignment is identified.   Portions of this note were completed with a voice recognition program.  4/8/2025 11:45 PM by Colby Beltrán MD on Workstation: [employer]          Differential Diagnosis and Discussion:    Extremity Pain: Differential diagnosis includes but is not limited to soft tissue sprain, tendonitis, tendon injury, dislocation, fracture, deep vein thrombosis, arterial insufficiency, osteoarthritis, bursitis, and ligamentous damage.    PROCEDURES:    X-ray were performed in the emergency department and all X-ray impressions were independently interpreted by me.    No orders to display       Procedures    MDM     The patient's symptoms are consistent with a strain vs. sprain.     A muscle strain, also known as a pulled muscle, is an injury that occurs when a muscle is overstretched or torn, often as a result of fatigue, overuse, or improper use. This can result in pain, swelling, and a  limited range of motion.    A sprain, on the other hand, is an injury to a ligament, which is the tissue that connects bones to each other. Sprains often occur in joints like the ankle or wrist when they are twisted or impacted in a way that stretches or tears the ligaments. Symptoms of a sprain can include pain, swelling, bruising, and a decreased ability to move the joint.    The patient was counseled to use rest, ice, and elevation and follow-up with their PCP or an orthopedic surgeon.                Patient Care Considerations:    None      Consultants/Shared Management Plan:    None    Social Determinants of Health:    Patient is independent, reliable, and has access to care.       Disposition and Care Coordination:    Discharged: The patient is suitable and stable for discharge with no need for consideration of admission.    I have explained the patient´s condition, diagnoses and treatment plan based on the information available to me at this time. I have answered questions and addressed any concerns. The patient has a good  understanding of the patient´s diagnosis, condition, and treatment plan as can be expected at this point. The vital signs have been stable. The patient´s condition is stable and appropriate for discharge from the emergency department.      The patient will pursue further outpatient evaluation with the primary care physician or other designated or consulting physician as outlined in the discharge instructions. They are agreeable to this plan of care and follow-up instructions have been explained in detail. The patient has received these instructions in written format and has expressed an understanding of the discharge instructions. The patient is aware that any significant change in condition or worsening of symptoms should prompt an immediate return to this or the closest emergency department or call to 911.  I have explained discharge medications and the need for follow up with the  patient/caretakers. This was also printed in the discharge instructions. Patient was discharged with the following medications and follow up:      Medication List      No changes were made to your prescriptions during this visit.      Mahi Gil, APRN  [address] [facility] [address]  [place] [address] [phone]    In 2 days         Final diagnoses:   Strain of wrist, unspecified laterality, initial encounter        ED Disposition       ED Disposition   Discharge    Condition   Stable    Comment   --               This medical record created using voice recognition software.             Linnea San MD  04/09/25 [id number]

## 2025-06-23 NOTE — PROGRESS NOTES
Primary Care Provider  Mahi Gil APRN   Referring Provider  No ref. provider found    Patient Complaint  Follow-up and Allergies    Patient or patient representative verbalized consent for the use of Ambient Listening during the visit with  FRANCK Barrera for chart documentation. 6/24/2025  13:47 EDT      Subjective       History of Presenting Illness  Cinthia Díaz is a pleasant 85 y.o. female  of  Dr. Villaseñor who presents to [facility] with history of recurrent bronchitis, lung nodules, persistent chronic cough, here for followup appointment.  Patient was last seen 6/21/2024. Patient is a lifelong non-smoker. She continues on Claritin and Singulair Flonase azelastine for daily for her symptoms. She is going [facility] where she gets allergy shots.     History of Present Illness  The patient is an 85-year-old female who presents for an annual follow-up.    She reports stable health over the past year, with no hospital or urgent care visits for her pulmonary condition. She does not experience any cough. She has severe allergies but manages to breathe comfortably with her mouth closed throughout the night. Upon waking, she experiences a runny nose for approximately 2 hours, which does not cause any significant discomfort as long as it does not affect her lungs.    She expresses a preference for not undergoing any diagnostic tests unless absolutely necessary. She does not believe any diagnostics are necessary unless her symptoms worsen and affect her lungs.     She has a history of COVID-19 infection and was treated with Paxlovid, Z-Tarik, and prednisone. However, she discontinued the medication after 2.5 days due to severe side effects. She notes that her blood pressure was elevated upon arrival today, but it was within normal limits when she checked it at home.  She feels it was related to the long walk from the parking  lot to the office.    She is currently on Singulair and Flonase, which are being refilled by her PCP.    She sleeps on her back due to knee pain.    SOCIAL HISTORY  Occupations: Traveled a lot with job  Exercise: Walks     At present time patient denies dyspnea, coughing, wheezing, headaches, chest pain, weight loss or hemoptysis. Patient denies fevers, chills and night sweats. Cinthia Díaz is able to perform ADLs.      I have personally reviewed the review of systems, past family, social, medical and surgical histories; and agree with their findings.      Review of Systems   Constitutional: Negative.    HENT: Negative.     Respiratory: Negative.     Cardiovascular: Negative.    Musculoskeletal: Negative.    Neurological: Negative.    Psychiatric/Behavioral: Negative.           Family History   Problem Relation Age of Onset    Stroke Mother     Diabetes Mother     Cancer Father     Colon cancer Father     Other Brother     Malig Hyperthermia Neg Hx         Social History     Socioeconomic History    Marital status:    Tobacco Use    Smoking status: Never     Passive exposure: Past    Smokeless tobacco: Never   Vaping Use    Vaping status: Never Used   Substance and Sexual Activity    Alcohol use: Never    Drug use: Never    Sexual activity: Defer        Past Medical History:   Diagnosis Date    Arthritis     Diabetes mellitus     Disease of thyroid gland     Hyperlipidemia     Hypertension     Pneumonia     Seasonal allergies         Immunization History   Administered Date(s) Administered    ABRYSVO (RSV, 60+ or pregnant women 32-36 wks) 09/06/2023    COVID-19 (MODERNA) 1st,2nd,3rd Dose Monovalent 01/13/2021, 02/09/2021, 11/06/2021    COVID-19 (MODERNA) Monovalent Original Booster 07/06/2022    COVID-19 (PFIZER) 12YRS+ (COMIRNATY) 01/12/2024, 11/20/2024    COVID-19 (UNSPECIFIED) 11/20/2024    Flu Vaccine Quad PF >36MO 10/16/2015, 09/25/2018    Fluad Quad 65+ 09/27/2020, 01/12/2024    Fluzone (or  "Fluarix & Flulaval for VFC) >6mos 10/16/2015, 09/25/2018    Fluzone High-Dose 65+YRS 11/20/2024    Fluzone High-Dose 65+yrs 11/06/2021, 10/06/2022    Hepatitis B 06/25/2001, 07/25/2001, 11/27/2001    Influenza, Unspecified 11/20/2024    Pneumococcal Conjugate 20-Valent (PCV20) 08/10/2022    Shingrix 11/20/2024, 03/04/2025    Tdap 03/26/2020       Allergies   Allergen Reactions    Codeine Nausea And Vomiting    Morphine Nausea And Vomiting    Telmisartan Other (See Comments)     ALL ARB's, causes cough     Ace Inhibitors Cough    Molds & Smuts Other (See Comments)     Seasonal allergies           Current Outpatient Medications:     amLODIPine (NORVASC) 2.5 MG tablet, Take 1 tablet by mouth Daily., Disp: , Rfl:     atorvastatin (LIPITOR) 20 MG tablet, Take 1 tablet by mouth Daily., Disp: , Rfl:     cyanocobalamin (VITAMIN B-12) 1000 MCG tablet, Take 1 tablet by mouth Daily., Disp: , Rfl:     Ergocalciferol (VITAMIN D2 PO), Take  by mouth., Disp: , Rfl:     fluticasone (FLONASE) 50 MCG/ACT nasal spray, 1 spray into the nostril(s) as directed by provider Daily As Needed for Rhinitis or Allergies., Disp: 18.2 mL, Rfl: 1    levothyroxine (SYNTHROID, LEVOTHROID) 75 MCG tablet, Take 1 tablet by mouth Daily., Disp: , Rfl:     metFORMIN (GLUCOPHAGE) 500 MG tablet, Take 1 tablet by mouth 2 (Two) Times a Day., Disp: , Rfl:     metoprolol succinate XL (TOPROL-XL) 25 MG 24 hr tablet, Take 1 tablet by mouth Daily., Disp: , Rfl:     montelukast (SINGULAIR) 10 MG tablet, Take 1 tablet by mouth Every Night., Disp: 90 tablet, Rfl: 3         Vital Signs   /85 (BP Location: Left arm, Patient Position: Sitting, Cuff Size: Adult)   Pulse 120   Temp 98.3 °F (36.8 °C)   Resp 16   Ht 162.6 cm (64\")   Wt 76.7 kg (169 lb)   SpO2 95%   BMI 29.01 kg/m²       Objective     Physical Exam  Vitals reviewed.   Constitutional:       General: She is not in acute distress.     Appearance: Normal appearance. She is not ill-appearing.   HENT: "      Head: Normocephalic and atraumatic.      Nose: Nose normal.      Mouth/Throat:      Mouth: Mucous membranes are moist.      Pharynx: Oropharynx is clear.   Eyes:      Extraocular Movements: Extraocular movements intact.      Conjunctiva/sclera: Conjunctivae normal.      Pupils: Pupils are equal, round, and reactive to light.   Cardiovascular:      Rate and Rhythm: Normal rate and regular rhythm.      Pulses: Normal pulses.      Heart sounds: Normal heart sounds.   Pulmonary:      Effort: Pulmonary effort is normal. No respiratory distress.      Breath sounds: Normal breath sounds. No stridor. No wheezing, rhonchi or rales.   Abdominal:      General: Bowel sounds are normal.   Musculoskeletal:         General: Normal range of motion.      Cervical back: Normal range of motion and neck supple.   Skin:     General: Skin is warm and dry.   Neurological:      Mental Status: She is alert and oriented to person, place, and time.   Psychiatric:         Behavior: Behavior normal.         Physical Exam  Heart Rate: Slightly fast  Heart: Regular rhythm, slightly fast  Lungs: Clear to auscultation bilaterally         Results Review  I have personally reviewed the prior office notes, hospital records, labs, and diagnostics.            Assessment         Patient Active Problem List   Diagnosis    History of pneumonia    Lung nodule    Allergies    Bronchitis, mucopurulent recurrent    Hiatal hernia    Colon cancer screening    Nocturia    OAB (overactive bladder)    Lower leg edema    Sepsis without acute organ dysfunction, due to unspecified organism    Community acquired pneumonia    Seasonal allergies    Wheezing    Cough    Dyspnea    COVID-19 virus detected        Plan     Diagnoses and all orders for this visit:    1. Bronchitis, mucopurulent recurrent (Primary)    2. Seasonal allergies         Assessment & Plan  1. Annual follow-up.  Reports no recent hospital or urgent care visits for her lungs and no current cough.  Experiences about 2 hours of runny nose in the morning due to allergies but manages well with Singulair and Flonase. Lungs are clear upon examination, and heart rate is slightly elevated, likely due to physical activity prior to the visit. Advised to contact the office if any new symptoms arise. A chest x-ray can be ordered if needed, and it can be done at her convenience. Encouraged to reach out if any medication refills or additional support are required.    Follow-up  Will follow up in 1 year or sooner if needed.      Smoking status:  reports that she has never smoked. She has been exposed to tobacco smoke. She has never used smokeless tobacco.    Vaccination status: Reviewed  Immunization History   Administered Date(s) Administered    ABRYSVO (RSV, 60+ or pregnant women 32-36 wks) 09/06/2023    COVID-19 (MODERNA) 1st,2nd,3rd Dose Monovalent 01/13/2021, 02/09/2021, 11/06/2021    COVID-19 (MODERNA) Monovalent Original Booster 07/06/2022    COVID-19 (PFIZER) 12YRS+ (COMIRNATY) 01/12/2024, 11/20/2024    COVID-19 (UNSPECIFIED) 11/20/2024    Flu Vaccine Quad PF >36MO 10/16/2015, 09/25/2018    Fluad Quad 65+ 09/27/2020, 01/12/2024    Fluzone (or Fluarix & Flulaval for VFC) >6mos 10/16/2015, 09/25/2018    Fluzone High-Dose 65+YRS 11/20/2024    Fluzone High-Dose 65+yrs 11/06/2021, 10/06/2022    Hepatitis B 06/25/2001, 07/25/2001, 11/27/2001    Influenza, Unspecified 11/20/2024    Pneumococcal Conjugate 20-Valent (PCV20) 08/10/2022    Shingrix 11/20/2024, 03/04/2025    Tdap 03/26/2020        Medications personally reviewed    Follow Up  Return in about 1 year (around 6/24/2026).    Patient was given instructions and counseling regarding her condition or for health maintenance advice. Please see specific information pulled into the AVS if appropriate.     I spent 15 minutes caring for Cinthia Díaz on this date of service. This time includes time spent by me in the following activities:preparing for the visit,  reviewing tests, obtaining and/or reviewing a separately obtained history, performing a medically appropriate examination and/or evaluation, counseling and educating the patient/family/caregiver, ordering medications, tests, or procedures, documenting information in the medical record, independently interpreting results and communicating that information with the patient/family/caregiver and answered questions family members, discuss medications.

## 2025-06-24 ENCOUNTER — OFFICE VISIT (OUTPATIENT)
Dept: PULMONOLOGY | Facility: CLINIC | Age: 85
End: 2025-06-24
Payer: MEDICARE

## 2025-06-24 VITALS
HEART RATE: 120 BPM | TEMPERATURE: 98.3 F | BODY MASS INDEX: 28.85 KG/M2 | SYSTOLIC BLOOD PRESSURE: 148 MMHG | OXYGEN SATURATION: 95 % | RESPIRATION RATE: 16 BRPM | HEIGHT: 64 IN | WEIGHT: 169 LBS | DIASTOLIC BLOOD PRESSURE: 85 MMHG

## 2025-06-24 DIAGNOSIS — J41.1 BRONCHITIS, MUCOPURULENT RECURRENT: Primary | ICD-10-CM

## 2025-06-24 DIAGNOSIS — J30.2 SEASONAL ALLERGIES: ICD-10-CM

## 2025-06-24 PROCEDURE — 99214 OFFICE O/P EST MOD 30 MIN: CPT | Performed by: NURSE PRACTITIONER

## 2025-06-24 PROCEDURE — 1159F MED LIST DOCD IN RCRD: CPT | Performed by: NURSE PRACTITIONER

## 2025-06-24 PROCEDURE — 1160F RVW MEDS BY RX/DR IN RCRD: CPT | Performed by: NURSE PRACTITIONER

## 2025-06-24 RX ORDER — MONTELUKAST SODIUM 10 MG/1
10 TABLET ORAL NIGHTLY
Qty: 90 TABLET | Refills: 3 | Status: CANCELLED | OUTPATIENT
Start: 2025-06-24

## 2025-06-24 RX ORDER — FLUTICASONE PROPIONATE 50 MCG
1 SPRAY, SUSPENSION (ML) NASAL DAILY PRN
Qty: 16 G | Refills: 11 | Status: CANCELLED | OUTPATIENT
Start: 2025-06-24

## (undated) DEVICE — SOL IRRG H2O PL/BG 1000ML STRL

## (undated) DEVICE — THE SINGLE USE ETRAP – POLYP TRAP IS USED FOR SUCTION RETRIEVAL OF ENDOSCOPICALLY REMOVED POLYPS.: Brand: ETRAP

## (undated) DEVICE — SOL IRR NACL 0.9PCT BT 1000ML

## (undated) DEVICE — GLV SURG BIOGEL LTX PF 7 1/2

## (undated) DEVICE — SNAR E/S POLYP SNAREMASTER OVL/10MM 2.8X2300MM YEL

## (undated) DEVICE — SOLIDIFIER LIQLOC PLS 1500CC BT

## (undated) DEVICE — Device: Brand: DEFENDO AIR/WATER/SUCTION AND BIOPSY VALVE

## (undated) DEVICE — LINER SURG CANSTR SXN S/RIGD 1500CC

## (undated) DEVICE — Device

## (undated) DEVICE — CONN JET HYDRA H20 AUXILIARY DISP